# Patient Record
Sex: FEMALE | Race: WHITE | NOT HISPANIC OR LATINO | Employment: OTHER | URBAN - NONMETROPOLITAN AREA
[De-identification: names, ages, dates, MRNs, and addresses within clinical notes are randomized per-mention and may not be internally consistent; named-entity substitution may affect disease eponyms.]

---

## 2017-02-23 ENCOUNTER — OFFICE VISIT (OUTPATIENT)
Dept: CARDIOLOGY | Facility: CLINIC | Age: 62
End: 2017-02-23

## 2017-02-23 VITALS
SYSTOLIC BLOOD PRESSURE: 142 MMHG | HEIGHT: 64 IN | OXYGEN SATURATION: 99 % | DIASTOLIC BLOOD PRESSURE: 80 MMHG | HEART RATE: 74 BPM | WEIGHT: 201 LBS | BODY MASS INDEX: 34.31 KG/M2 | RESPIRATION RATE: 16 BRPM

## 2017-02-23 DIAGNOSIS — I10 ESSENTIAL HYPERTENSION: Primary | ICD-10-CM

## 2017-02-23 DIAGNOSIS — E78.2 MIXED HYPERLIPIDEMIA: ICD-10-CM

## 2017-02-23 PROCEDURE — 99213 OFFICE O/P EST LOW 20 MIN: CPT | Performed by: INTERNAL MEDICINE

## 2017-02-23 RX ORDER — VALSARTAN 160 MG/1
TABLET ORAL DAILY
Refills: 1 | COMMUNITY
Start: 2016-12-31 | End: 2018-03-19 | Stop reason: ALTCHOICE

## 2017-02-23 RX ORDER — VENLAFAXINE HYDROCHLORIDE 150 MG/1
150 CAPSULE, EXTENDED RELEASE ORAL DAILY
Refills: 0 | COMMUNITY
Start: 2017-01-27 | End: 2020-02-19

## 2017-02-23 RX ORDER — SIMVASTATIN 40 MG
TABLET ORAL DAILY
Refills: 1 | Status: ON HOLD | COMMUNITY
Start: 2017-01-27 | End: 2019-09-26 | Stop reason: ALTCHOICE

## 2017-02-23 RX ORDER — VALSARTAN AND HYDROCHLOROTHIAZIDE 80; 12.5 MG/1; MG/1
1 TABLET, FILM COATED ORAL DAILY
Qty: 90 TABLET | Refills: 3 | Status: SHIPPED | OUTPATIENT
Start: 2017-02-23 | End: 2018-03-19 | Stop reason: SDUPTHER

## 2017-02-23 RX ORDER — FLUOXETINE HYDROCHLORIDE 40 MG/1
CAPSULE ORAL EVERY MORNING
Refills: 0 | COMMUNITY
Start: 2017-01-27 | End: 2020-02-19

## 2017-02-23 NOTE — PROGRESS NOTES
"[facility] at [facility]  Office Progress Note  Germaine Jett  1955  [phone]      Visit Date: 02/23/2017    PCP: Antonio Mike MD  [address]  [facility] [address]    IDENTIFICATION: A 61 y.o. female from [facility].      Chief Complaint   Patient presents with   • Follow-up     1 YEAR   • Hypertension   • Hyperlipidemia   • Palpitations       PROBLEM LIST:   CAD?  No recent ischemic evaluation  HL  simva 3/13 with -125  04/15 248/184/59/152, on statin \"few days a week\"  HTN  Insomnia  hypothyroid  10/14 TSH 5.320  04/15 TSH 4.580  (R) knee osteoarthritis  meniscal scope, Dr. Alvino Cifuentes, 01/15  removal of ganglion cyst 01/15     Allergies  Allergies   Allergen Reactions   • Benadryl [Diphenhydramine]      SLEEPY       Current Medications    Current Outpatient Prescriptions:   •  FLUoxetine (PROzac) 40 MG capsule, Every Morning., Disp: , Rfl: 0  •  simvastatin (ZOCOR) 40 MG tablet, Daily., Disp: , Rfl: 1  •  valsartan (DIOVAN) 160 MG tablet, Daily., Disp: , Rfl: 1  •  venlafaxine XR (EFFEXOR-XR) 150 MG 24 hr capsule, Daily., Disp: , Rfl: 0      History of Present Illness     Pt returns after a two-year hiatus from practice noticing labile blood pressures headaches and then fatigue on occasion.  She has noted about 2 times per month  she will have headache and then overwhelming fatigue since she feels like a sloth at these times.  There are no correlating symptoms that she can state that  happen with this and nothing to insite such symptoms.  Her baseline heart rate with systolics of 130s to 150s.  She has started a low carbohydrate diet in the last 2 weeks and is pleased that she has lost 6 pounds and feels better.  She had lab work in December of which she was told was okay however I'm data deficient regarding such     ROS:  All systems have been reviewed and are negative with the exception of those mentioned in the HPI.    OBJECTIVE:  Vitals:    02/23/17 1116 02/23/17 1117 " "  BP: 156/80 142/80   BP Location: Right arm Right arm   Patient Position: Sitting Standing   Cuff Size: Large Adult Large Adult   Pulse: 74    Resp: 16    SpO2: 99%    Weight: 201 lb (91.2 kg)    Height: 64\" (162.6 cm)      Physical Exam   Constitutional: She appears well-developed and well-nourished.   Neck: Normal range of motion. Neck supple. No hepatojugular reflux and no JVD present. Carotid bruit is not present. No tracheal deviation present. No thyromegaly present.   Cardiovascular: Normal rate, regular rhythm, S1 normal, S2 normal, intact distal pulses and normal pulses.  PMI is not displaced.  Exam reveals no gallop, no distant heart sounds, no friction rub, no midsystolic click and no opening snap.    No murmur heard.  Pulses:       Radial pulses are 2+ on the right side, and 2+ on the left side.        Dorsalis pedis pulses are 2+ on the right side, and 2+ on the left side.        Posterior tibial pulses are 2+ on the right side, and 2+ on the left side.   Pulmonary/Chest: Effort normal and breath sounds normal. She has no wheezes. She has no rales.   Abdominal: Soft. Bowel sounds are normal. She exhibits no mass. There is no tenderness. There is no guarding.       Diagnostic Data:  Procedures      ASSESSMENT:   Diagnosis Plan   1. Essential hypertension     2. Mixed hyperlipidemia         PLAN:  Uncontrolled hypertension will add low-dose diuretic to her current Diovan formulation    Dyslipidemia on statin therapy    Headache with questionable atypical migraine    Antonio Mike MD, thank you for referring Ms. Jett for evaluation.  I have forwarded my electronically generated recommendations to you for review.  Please do not hesitate to call with any questions.      Gurvinder Shipman MD, FACC  "

## 2017-11-10 ENCOUNTER — TRANSCRIBE ORDERS (OUTPATIENT)
Dept: ADMINISTRATIVE | Facility: HOSPITAL | Age: 62
End: 2017-11-10

## 2017-11-10 DIAGNOSIS — Z12.39 SCREENING FOR BREAST CANCER: Primary | ICD-10-CM

## 2018-01-15 ENCOUNTER — OFFICE VISIT (OUTPATIENT)
Dept: ORTHOPEDIC SURGERY | Facility: CLINIC | Age: 63
End: 2018-01-15

## 2018-01-15 VITALS
BODY MASS INDEX: 34.64 KG/M2 | WEIGHT: 207.89 LBS | SYSTOLIC BLOOD PRESSURE: 165 MMHG | DIASTOLIC BLOOD PRESSURE: 82 MMHG | HEART RATE: 91 BPM | HEIGHT: 65 IN

## 2018-01-15 DIAGNOSIS — M17.11 PRIMARY OSTEOARTHRITIS OF RIGHT KNEE: Primary | ICD-10-CM

## 2018-01-15 PROCEDURE — 99214 OFFICE O/P EST MOD 30 MIN: CPT | Performed by: ORTHOPAEDIC SURGERY

## 2018-01-15 PROCEDURE — 20610 DRAIN/INJ JOINT/BURSA W/O US: CPT | Performed by: ORTHOPAEDIC SURGERY

## 2018-01-15 RX ORDER — LIDOCAINE HYDROCHLORIDE 10 MG/ML
4 INJECTION, SOLUTION INFILTRATION; PERINEURAL
Status: COMPLETED | OUTPATIENT
Start: 2018-01-15 | End: 2018-01-15

## 2018-01-15 RX ORDER — TRIAMCINOLONE ACETONIDE 40 MG/ML
40 INJECTION, SUSPENSION INTRA-ARTICULAR; INTRAMUSCULAR
Status: COMPLETED | OUTPATIENT
Start: 2018-01-15 | End: 2018-01-15

## 2018-01-15 RX ORDER — ROPINIROLE 2 MG/1
TABLET, FILM COATED ORAL
Refills: 0 | Status: ON HOLD | COMMUNITY
Start: 2018-01-02 | End: 2019-09-26

## 2018-01-15 RX ORDER — DEXTROMETHORPHAN HYDROBROMIDE, DOXYLAMINE SUCCINATE 15; 135 MG/15ML; MG/5ML
LIQUID ORAL
Refills: 0 | Status: ON HOLD | COMMUNITY
Start: 2018-01-02 | End: 2019-09-26

## 2018-01-15 RX ADMIN — LIDOCAINE HYDROCHLORIDE 4 ML: 10 INJECTION, SOLUTION INFILTRATION; PERINEURAL at 11:29

## 2018-01-15 RX ADMIN — TRIAMCINOLONE ACETONIDE 40 MG: 40 INJECTION, SUSPENSION INTRA-ARTICULAR; INTRAMUSCULAR at 11:29

## 2018-01-15 NOTE — PROGRESS NOTES
Procedure   Large Joint Arthrocentesis  Date/Time: 1/15/2018 11:29 AM  Consent given by: patient  Site marked: site marked  Timeout: Immediately prior to procedure a time out was called to verify the correct patient, procedure, equipment, support staff and site/side marked as required   Supporting Documentation  Indications: pain   Procedure Details  Location: knee - R knee  Preparation: Patient was prepped and draped in the usual sterile fashion  Needle size: 22 G  Approach: anterolateral  Medications administered: 4 mL lidocaine 1 %; 40 mg triamcinolone acetonide 40 MG/ML  Patient tolerance: patient tolerated the procedure well with no immediate complications

## 2018-01-15 NOTE — PROGRESS NOTES
[facility] Note    Subjective     Chief Complaint   Patient presents with   • Right Knee - Follow-up        HPI    Germaine Jett is a 62 y.o. female.   She presents today for evaluation of right knee pain.  She has a known history of arthritis, and the pain is moderate to severe, aching and stabbing, associated with swelling and stiffness.  It worsens with climbing stairs.  Previous treatment has included steroid injections, as well as Supartz injections, which have provided good relief in the past.      Patient Active Problem List   Diagnosis   • Palpitations   • Hypertension   • Hyperlipidemia   • Obesity (BMI 30.0-34.9)   • Hypercholesterolemia   • Depression   • Knee osteoarthritis   • Hypothyroid   • Insomnia     Past Medical History:   Diagnosis Date   • Acute pain of left knee    • Depression     Hx of   • Dyspnea    • Edema    • Heart disease    • Hypercholesterolemia    • Hyperlipidemia    • Hypertension    • Hypothyroid      10/14 TSH 5.320;  04/15 TSH 4.580   • Insomnia    • Knee osteoarthritis     RT. meniscal scope, Dr. Alvino Cifuentes, 01/15. removal of ganglion cyst 01/15   • Obesity (BMI 30.0-34.9)     BMI 33.66   • Palpitations       Past Surgical History:   Procedure Laterality Date   • APPENDECTOMY     • BLADDER SURGERY     • GANGLION CYST EXCISION  2015   • HYSTERECTOMY     • INCISIONAL BREAST BIOPSY     • KNEE ARTHROSCOPY Right    • TONSILLECTOMY        Family History   Problem Relation Age of Onset   • Heart attack Mother      Acute MI   • Hypertension Mother    • Stroke Mother    • Depression Mother    • Heart disease Mother    • Osteoarthritis Mother    • Heart attack Father 63        • Heart disease Father    • Hypertension Father    • Depression Other    • Heart disease Other    • Hypertension Other      Social History     Social History   • Marital status:      Spouse name: N/A   • Number of children: N/A   • Years of education: N/A     Occupational  History   • Not on file.     Social History Main Topics   • Smoking status: Never Smoker   • Smokeless tobacco: Not on file   • Alcohol use No   • Drug use: No   • Sexual activity: Not on file     Other Topics Concern   • Not on file     Social History Narrative      Current Outpatient Prescriptions on File Prior to Visit   Medication Sig Dispense Refill   • Acetaminophen (TYLENOL ARTHRITIS PAIN PO) Take  by mouth Take As Directed.     • Estradiol Acetate (FEMRING) 0.05 MG/24HR ring Insert  into the vagina Take As Directed.     • FLUoxetine (PROzac) 40 MG capsule Every Morning.  0   • levothyroxine (SYNTHROID, LEVOTHROID) 25 MCG tablet Take  by mouth Daily.     • simvastatin (ZOCOR) 40 MG tablet Daily.  1   • temazepam (RESTORIL) 15 MG capsule Take  by mouth Take As Directed.     • valsartan (DIOVAN) 160 MG tablet Daily.  1   • valsartan-hydrochlorothiazide (DIOVAN HCT) 80-12.5 MG per tablet Take 1 tablet by mouth Daily. 90 tablet 3   • venlafaxine XR (EFFEXOR-XR) 150 MG 24 hr capsule Daily.  0     No current facility-administered medications on file prior to visit.       Allergies   Allergen Reactions   • Benadryl [Diphenhydramine]      SLEEPY        Review of Systems   Constitutional: Positive for fatigue. Negative for activity change, appetite change, chills, diaphoresis, fever and unexpected weight change.   HENT: Negative for congestion, dental problem, drooling, ear discharge, ear pain, facial swelling, hearing loss, mouth sores, nosebleeds, postnasal drip, rhinorrhea, sinus pressure, sneezing, sore throat, tinnitus, trouble swallowing and voice change.    Eyes: Negative for photophobia, pain, discharge, redness, itching and visual disturbance.   Respiratory: Negative for apnea, cough, choking, chest tightness, shortness of breath, wheezing and stridor.    Cardiovascular: Negative for chest pain, palpitations and leg swelling.   Gastrointestinal: Negative for abdominal distention, abdominal pain, anal  "bleeding, blood in stool, constipation, diarrhea, nausea, rectal pain and vomiting.   Endocrine: Negative for cold intolerance, heat intolerance, polydipsia, polyphagia and polyuria.   Genitourinary: Negative for decreased urine volume, difficulty urinating, dysuria, enuresis, flank pain, frequency, genital sores, hematuria and urgency.   Musculoskeletal: Positive for joint swelling. Negative for arthralgias, back pain, gait problem, myalgias, neck pain and neck stiffness.        Joint Pain    Skin: Negative for color change, pallor, rash and wound.   Allergic/Immunologic: Negative for environmental allergies, food allergies and immunocompromised state.   Neurological: Negative for dizziness, tremors, seizures, syncope, facial asymmetry, speech difficulty, weakness, light-headedness, numbness and headaches.   Hematological: Negative for adenopathy. Does not bruise/bleed easily.   Psychiatric/Behavioral: Negative for agitation, behavioral problems, confusion, decreased concentration, dysphoric mood, hallucinations, self-injury, sleep disturbance and suicidal ideas. The patient is not nervous/anxious and is not hyperactive.         Objective      Physical Exam  /82  Pulse 91  Ht 165 cm (64.96\")  Wt 94.3 kg (207 lb 14.3 oz)  BMI 34.64 kg/m2    Body mass index is 34.64 kg/(m^2).    General:   Mental Status:  Alert   Appearance: Cooperative, in no acute distress   Build and Nutrition: Well-nourished and well developed female   Orientation: Alert and oriented to person, place and time   Posture: Normal   Gait: Normal    Integument:   Right knee: No skin lesions, no rash, no ecchymosis    Lower Extremities:   Right Knee:    Tenderness:  Medial joint line tenderness    Effusion:  None    Swelling: None    Crepitus:  Positive    Range of motion:  Extension: 0°       Flexion: 120°  Instability:  No varus laxity, no valgus laxity, negative anterior drawer  Deformities:  Mild varus      Imaging/Studies  Imaging " Results (last 24 hours)     Procedure Component Value Units Date/Time    XR Knee 4+ View Right [[id number]] Resulted:  01/15/18 1125     Updated:  01/15/18 1125    Narrative:       RIght Knee Radiographs  Indication: right knee pain  Views: Standing AP's and skiers of both knees, with lateral and sunrise   views of the right knee    Comparison: no prior studies available    Findings:   Arthritic changes are seen, most affecting the medial and patellofemoral   joint, with near bone-on-bone contact in medial compartment.            Assessment and Plan     Germaine was seen today for follow-up.    Diagnoses and all orders for this visit:    Primary osteoarthritis of right knee  -     XR Knee 4+ View Right  -     Large Joint Arthrocentesis  -     Diclofenac Sodium (PENNSAID) 2 % solution; Apply to affected areas twice a day as directed          I reviewed my findings with the patient today.  Her right knee is bothering her, and she would like to have an injection today.  Ultimately, she is a candidate for knee replacement surgery.  She would also like to try an anti-inflammatory gel, and a prescription was provided today.  I will see her back in 4 months, sooner for any problems.    Return in about 4 months (around 5/15/2018).      Medical Decision Making  Management Options : prescription/IM medicine  Data/Risk: radiology tests and independent visualization of imaging, lab tests, or EMG/NCV      Medardo Cosme MD  01/15/18  9:46 PM

## 2018-01-22 ENCOUNTER — APPOINTMENT (OUTPATIENT)
Dept: MAMMOGRAPHY | Facility: HOSPITAL | Age: 63
End: 2018-01-22

## 2018-03-05 ENCOUNTER — APPOINTMENT (OUTPATIENT)
Dept: MAMMOGRAPHY | Facility: HOSPITAL | Age: 63
End: 2018-03-05

## 2018-03-19 RX ORDER — VALSARTAN AND HYDROCHLOROTHIAZIDE 80; 12.5 MG/1; MG/1
1 TABLET, FILM COATED ORAL DAILY
Qty: 60 TABLET | Refills: 0 | Status: SHIPPED | OUTPATIENT
Start: 2018-03-19 | End: 2019-03-25 | Stop reason: ALTCHOICE

## 2018-04-23 ENCOUNTER — APPOINTMENT (OUTPATIENT)
Dept: MAMMOGRAPHY | Facility: HOSPITAL | Age: 63
End: 2018-04-23

## 2018-07-09 ENCOUNTER — APPOINTMENT (OUTPATIENT)
Dept: MAMMOGRAPHY | Facility: HOSPITAL | Age: 63
End: 2018-07-09

## 2018-07-16 ENCOUNTER — OFFICE VISIT (OUTPATIENT)
Dept: ORTHOPEDIC SURGERY | Facility: CLINIC | Age: 63
End: 2018-07-16

## 2018-07-16 VITALS — OXYGEN SATURATION: 94 % | BODY MASS INDEX: 33.94 KG/M2 | HEART RATE: 89 BPM | HEIGHT: 65 IN | WEIGHT: 203.71 LBS

## 2018-07-16 DIAGNOSIS — M17.11 PRIMARY OSTEOARTHRITIS OF RIGHT KNEE: Primary | ICD-10-CM

## 2018-07-16 PROCEDURE — 20610 DRAIN/INJ JOINT/BURSA W/O US: CPT | Performed by: ORTHOPAEDIC SURGERY

## 2018-07-16 RX ORDER — ROPIVACAINE HYDROCHLORIDE 5 MG/ML
4 INJECTION, SOLUTION EPIDURAL; INFILTRATION; PERINEURAL
Status: COMPLETED | OUTPATIENT
Start: 2018-07-16 | End: 2018-07-16

## 2018-07-16 RX ORDER — TRIAMCINOLONE ACETONIDE 40 MG/ML
40 INJECTION, SUSPENSION INTRA-ARTICULAR; INTRAMUSCULAR
Status: COMPLETED | OUTPATIENT
Start: 2018-07-16 | End: 2018-07-16

## 2018-07-16 RX ADMIN — ROPIVACAINE HYDROCHLORIDE 4 ML: 5 INJECTION, SOLUTION EPIDURAL; INFILTRATION; PERINEURAL at 10:35

## 2018-07-16 RX ADMIN — TRIAMCINOLONE ACETONIDE 40 MG: 40 INJECTION, SUSPENSION INTRA-ARTICULAR; INTRAMUSCULAR at 10:35

## 2018-07-16 NOTE — PROGRESS NOTES
[facility] Orthopaedic Surgery Clinic Note    Subjective     Chief Complaint   Patient presents with   • Right Knee - Follow-up        HPI    Germaine Jett is a 63 y.o. female.  She follows up today for her right knee.  She continues to have moderate to severe pain in the knee, aching, burning, sharp pains, associated with swelling and stiffness.  It worsens with climbing stairs.  She would like to have another injection today.  She is not yet interested in surgical intervention.      Patient Active Problem List   Diagnosis   • Palpitations   • Hypertension   • Hyperlipidemia   • Obesity (BMI 30.0-34.9)   • Hypercholesterolemia   • Depression   • Knee osteoarthritis   • Hypothyroid   • Insomnia     Past Medical History:   Diagnosis Date   • Acute pain of left knee    • Depression     Hx of   • Dyspnea    • Edema    • Heart disease    • Hypercholesterolemia    • Hyperlipidemia    • Hypertension    • Hypothyroid      10/14 TSH 5.320;  04/15 TSH 4.580   • Insomnia    • Knee osteoarthritis     RT. meniscal scope, Dr. Alvino Cifuentes, 01/15. removal of ganglion cyst 01/15   • Obesity (BMI 30.0-34.9)     BMI 33.66   • Palpitations       Past Surgical History:   Procedure Laterality Date   • APPENDECTOMY     • BLADDER SURGERY     • GANGLION CYST EXCISION  2015   • HYSTERECTOMY     • INCISIONAL BREAST BIOPSY     • KNEE ARTHROSCOPY Right    • TONSILLECTOMY        Family History   Problem Relation Age of Onset   • Heart attack Mother         Acute MI   • Hypertension Mother    • Stroke Mother    • Depression Mother    • Heart disease Mother    • Osteoarthritis Mother    • Heart attack Father 63           • Heart disease Father    • Hypertension Father    • Depression Other    • Heart disease Other    • Hypertension Other      Social History     Social History   • Marital status:      Spouse name: N/A   • Number of children: N/A   • Years of education: N/A     Occupational History   • Not on file.     Social History  "Main Topics   • Smoking status: Never Smoker   • Smokeless tobacco: Not on file   • Alcohol use No   • Drug use: No   • Sexual activity: Not on file     Other Topics Concern   • Not on file     Social History Narrative   • No narrative on file      Current Outpatient Prescriptions on File Prior to Visit   Medication Sig Dispense Refill   • Acetaminophen (TYLENOL ARTHRITIS PAIN PO) Take  by mouth Take As Directed.     • Diclofenac Sodium (PENNSAID) 2 % solution Apply to affected areas twice a day as directed 112 g 1   • Estradiol Acetate (FEMRING) 0.05 MG/24HR ring Insert  into the vagina Take As Directed.     • FLUoxetine (PROzac) 40 MG capsule Every Morning.  0   • simvastatin (ZOCOR) 40 MG tablet Daily.  1   • venlafaxine XR (EFFEXOR-XR) 150 MG 24 hr capsule Daily.  0   • levothyroxine (SYNTHROID, LEVOTHROID) 25 MCG tablet Take  by mouth Daily.     • RA SUPHEDRINE 30 MG tablet TAKE 1-2 TABLETS BY MOUTH 3 TIMES DAILY AS NEEDED  0   • rOPINIRole (REQUIP) 2 MG tablet TAKE 1 TABLET BY MOUTH AT BEDTIME FOR LEGS  0   • temazepam (RESTORIL) 15 MG capsule Take  by mouth Take As Directed.     • valsartan-hydrochlorothiazide (DIOVAN HCT) 80-12.5 MG per tablet Take 1 tablet by mouth Daily. Needs an appointment before any more refills 60 tablet 0     No current facility-administered medications on file prior to visit.       Allergies   Allergen Reactions   • Benadryl [Diphenhydramine]      SLEEPY        Review of Systems   Constitutional: Positive for fatigue.   HENT: Negative.    Eyes: Negative.    Respiratory: Negative.    Cardiovascular: Negative.    Gastrointestinal: Negative.    Endocrine: Negative.    Genitourinary: Negative.    Musculoskeletal: Negative.    Skin: Negative.    Allergic/Immunologic: Negative.    Neurological: Negative.    Hematological: Negative.    Psychiatric/Behavioral: Positive for sleep disturbance.        Objective      Physical Exam  Pulse 89   Ht 165 cm (64.96\")   Wt 92.4 kg (203 lb 11.3 oz)   " SpO2 94%   BMI 33.94 kg/m²     Body mass index is 33.94 kg/m².    General:   Mental Status:  Alert   Appearance: Cooperative, in no acute distress   Build and Nutrition: Overweight female   Orientation: Alert and oriented to person, place and time   Posture: Normal   Gait: Normal    Integument:   Right knee: No skin lesions, no rash, no ecchymosis    Lower Extremities:   Right Knee:    Tenderness:  Medial joint line tenderness    Effusion:  None    Swelling: None    Crepitus:  Positive    Range of motion:  Extension: 0°       Flexion: 120°  Instability:  No varus laxity, no valgus laxity, negative anterior drawer  Deformities:  None          Assessment and Plan     Germaine was seen today for follow-up.    Diagnoses and all orders for this visit:    Primary osteoarthritis of right knee  -     Large Joint Arthrocentesis        I reviewed my findings with patient today.  She would like to have an injection for her knee today, and is not yet interested in surgical intervention.  I will see her back in 4 months, but sooner for any problems.  Injection was provided today.    Of note, she had 90% relief just a few minutes following the injection.    Return in about 4 months (around 11/16/2018).      Medical Decision Making  Management Options : prescription/IM medicine      Medardo Cosme MD  07/16/18  10:48 AM

## 2018-07-16 NOTE — PROGRESS NOTES
Procedure   Large Joint Arthrocentesis  Date/Time: 7/16/2018 10:35 AM  Consent given by: patient  Site marked: site marked  Timeout: Immediately prior to procedure a time out was called to verify the correct patient, procedure, equipment, support staff and site/side marked as required   Supporting Documentation  Indications: pain   Procedure Details  Location: knee - R knee  Preparation: Patient was prepped and draped in the usual sterile fashion  Needle size: 22 G  Approach: anterolateral  Medications administered: 4 mL ropivacaine 0.5 %; 40 mg triamcinolone acetonide 40 MG/ML  Patient tolerance: patient tolerated the procedure well with no immediate complications

## 2018-07-23 ENCOUNTER — HOSPITAL ENCOUNTER (OUTPATIENT)
Dept: MAMMOGRAPHY | Facility: HOSPITAL | Age: 63
Discharge: HOME | End: 2018-07-23
Admitting: OBSTETRICS & GYNECOLOGY

## 2018-07-23 DIAGNOSIS — Z12.39 SCREENING FOR BREAST CANCER: ICD-10-CM

## 2018-07-23 PROCEDURE — 77067 SCR MAMMO BI INCL CAD: CPT

## 2018-07-23 PROCEDURE — 77063 BREAST TOMOSYNTHESIS BI: CPT

## 2019-01-21 ENCOUNTER — OFFICE VISIT (OUTPATIENT)
Dept: ORTHOPEDIC SURGERY | Facility: CLINIC | Age: 64
End: 2019-01-21

## 2019-01-21 VITALS — BODY MASS INDEX: 33.09 KG/M2 | HEIGHT: 65 IN | HEART RATE: 90 BPM | WEIGHT: 198.63 LBS | OXYGEN SATURATION: 98 %

## 2019-01-21 DIAGNOSIS — M17.11 PRIMARY OSTEOARTHRITIS OF RIGHT KNEE: Primary | ICD-10-CM

## 2019-01-21 PROCEDURE — 20610 DRAIN/INJ JOINT/BURSA W/O US: CPT | Performed by: ORTHOPAEDIC SURGERY

## 2019-01-21 RX ORDER — TRIAMCINOLONE ACETONIDE 40 MG/ML
40 INJECTION, SUSPENSION INTRA-ARTICULAR; INTRAMUSCULAR
Status: COMPLETED | OUTPATIENT
Start: 2019-01-21 | End: 2019-01-21

## 2019-01-21 RX ORDER — ROPIVACAINE HYDROCHLORIDE 5 MG/ML
4 INJECTION, SOLUTION EPIDURAL; INFILTRATION; PERINEURAL
Status: COMPLETED | OUTPATIENT
Start: 2019-01-21 | End: 2019-01-21

## 2019-01-21 RX ADMIN — TRIAMCINOLONE ACETONIDE 40 MG: 40 INJECTION, SUSPENSION INTRA-ARTICULAR; INTRAMUSCULAR at 08:51

## 2019-01-21 RX ADMIN — ROPIVACAINE HYDROCHLORIDE 4 ML: 5 INJECTION, SOLUTION EPIDURAL; INFILTRATION; PERINEURAL at 08:51

## 2019-01-21 NOTE — PROGRESS NOTES
Procedure   Large Joint Arthrocentesis: R knee  Date/Time: 1/21/2019 8:51 AM  Consent given by: patient  Site marked: site marked  Timeout: Immediately prior to procedure a time out was called to verify the correct patient, procedure, equipment, support staff and site/side marked as required   Supporting Documentation  Indications: pain   Procedure Details  Location: knee - R knee  Preparation: Patient was prepped and draped in the usual sterile fashion  Needle size: 22 G  Approach: anterolateral  Medications administered: 40 mg triamcinolone acetonide 40 MG/ML; 4 mL ropivacaine 0.5 %  Patient tolerance: patient tolerated the procedure well with no immediate complications

## 2019-01-21 NOTE — PROGRESS NOTES
[facility] [place] [facility] Note    Subjective     Chief Complaint   Patient presents with   • Right Knee - Follow-up     6 months        HPI    Germaine Jett is a 63 y.o. female.  She follows up today for her right knee.  She continues to have pain in the right knee, 6 out of 10 currently, aching and stabbing, but does improve with injections, and she would like to have another injection today.  She is not interested in surgical management at this time.  She is working on weight loss.      Patient Active Problem List   Diagnosis   • Palpitations   • Hypertension   • Hyperlipidemia   • Obesity (BMI 30.0-34.9)   • Hypercholesterolemia   • Depression   • Knee osteoarthritis   • Hypothyroid   • Insomnia     Past Medical History:   Diagnosis Date   • Acute pain of left knee    • Depression     Hx of   • Dyspnea    • Edema    • Heart disease    • Hypercholesterolemia    • Hyperlipidemia    • Hypertension    • Hypothyroid      10/14 TSH 5.320;  04/15 TSH 4.580   • Insomnia    • Knee osteoarthritis     RT. meniscal scope, Dr. Alvino Cifuentes, 01/15. removal of ganglion cyst 01/15   • Obesity (BMI 30.0-34.9)     BMI 33.66   • Palpitations       Past Surgical History:   Procedure Laterality Date   • APPENDECTOMY     • BLADDER SURGERY     • BREAST BIOPSY     • GANGLION CYST EXCISION  2015   • HYSTERECTOMY     • INCISIONAL BREAST BIOPSY     • KNEE ARTHROSCOPY Right    • TONSILLECTOMY        Family History   Problem Relation Age of Onset   • Heart attack Mother         Acute MI   • Hypertension Mother    • Stroke Mother    • Depression Mother    • Heart disease Mother    • Osteoarthritis Mother    • Heart attack Father 63           • Heart disease Father    • Hypertension Father    • Depression Other    • Heart disease Other    • Hypertension Other      Social History     Socioeconomic History   • Marital status:      Spouse name: Not on file   • Number of children: Not on file   • Years of education: Not on  file   • Highest education level: Not on file   Social Needs   • Financial resource strain: Not on file   • Food insecurity - worry: Not on file   • Food insecurity - inability: Not on file   • Transportation needs - medical: Not on file   • Transportation needs - non-medical: Not on file   Occupational History   • Not on file   Tobacco Use   • Smoking status: Never Smoker   Substance and Sexual Activity   • Alcohol use: No   • Drug use: No   • Sexual activity: Not on file   Other Topics Concern   • Not on file   Social History Narrative   • Not on file      Current Outpatient Medications on File Prior to Visit   Medication Sig Dispense Refill   • Acetaminophen (TYLENOL ARTHRITIS PAIN PO) Take  by mouth Take As Directed.     • Diclofenac Sodium (PENNSAID) 2 % solution Apply to affected areas twice a day as directed 112 g 1   • Estradiol Acetate (FEMRING) 0.05 MG/24HR ring Insert  into the vagina Take As Directed.     • FLUoxetine (PROzac) 40 MG capsule Every Morning.  0   • levothyroxine (SYNTHROID, LEVOTHROID) 25 MCG tablet Take  by mouth Daily.     • RA SUPHEDRINE 30 MG tablet TAKE 1-2 TABLETS BY MOUTH 3 TIMES DAILY AS NEEDED  0   • rOPINIRole (REQUIP) 2 MG tablet TAKE 1 TABLET BY MOUTH AT BEDTIME FOR LEGS  0   • simvastatin (ZOCOR) 40 MG tablet Daily.  1   • temazepam (RESTORIL) 15 MG capsule Take  by mouth Take As Directed.     • valsartan-hydrochlorothiazide (DIOVAN HCT) 80-12.5 MG per tablet Take 1 tablet by mouth Daily. Needs an appointment before any more refills 60 tablet 0   • venlafaxine XR (EFFEXOR-XR) 150 MG 24 hr capsule Daily.  0     No current facility-administered medications on file prior to visit.       Allergies   Allergen Reactions   • Benadryl [Diphenhydramine]      SLEEPY        Review of Systems   Constitutional: Negative.  Negative for activity change, appetite change, chills, diaphoresis, fatigue, fever and unexpected weight change.   HENT: Negative.  Negative for congestion, dental  "problem, drooling, ear discharge, ear pain, facial swelling, hearing loss, mouth sores, nosebleeds, postnasal drip, rhinorrhea, sinus pressure, sneezing, sore throat, tinnitus, trouble swallowing and voice change.    Eyes: Negative.  Negative for photophobia, pain, discharge, redness, itching and visual disturbance.   Respiratory: Negative.  Negative for apnea, cough, choking, chest tightness, shortness of breath, wheezing and stridor.    Cardiovascular: Negative.  Negative for chest pain, palpitations and leg swelling.   Gastrointestinal: Negative.  Negative for abdominal distention, abdominal pain, anal bleeding, blood in stool, constipation, diarrhea, nausea, rectal pain and vomiting.   Endocrine: Negative.  Negative for cold intolerance, heat intolerance, polydipsia, polyphagia and polyuria.   Genitourinary: Negative.  Negative for decreased urine volume, difficulty urinating, dysuria, enuresis, flank pain, frequency, genital sores, hematuria and urgency.   Musculoskeletal: Positive for arthralgias. Negative for back pain, gait problem, joint swelling, myalgias, neck pain and neck stiffness.   Skin: Negative.  Negative for color change, pallor, rash and wound.   Allergic/Immunologic: Negative.  Negative for environmental allergies, food allergies and immunocompromised state.   Neurological: Negative.  Negative for dizziness, tremors, seizures, syncope, facial asymmetry, speech difficulty, weakness, light-headedness, numbness and headaches.   Hematological: Negative.  Negative for adenopathy. Does not bruise/bleed easily.   Psychiatric/Behavioral: Negative.  Negative for agitation, behavioral problems, confusion, decreased concentration, dysphoric mood, hallucinations, self-injury, sleep disturbance and suicidal ideas. The patient is not nervous/anxious and is not hyperactive.         Objective      Physical Exam  Pulse 90   Ht 165 cm (64.96\")   Wt 90.1 kg (198 lb 10.2 oz)   SpO2 98%   BMI 33.09 kg/m² "     Body mass index is 33.09 kg/m².    General:   Mental Status:  Alert   Appearance: Cooperative, in no acute distress   Build and Nutrition: Overweight female   Orientation: Alert and oriented to person, place and time   Posture: Normal   Gait: Normal    Integument:   Right knee: No skin lesions, no rash, no ecchymosis    Lower Extremities:   Right Knee:    Tenderness:  None    Effusion:  None    Swelling: None    Crepitus:  Positive    Range of motion:  Extension: 0°       Flexion: 120°  Instability:  No varus laxity, no valgus laxity, negative anterior drawer  Deformities:  None          Assessment and Plan     Germaine was seen today for follow-up.    Diagnoses and all orders for this visit:    Primary osteoarthritis of right knee  -     Large Joint Arthrocentesis: R knee        I reviewed my findings with patient today.  Right knee continues to bother her, and she like to have another injection today.  This was provided.  I will see her back in 4 months, but sooner for any problems.    Of note, she had 90% relief just a few minutes following the injection.    Return in about 4 months (around 5/21/2019).      Medical Decision Making  Management Options : prescription/IM medicine      Medardo Cosme MD  01/21/19  9:06 AM

## 2019-03-19 ENCOUNTER — TRANSCRIBE ORDERS (OUTPATIENT)
Dept: MAMMOGRAPHY | Facility: HOSPITAL | Age: 64
End: 2019-03-19

## 2019-03-19 DIAGNOSIS — Z12.39 BREAST CANCER SCREENING: Primary | ICD-10-CM

## 2019-03-25 ENCOUNTER — OFFICE VISIT (OUTPATIENT)
Dept: ORTHOPEDIC SURGERY | Facility: CLINIC | Age: 64
End: 2019-03-25

## 2019-03-25 VITALS — BODY MASS INDEX: 33.43 KG/M2 | HEART RATE: 91 BPM | WEIGHT: 200.62 LBS | HEIGHT: 65 IN | OXYGEN SATURATION: 96 %

## 2019-03-25 DIAGNOSIS — M25.532 LEFT WRIST PAIN: Primary | ICD-10-CM

## 2019-03-25 PROCEDURE — 99214 OFFICE O/P EST MOD 30 MIN: CPT | Performed by: ORTHOPAEDIC SURGERY

## 2019-03-25 RX ORDER — TRAZODONE HYDROCHLORIDE 50 MG/1
TABLET ORAL
COMMUNITY
Start: 2019-03-25 | End: 2020-02-19

## 2019-03-25 RX ORDER — LOSARTAN POTASSIUM 50 MG/1
50 TABLET ORAL DAILY
Status: ON HOLD | COMMUNITY
End: 2019-09-26 | Stop reason: ALTCHOICE

## 2019-03-25 NOTE — PROGRESS NOTES
[facility] [place] [facility] Note    Subjective     Chief Complaint   Patient presents with   • Left Wrist - Pain        HPI    Germaine Jett is a 63 y.o. female.  She presents today for evaluation of left wrist pain.  She injured her left wrist on 3/22/2019, when she fell when getting out of bed.  She had the onset of pain and swelling in her wrist, with the onset of bruising.  She is right-hand dominant.  The pain is currently 8 out of 10, stabbing in quality, associated with swelling and stiffness.  No previous history of trauma.      Patient Active Problem List   Diagnosis   • Palpitations   • Hypertension   • Hyperlipidemia   • Obesity (BMI 30.0-34.9)   • Hypercholesterolemia   • Depression   • Knee osteoarthritis   • Hypothyroid   • Insomnia     Past Medical History:   Diagnosis Date   • Acute pain of left knee    • Depression     Hx of   • Dyspnea    • Edema    • Heart disease    • Hypercholesterolemia    • Hyperlipidemia    • Hypertension    • Hypothyroid      10/14 TSH 5.320;  04/15 TSH 4.580   • Insomnia    • Knee osteoarthritis     RT. meniscal scope, Dr. Alvino Cifuentes, 01/15. removal of ganglion cyst 01/15   • Obesity (BMI 30.0-34.9)     BMI 33.66   • Palpitations       Past Surgical History:   Procedure Laterality Date   • APPENDECTOMY     • BLADDER SURGERY     • BREAST BIOPSY     • GANGLION CYST EXCISION  2015   • HYSTERECTOMY     • INCISIONAL BREAST BIOPSY     • KNEE ARTHROSCOPY Right    • TONSILLECTOMY        Family History   Problem Relation Age of Onset   • Heart attack Mother         Acute MI   • Hypertension Mother    • Stroke Mother    • Depression Mother    • Heart disease Mother    • Osteoarthritis Mother    • Heart attack Father 63           • Heart disease Father    • Hypertension Father    • Depression Other    • Heart disease Other    • Hypertension Other      Social History     Socioeconomic History   • Marital status:      Spouse name: Not on file   • Number of  children: Not on file   • Years of education: Not on file   • Highest education level: Not on file   Tobacco Use   • Smoking status: Never Smoker   Substance and Sexual Activity   • Alcohol use: No   • Drug use: No      Current Outpatient Medications on File Prior to Visit   Medication Sig Dispense Refill   • Acetaminophen (TYLENOL ARTHRITIS PAIN PO) Take  by mouth Take As Directed.     • Diclofenac Sodium (PENNSAID) 2 % solution Apply to affected areas twice a day as directed 112 g 1   • Estradiol Acetate (FEMRING) 0.05 MG/24HR ring Insert  into the vagina Take As Directed.     • FLUoxetine (PROzac) 40 MG capsule Every Morning.  0   • levothyroxine (SYNTHROID, LEVOTHROID) 25 MCG tablet Take  by mouth Daily.     • losartan (COZAAR) 50 MG tablet Take 50 mg by mouth Daily.     • RA SUPHEDRINE 30 MG tablet TAKE 1-2 TABLETS BY MOUTH 3 TIMES DAILY AS NEEDED  0   • rOPINIRole (REQUIP) 2 MG tablet TAKE 1 TABLET BY MOUTH AT BEDTIME FOR LEGS  0   • simvastatin (ZOCOR) 40 MG tablet Daily.  1   • temazepam (RESTORIL) 15 MG capsule Take  by mouth Take As Directed.     • traZODone (DESYREL) 50 MG tablet      • venlafaxine XR (EFFEXOR-XR) 150 MG 24 hr capsule Daily.  0   • [DISCONTINUED] valsartan-hydrochlorothiazide (DIOVAN HCT) 80-12.5 MG per tablet Take 1 tablet by mouth Daily. Needs an appointment before any more refills 60 tablet 0     No current facility-administered medications on file prior to visit.       Allergies   Allergen Reactions   • Benadryl [Diphenhydramine]      SLEEPY        Review of Systems   Constitutional: Negative for activity change, appetite change, chills, diaphoresis, fatigue, fever and unexpected weight change.   HENT: Negative for congestion, dental problem, drooling, ear discharge, ear pain, facial swelling, hearing loss, mouth sores, nosebleeds, postnasal drip, rhinorrhea, sinus pressure, sneezing, sore throat, tinnitus, trouble swallowing and voice change.    Eyes: Negative for photophobia, pain,  "discharge, redness, itching and visual disturbance.   Respiratory: Negative for apnea, cough, choking, chest tightness, shortness of breath, wheezing and stridor.    Cardiovascular: Negative for chest pain, palpitations and leg swelling.   Gastrointestinal: Negative for abdominal distention, abdominal pain, anal bleeding, blood in stool, constipation, diarrhea, nausea, rectal pain and vomiting.   Endocrine: Negative for cold intolerance, heat intolerance, polydipsia, polyphagia and polyuria.   Genitourinary: Negative for decreased urine volume, difficulty urinating, dysuria, enuresis, flank pain, frequency, genital sores, hematuria and urgency.   Musculoskeletal: Positive for joint swelling. Negative for arthralgias, back pain, gait problem, myalgias, neck pain and neck stiffness.   Skin: Negative for color change, pallor, rash and wound.   Allergic/Immunologic: Negative for environmental allergies, food allergies and immunocompromised state.   Neurological: Negative for dizziness, tremors, seizures, syncope, facial asymmetry, speech difficulty, weakness, light-headedness, numbness and headaches.   Hematological: Negative for adenopathy. Does not bruise/bleed easily.   Psychiatric/Behavioral: Negative for agitation, behavioral problems, confusion, decreased concentration, dysphoric mood, hallucinations, self-injury, sleep disturbance and suicidal ideas. The patient is not nervous/anxious and is not hyperactive.         Objective      Physical Exam  Pulse 91   Ht 165 cm (64.96\")   Wt 91 kg (200 lb 9.9 oz)   SpO2 96%   Breastfeeding? No   BMI 33.42 kg/m²     Body mass index is 33.42 kg/m².    General:   Mental Status:  Alert   Appearance: Cooperative, in no acute distress   Build and Nutrition: Well-nourished well-developed female   Orientation: Alert and oriented to person, place and time   Posture: Normal   Gait: Normal    Integument:   Left wrist: Ecchymosis over the volar aspect of the " wrist    Neurologic:   Sensation:    Left hand: Intact to light touch in the digits   Motor:  Left upper extremity: 5/5 wrist flexors, wrist extensors, and interossei  Vascular:   Left upper extremity: 2+ radial pulse, prompt capillary refill    Upper Extremities:   Left Wrist:    Tenderness:  Over the distal radius    Effusion: None    Swelling:  Positive    Crepitus:  None    Atrophy:  None    Range of motion:  Full pronation and supination, with limited flexion extension secondary to pain  Instability:  None  Deformities:  None      Imaging/Studies  Imaging Results (last 24 hours)     Procedure Component Value Units Date/Time    XR Wrist 3+ View Left [[id number]] Resulted:  03/25/19 1828     Updated:  03/25/19 1830    Narrative:       Left Wrist Radiographs  Indication: left wrist pain  Views: AP, lateral, and oblique views of the left wrist    Comparison: no prior studies available for review    Findings:  No obvious acute bony abnormalities are appreciated, with good alignment,   and carpometacarpal joint degenerative changes.            Assessment and Plan     Germaine was seen today for pain.    Diagnoses and all orders for this visit:    Left wrist pain  -     XR Wrist 3+ View Left        1. Left wrist pain        I reviewed my findings with the patient today.  She does have tenderness over the distal radius, and I am suspicious of a nondisplaced fracture.  She is placed into a brace today, and I will see her back in 2-3 weeks with a repeat x-ray.  I will see her back sooner for any problems.  Advanced imaging may be considered if she has no improvement or worsening of symptoms.  In the meantime, ice, elevation, and anti-inflammatories as needed.    Return in about 2 weeks (around 4/8/2019) for Recheck with X-Rays.      Medical Decision Making  Data/Risk: radiology tests and independent visualization of imaging, lab tests, or EMG/NCV      Medardo Cosme MD  03/25/19  7:24 PM

## 2019-05-15 ENCOUNTER — OFFICE VISIT (OUTPATIENT)
Dept: ORTHOPEDIC SURGERY | Facility: CLINIC | Age: 64
End: 2019-05-15

## 2019-05-15 VITALS — HEIGHT: 65 IN | WEIGHT: 206.13 LBS | HEART RATE: 105 BPM | BODY MASS INDEX: 34.34 KG/M2 | OXYGEN SATURATION: 98 %

## 2019-05-15 DIAGNOSIS — M25.532 LEFT WRIST PAIN: ICD-10-CM

## 2019-05-15 DIAGNOSIS — M17.11 PRIMARY OSTEOARTHRITIS OF RIGHT KNEE: Primary | ICD-10-CM

## 2019-05-15 PROCEDURE — 99214 OFFICE O/P EST MOD 30 MIN: CPT | Performed by: ORTHOPAEDIC SURGERY

## 2019-05-15 RX ORDER — ROSUVASTATIN CALCIUM 10 MG/1
10 TABLET, COATED ORAL NIGHTLY
Refills: 0 | COMMUNITY
Start: 2019-04-30

## 2019-05-15 RX ORDER — MELOXICAM 7.5 MG/1
15 TABLET ORAL ONCE
Status: CANCELLED | OUTPATIENT
Start: 2019-05-15 | End: 2019-05-15

## 2019-05-15 RX ORDER — ACETAMINOPHEN 325 MG/1
1000 TABLET ORAL ONCE
Status: CANCELLED | OUTPATIENT
Start: 2019-05-15 | End: 2019-05-15

## 2019-05-15 RX ORDER — BUPROPION HYDROCHLORIDE 150 MG/1
150 TABLET ORAL DAILY
Refills: 0 | COMMUNITY
Start: 2019-03-25 | End: 2020-02-19 | Stop reason: SDUPTHER

## 2019-05-15 RX ORDER — PREGABALIN 150 MG/1
150 CAPSULE ORAL ONCE
Status: CANCELLED | OUTPATIENT
Start: 2019-05-15 | End: 2019-05-15

## 2019-05-15 NOTE — PROGRESS NOTES
[facility] [place] [facility] [place]    Subjective     Chief Complaint   Patient presents with   • Left Wrist - Follow-up     7 week   • Right Knee - Follow-up     4 month        HPI    Germaine Jett is a 64 y.o. female.  She presents today for her right knee, which is been an ongoing problem for about 5 years, following a particular injury.  The pain has been worsening in the knee, is currently 9 out of 10.  Pain is primarily along the medial aspect of the knee, but can be global.  The pain is worse with standing and climbing stairs.  She does have pain at night.  She complains of swelling and giving way.  She recently had a fall because of the giving way, and fell down several steps.  She has reached the point where she would like to proceed with right total knee replacement surgery.  She has exhausted conservative treatment options, with multiple injections in the past.  No history of clots or clotting disorders.    Regarding her left wrist, that pain has resolved.  No complaints in the wrist today.      Patient Active Problem List   Diagnosis   • Palpitations   • Hypertension   • Hyperlipidemia   • Obesity (BMI 30.0-34.9)   • Hypercholesterolemia   • Depression   • Knee osteoarthritis   • Hypothyroid   • Insomnia     Past Medical History:   Diagnosis Date   • Acute pain of left knee    • Depression     Hx of   • Dyspnea    • Edema    • Heart disease    • Hypercholesterolemia    • Hyperlipidemia    • Hypertension    • Hypothyroid      10/14 TSH 5.320;  04/15 TSH 4.580   • Insomnia    • Knee osteoarthritis     RT. meniscal scope, Dr. Alvino Cifuentes, 01/15. removal of ganglion cyst 01/15   • Obesity (BMI 30.0-34.9)     BMI 33.66   • Palpitations       Past Surgical History:   Procedure Laterality Date   • APPENDECTOMY     • BLADDER SURGERY     • BREAST BIOPSY     • GANGLION CYST EXCISION  01/2015   • HYSTERECTOMY     • INCISIONAL BREAST BIOPSY     • KNEE ARTHROSCOPY Right    • TONSILLECTOMY        Family History    Problem Relation Age of Onset   • Heart attack Mother         Acute MI   • Hypertension Mother    • Stroke Mother    • Depression Mother    • Heart disease Mother    • Osteoarthritis Mother    • Heart attack Father 63           • Heart disease Father    • Hypertension Father    • Depression Other    • Heart disease Other    • Hypertension Other      Social History     Socioeconomic History   • Marital status:      Spouse name: Not on file   • Number of children: Not on file   • Years of education: Not on file   • Highest education level: Not on file   Tobacco Use   • Smoking status: Never Smoker   • Smokeless tobacco: Never Used   Substance and Sexual Activity   • Alcohol use: No   • Drug use: No   • Sexual activity: Defer      Current Outpatient Medications on File Prior to Visit   Medication Sig Dispense Refill   • Acetaminophen (TYLENOL ARTHRITIS PAIN PO) Take  by mouth Take As Directed.     • buPROPion XL (WELLBUTRIN XL) 150 MG 24 hr tablet Take 150 mg by mouth Daily.  0   • Estradiol Acetate (FEMRING) 0.05 MG/24HR ring Insert  into the vagina Take As Directed.     • levothyroxine (SYNTHROID, LEVOTHROID) 25 MCG tablet Take  by mouth Daily.     • losartan (COZAAR) 50 MG tablet Take 50 mg by mouth Daily.     • RA SUPHEDRINE 30 MG tablet TAKE 1-2 TABLETS BY MOUTH 3 TIMES DAILY AS NEEDED  0   • rOPINIRole (REQUIP) 2 MG tablet TAKE 1 TABLET BY MOUTH AT BEDTIME FOR LEGS  0   • rosuvastatin (CRESTOR) 10 MG tablet   0   • temazepam (RESTORIL) 15 MG capsule Take  by mouth Take As Directed.     • traZODone (DESYREL) 50 MG tablet      • venlafaxine XR (EFFEXOR-XR) 150 MG 24 hr capsule Daily.  0   • [DISCONTINUED] Diclofenac Sodium (PENNSAID) 2 % solution Apply to affected areas twice a day as directed 112 g 1   • FLUoxetine (PROzac) 40 MG capsule Every Morning.  0   • simvastatin (ZOCOR) 40 MG tablet Daily.  1     No current facility-administered medications on file prior to visit.       Allergies   Allergen  Reactions   • Benadryl [Diphenhydramine]      SLEEPY        Review of Systems   Constitutional: Negative.  Negative for activity change, appetite change, chills, diaphoresis, fatigue, fever and unexpected weight change.   HENT: Negative.  Negative for congestion, dental problem, drooling, ear discharge, ear pain, facial swelling, hearing loss, mouth sores, nosebleeds, postnasal drip, rhinorrhea, sinus pressure, sneezing, sore throat, tinnitus, trouble swallowing and voice change.    Eyes: Negative.  Negative for photophobia, pain, discharge, redness, itching and visual disturbance.   Respiratory: Negative.  Negative for apnea, cough, choking, chest tightness, shortness of breath, wheezing and stridor.    Cardiovascular: Negative.  Negative for chest pain, palpitations and leg swelling.   Gastrointestinal: Negative.  Negative for abdominal distention, abdominal pain, anal bleeding, blood in stool, constipation, diarrhea, nausea, rectal pain and vomiting.   Endocrine: Negative.  Negative for cold intolerance, heat intolerance, polydipsia, polyphagia and polyuria.   Genitourinary: Negative.  Negative for decreased urine volume, difficulty urinating, dysuria, enuresis, flank pain, frequency, genital sores, hematuria and urgency.   Musculoskeletal: Positive for arthralgias. Negative for back pain, gait problem, joint swelling, myalgias, neck pain and neck stiffness.   Skin: Negative.  Negative for color change, pallor, rash and wound.   Allergic/Immunologic: Negative.  Negative for environmental allergies, food allergies and immunocompromised state.   Neurological: Negative.  Negative for dizziness, tremors, seizures, syncope, facial asymmetry, speech difficulty, weakness, light-headedness, numbness and headaches.   Hematological: Negative.  Negative for adenopathy. Does not bruise/bleed easily.   Psychiatric/Behavioral: Negative.  Negative for agitation, behavioral problems, confusion, decreased concentration, dysphoric  "mood, hallucinations, self-injury, sleep disturbance and suicidal ideas. The patient is not nervous/anxious and is not hyperactive.         Objective      Physical Exam  Pulse 105   Ht 165 cm (64.96\")   Wt 93.5 kg (206 lb 2.1 oz)   SpO2 98%   BMI 34.34 kg/m²     Body mass index is 34.34 kg/m².    General:   Mental Status:  Alert   Appearance: Cooperative, in no acute distress   Build and Nutrition: Overweight female   Orientation: Alert and oriented to person, place and time   Posture: Normal   Gait: Limping on the right    Integument:   Left wrist: No skin lesions, no rash, no ecchymosis   Right knee: No skin lesions, no rash, no ecchymosis    Upper Extremities:   Left Wrist:    Tenderness:  None    Effusion:  None    Swelling:  None    Range of motion:  Flexion:  80°       Extension:  80°       Pronation:  90°       Supination: 90°  Instability:  None  Deformities:  None    Lower Extremities:   Right Knee:    Tenderness:  Medial joint line tenderness    Effusion:  None    Swelling: None    Crepitus:  Positive    Range of motion:  Extension: 0°       Flexion: 120°  Instability:  No varus laxity, no valgus laxity, negative anterior drawer  Deformities:  Mild varus      Imaging/Studies  Imaging Results (last 24 hours)     Procedure Component Value Units Date/Time    XR Knee 4+ View Right [[id number]] Resulted:  05/15/19 1645     Updated:  05/15/19 1646    Narrative:       Right Knee Radiographs  Indication: right knee pain  Views: Standing AP's and skiers of both knees, with lateral and sunrise   views of the right knee    Comparison: 1/15/2018     Findings:   Mild worsening of degenerative changes seen from the previous films, with   bone-on-bone contact medial compartment, patellofemoral degeneration, and   mild varus alignment.    Impression: Advanced right knee osteoarthritis.    XR Wrist 3+ View Left [[id number]] Resulted:  05/15/19 1643     Updated:  05/15/19 1645    Narrative:       Left Wrist " Radiographs  Indication: left wrist pain  Views: AP, lateral, and oblique views of the left wrist    Comparison: 3/25/2019, with no changes    Findings:  No acute bony abnormalities seen in the wrist, with normal alignment.    Carpometacarpal joint degenerative changes are seen at the base of the   thumb.            Assessment and Plan     Germaine was seen today for follow-up and follow-up.    Diagnoses and all orders for this visit:    Primary osteoarthritis of right knee  -     XR Knee 4+ View Right  -     Case Request; Standing  -     Instructions on coughing, deep breathing, and incentive spirometry.; Future  -     CBC and Differential; Future  -     Basic metabolic panel; Future  -     Protime-INR; Future  -     APTT; Future  -     Hemoglobin A1c; Future  -     Sedimentation rate; Future  -     C-reactive protein; Future  -     Urinalysis With Culture If Indicated - Urine, Clean Catch; Future  -     Tranexamic Acid 1,000 mg in sodium chloride 0.9 % 100 mL  -     Tranexamic Acid 1,000 mg in sodium chloride 0.9 % 100 mL  -     ceFAZolin (ANCEF) 2 g in sodium chloride 0.9 % 100 mL IVPB  -     acetaminophen (TYLENOL) tablet 975 mg  -     meloxicam (MOBIC) tablet 15 mg  -     pregabalin (LYRICA) capsule 150 mg  -     mupirocin (BACTROBAN) 2 % nasal ointment 1 application  -     Case Request    Left wrist pain  -     XR Wrist 3+ View Left    Other orders  -     Outpatient In A Bed; Standing  -     Follow Anesthesia Guidelines / Standing Orders; Future  -     Obtain informed consent  -     Provide instructions to patient regarding NPO status  -     Clorhexidine skin prep  -     Care Order / Instruction for all Female Patients  -     Follow Anesthesia Guidelines / Standing Orders; Standing  -     Nerve Block; Standing  -     Verify NPO Status; Standing  -     SCD (sequential compression device)- to be placed on patient in Pre-op; Standing  -     POC Glucose Once; Standing  -     Clip operative site; Standing  -      Obtain informed consent (if not collected inpatient or PAT); Standing  -     Notify Physician - Standard; Standing  -     NPO After Midnight  -     mupirocin (BACTROBAN NASAL) 2 % nasal ointment; into the nostril(s) as directed by provider 2 (Two) Times a Day.  -     chlorhexidine (HIBICLENS) 4 % external liquid; Apply  topically to the appropriate area as directed Daily. Shower with hibiclens solution as directed for 5 days prior to surgery        1. Primary osteoarthritis of right knee    2. Left wrist pain        I reviewed my findings with the patient today.  Her left wrist pain has resolved, and the x-rays show no bony abnormalities.  Conservative treatment was recommended.  Follow-up as needed for the wrist.    However, her right knee continues to worsen, and she would like to proceed with knee replacement surgery.  Risks, benefits, and alternatives of the procedure have been discussed.  We will plan surgery at a mutually convenient time in the near future, and she wishes to proceed in July timeframe for her calendar.  Please see my procedure note for details.    Surgical Counseling     I have informed the patient of the diagnosis and the prognosis.  Exhaustive conservative treatment modalities have not resulted in long term pain relief.  The symptoms have progressed to the point of daily pain and inability to perform activities of daily living without significant pain. The patient has reached the point of desiring to proceed with total knee arthroplasty after discussing the risks, benefits and alternatives to the procedure.  The surgical procedure itself was discussed in detail. Risks of the procedure were discussed, which included but are not limited to, bleeding, infection, damage to blood vessels and nerves, incomplete pain relief, loosening of the prosthesis, deep infection, need for further surgery, loss of limb, deep venous thrombosis, pulmonary embolus, death, heart attack, stroke, kidney failure,  liver failure, and anesthetic complications.  In addition, the potential for deep infection developing in the future was discussed, which could require further surgery.  The knee would have to be re-opened, debrided, and potentially remove the prosthesis, which may or may not be replaced in the future.  Also, the possibility for loosening of the prosthesis has been mentioned.  If the prosthesis loosened, a revision arthroplasty could be performed, with results that are not as predictable compared to the original procedure.  The typical rehabilitative course has also been discussed, and full recovery may take up to a year to see the maximum benefit.  The importance of patient cooperation in the rehabilitative efforts has also been discussed.  No guarantees whatsoever were given.  The patient understands the potential risks versus the benefits and desires to proceed with total knee arthroplasty at a mutually convenient time.    Return for For surgery as planned.      Medical Decision Making  Management Options : major surgery with risk factors  Data/Risk: radiology tests and independent visualization of imaging, lab tests, or EMG/NCV      Medardo Cosme MD  05/15/19  5:25 PM

## 2019-05-16 PROBLEM — M17.11 PRIMARY OSTEOARTHRITIS OF RIGHT KNEE: Status: ACTIVE | Noted: 2019-05-16

## 2019-07-26 ENCOUNTER — APPOINTMENT (OUTPATIENT)
Dept: MAMMOGRAPHY | Facility: HOSPITAL | Age: 64
End: 2019-07-26

## 2019-09-12 ENCOUNTER — APPOINTMENT (OUTPATIENT)
Dept: PREADMISSION TESTING | Facility: HOSPITAL | Age: 64
End: 2019-09-12

## 2019-09-12 VITALS — HEIGHT: 65 IN | WEIGHT: 210.76 LBS | BODY MASS INDEX: 35.11 KG/M2

## 2019-09-12 DIAGNOSIS — M17.11 PRIMARY OSTEOARTHRITIS OF RIGHT KNEE: ICD-10-CM

## 2019-09-12 LAB
ANION GAP SERPL CALCULATED.3IONS-SCNC: 9 MMOL/L (ref 5–15)
APTT PPP: 34.7 SECONDS (ref 24–37)
BASOPHILS # BLD AUTO: 0.03 10*3/MM3 (ref 0–0.2)
BASOPHILS NFR BLD AUTO: 0.5 % (ref 0–1.5)
BILIRUB UR QL STRIP: NEGATIVE
BUN BLD-MCNC: 13 MG/DL (ref 8–23)
BUN/CREAT SERPL: 14 (ref 7–25)
CALCIUM SPEC-SCNC: 9.8 MG/DL (ref 8.6–10.5)
CHLORIDE SERPL-SCNC: 100 MMOL/L (ref 98–107)
CLARITY UR: CLEAR
CO2 SERPL-SCNC: 30 MMOL/L (ref 22–29)
COLOR UR: YELLOW
CREAT BLD-MCNC: 0.93 MG/DL (ref 0.57–1)
CRP SERPL-MCNC: 0.68 MG/DL (ref 0–0.5)
DEPRECATED RDW RBC AUTO: 42.6 FL (ref 37–54)
EOSINOPHIL # BLD AUTO: 0.22 10*3/MM3 (ref 0–0.4)
EOSINOPHIL NFR BLD AUTO: 3.7 % (ref 0.3–6.2)
ERYTHROCYTE [DISTWIDTH] IN BLOOD BY AUTOMATED COUNT: 12.1 % (ref 12.3–15.4)
ERYTHROCYTE [SEDIMENTATION RATE] IN BLOOD: 32 MM/HR (ref 0–30)
GFR SERPL CREATININE-BSD FRML MDRD: 61 ML/MIN/1.73
GLUCOSE BLD-MCNC: 71 MG/DL (ref 65–99)
GLUCOSE UR STRIP-MCNC: NEGATIVE MG/DL
HBA1C MFR BLD: 5.5 % (ref 4.8–5.6)
HCT VFR BLD AUTO: 39.2 % (ref 34–46.6)
HGB BLD-MCNC: 12.4 G/DL (ref 12–15.9)
HGB UR QL STRIP.AUTO: NEGATIVE
IMM GRANULOCYTES # BLD AUTO: 0.03 10*3/MM3 (ref 0–0.05)
IMM GRANULOCYTES NFR BLD AUTO: 0.5 % (ref 0–0.5)
INR PPP: 0.93 (ref 0.85–1.16)
KETONES UR QL STRIP: NEGATIVE
LEUKOCYTE ESTERASE UR QL STRIP.AUTO: NEGATIVE
LYMPHOCYTES # BLD AUTO: 2.39 10*3/MM3 (ref 0.7–3.1)
LYMPHOCYTES NFR BLD AUTO: 40.3 % (ref 19.6–45.3)
MCH RBC QN AUTO: 30.5 PG (ref 26.6–33)
MCHC RBC AUTO-ENTMCNC: 31.6 G/DL (ref 31.5–35.7)
MCV RBC AUTO: 96.6 FL (ref 79–97)
MONOCYTES # BLD AUTO: 0.5 10*3/MM3 (ref 0.1–0.9)
MONOCYTES NFR BLD AUTO: 8.4 % (ref 5–12)
NEUTROPHILS # BLD AUTO: 2.76 10*3/MM3 (ref 1.7–7)
NEUTROPHILS NFR BLD AUTO: 46.6 % (ref 42.7–76)
NITRITE UR QL STRIP: NEGATIVE
NRBC BLD AUTO-RTO: 0 /100 WBC (ref 0–0.2)
PH UR STRIP.AUTO: 7 [PH] (ref 5–8)
PLATELET # BLD AUTO: 298 10*3/MM3 (ref 140–450)
PMV BLD AUTO: 9.4 FL (ref 6–12)
POTASSIUM BLD-SCNC: 3.9 MMOL/L (ref 3.5–5.2)
PROT UR QL STRIP: NEGATIVE
PROTHROMBIN TIME: 12 SECONDS (ref 11.2–14.3)
RBC # BLD AUTO: 4.06 10*6/MM3 (ref 3.77–5.28)
SODIUM BLD-SCNC: 139 MMOL/L (ref 136–145)
SP GR UR STRIP: 1.02 (ref 1–1.03)
UROBILINOGEN UR QL STRIP: NORMAL
WBC NRBC COR # BLD: 5.93 10*3/MM3 (ref 3.4–10.8)

## 2019-09-12 PROCEDURE — 81003 URINALYSIS AUTO W/O SCOPE: CPT | Performed by: ORTHOPAEDIC SURGERY

## 2019-09-12 PROCEDURE — 85025 COMPLETE CBC W/AUTO DIFF WBC: CPT | Performed by: ORTHOPAEDIC SURGERY

## 2019-09-12 PROCEDURE — 93010 ELECTROCARDIOGRAM REPORT: CPT | Performed by: INTERNAL MEDICINE

## 2019-09-12 PROCEDURE — 85730 THROMBOPLASTIN TIME PARTIAL: CPT | Performed by: ORTHOPAEDIC SURGERY

## 2019-09-12 PROCEDURE — 80048 BASIC METABOLIC PNL TOTAL CA: CPT | Performed by: ORTHOPAEDIC SURGERY

## 2019-09-12 PROCEDURE — 85652 RBC SED RATE AUTOMATED: CPT | Performed by: ORTHOPAEDIC SURGERY

## 2019-09-12 PROCEDURE — 86140 C-REACTIVE PROTEIN: CPT | Performed by: ORTHOPAEDIC SURGERY

## 2019-09-12 PROCEDURE — 85610 PROTHROMBIN TIME: CPT | Performed by: ORTHOPAEDIC SURGERY

## 2019-09-12 PROCEDURE — 36415 COLL VENOUS BLD VENIPUNCTURE: CPT

## 2019-09-12 PROCEDURE — 83036 HEMOGLOBIN GLYCOSYLATED A1C: CPT | Performed by: ORTHOPAEDIC SURGERY

## 2019-09-12 PROCEDURE — 93005 ELECTROCARDIOGRAM TRACING: CPT

## 2019-09-12 RX ORDER — ASPIRIN 81 MG/1
81 TABLET ORAL DAILY
Status: ON HOLD | COMMUNITY
End: 2019-09-27 | Stop reason: SDUPTHER

## 2019-09-12 RX ORDER — LOSARTAN POTASSIUM AND HYDROCHLOROTHIAZIDE 12.5; 5 MG/1; MG/1
1 TABLET ORAL DAILY
COMMUNITY

## 2019-09-12 RX ORDER — ESTRADIOL 1 MG/1
1 TABLET ORAL DAILY
COMMUNITY
End: 2021-08-03 | Stop reason: SDUPTHER

## 2019-09-12 RX ORDER — TURMERIC ROOT EXTRACT 500 MG
1 TABLET ORAL DAILY
COMMUNITY
End: 2020-02-19

## 2019-09-12 ASSESSMENT — KOOS JR
KOOS JR SCORE: 20.941
KOOS JR SCORE: 25

## 2019-09-12 NOTE — PAT
Patient to apply Chlorhexadine wipes  to surgical area (as instructed) the night before procedure and the AM of procedure. Wipes provided.    Patient instructed to drink 20 ounces (or until full) of Gatorade and it needs to be completed 1 hour before given arrival time for procedure (NO RED Gatorade)    Patient verbalized understanding.    Per Anesthesia Request, patient instructed not to take their ACE/ARB medications on the AM of surgery.    MEMORY SCREEN PASSED.    Verified with patient that they received a prescription for Bactroban and Chlorhexidine shower from the office.  Reinforced with them to fill the prescription if they haven't already.  Verbal and written instructions given regarding proper use of the Bactroban and Chlorhexidine to patient and/or famlily during PAT visit. Patient/family also instructed to complete checklist and return it to Pre-op on the day of surgery.  Patient and/or family verbalized understanding.

## 2019-09-16 ENCOUNTER — TELEPHONE (OUTPATIENT)
Dept: ORTHOPEDIC SURGERY | Facility: CLINIC | Age: 64
End: 2019-09-16

## 2019-09-16 NOTE — TELEPHONE ENCOUNTER
----- Message from Medardo Cosme MD sent at 9/12/2019 10:01 PM EDT -----  Should be ok    ----- Message -----  From: Beti Lee  Sent: 9/12/2019   3:34 PM  To: Medardo Cosme MD    CRP .68    SED RATE 32

## 2019-09-24 ENCOUNTER — HOSPITAL ENCOUNTER (OUTPATIENT)
Dept: MAMMOGRAPHY | Facility: HOSPITAL | Age: 64
Discharge: HOME | End: 2019-09-24
Admitting: OBSTETRICS & GYNECOLOGY

## 2019-09-24 DIAGNOSIS — Z12.39 BREAST CANCER SCREENING: ICD-10-CM

## 2019-09-24 PROCEDURE — 77067 SCR MAMMO BI INCL CAD: CPT

## 2019-09-24 PROCEDURE — 77063 BREAST TOMOSYNTHESIS BI: CPT

## 2019-09-25 ENCOUNTER — ANESTHESIA EVENT (OUTPATIENT)
Dept: PERIOP | Facility: HOSPITAL | Age: 64
End: 2019-09-25

## 2019-09-26 ENCOUNTER — APPOINTMENT (OUTPATIENT)
Dept: GENERAL RADIOLOGY | Facility: HOSPITAL | Age: 64
End: 2019-09-26

## 2019-09-26 ENCOUNTER — HOSPITAL ENCOUNTER (OUTPATIENT)
Facility: HOSPITAL | Age: 64
Discharge: HOME | End: 2019-09-27
Attending: ORTHOPAEDIC SURGERY | Admitting: ORTHOPAEDIC SURGERY

## 2019-09-26 ENCOUNTER — ANESTHESIA (OUTPATIENT)
Dept: PERIOP | Facility: HOSPITAL | Age: 64
End: 2019-09-26

## 2019-09-26 DIAGNOSIS — Z74.09 IMPAIRED MOBILITY AND ADLS: ICD-10-CM

## 2019-09-26 DIAGNOSIS — Z74.09 IMPAIRED FUNCTIONAL MOBILITY, BALANCE, GAIT, AND ENDURANCE: ICD-10-CM

## 2019-09-26 DIAGNOSIS — Z96.651 STATUS POST TOTAL RIGHT KNEE REPLACEMENT: Primary | ICD-10-CM

## 2019-09-26 DIAGNOSIS — Z78.9 IMPAIRED MOBILITY AND ADLS: ICD-10-CM

## 2019-09-26 DIAGNOSIS — M17.11 PRIMARY OSTEOARTHRITIS OF RIGHT KNEE: ICD-10-CM

## 2019-09-26 LAB
GLUCOSE BLDC GLUCOMTR-MCNC: 80 MG/DL (ref 70–130)
POTASSIUM BLD-SCNC: 3.7 MMOL/L (ref 3.5–5.2)

## 2019-09-26 PROCEDURE — 25010000003 CEFAZOLIN IN DEXTROSE 2-4 GM/100ML-% SOLUTION: Performed by: ORTHOPAEDIC SURGERY

## 2019-09-26 PROCEDURE — 25010000002 ONDANSETRON PER 1 MG: Performed by: NURSE ANESTHETIST, CERTIFIED REGISTERED

## 2019-09-26 PROCEDURE — C1713 ANCHOR/SCREW BN/BN,TIS/BN: HCPCS | Performed by: ORTHOPAEDIC SURGERY

## 2019-09-26 PROCEDURE — 82962 GLUCOSE BLOOD TEST: CPT

## 2019-09-26 PROCEDURE — 27447 TOTAL KNEE ARTHROPLASTY: CPT | Performed by: ORTHOPAEDIC SURGERY

## 2019-09-26 PROCEDURE — 25010000002 DEXAMETHASONE PER 1 MG: Performed by: NURSE ANESTHETIST, CERTIFIED REGISTERED

## 2019-09-26 PROCEDURE — 97116 GAIT TRAINING THERAPY: CPT

## 2019-09-26 PROCEDURE — 97161 PT EVAL LOW COMPLEX 20 MIN: CPT

## 2019-09-26 PROCEDURE — 73560 X-RAY EXAM OF KNEE 1 OR 2: CPT

## 2019-09-26 PROCEDURE — 25010000002 ROPIVACAINE PER 1 MG: Performed by: NURSE ANESTHETIST, CERTIFIED REGISTERED

## 2019-09-26 PROCEDURE — 84132 ASSAY OF SERUM POTASSIUM: CPT | Performed by: ANESTHESIOLOGY

## 2019-09-26 PROCEDURE — 25010000002 PROPOFOL 10 MG/ML EMULSION: Performed by: NURSE ANESTHETIST, CERTIFIED REGISTERED

## 2019-09-26 PROCEDURE — C1776 JOINT DEVICE (IMPLANTABLE): HCPCS | Performed by: ORTHOPAEDIC SURGERY

## 2019-09-26 PROCEDURE — 25010000002 ROPIVACAINE PER 1 MG: Performed by: ORTHOPAEDIC SURGERY

## 2019-09-26 DEVICE — GEN II 7.5MM RESUR PAT 29MM
Type: IMPLANTABLE DEVICE | Site: KNEE | Status: FUNCTIONAL
Brand: GENESIS II

## 2019-09-26 DEVICE — LEGION NARROW POSTERIOR STABILIZED                                    OXINIUM SIZE 5N RIGHT
Type: IMPLANTABLE DEVICE | Site: KNEE | Status: FUNCTIONAL
Brand: LEGION

## 2019-09-26 DEVICE — CMT BONE SIMPLEX/P FULL DOSE 10/PK: Type: IMPLANTABLE DEVICE | Site: KNEE | Status: FUNCTIONAL

## 2019-09-26 DEVICE — IMPLANTABLE DEVICE: Type: IMPLANTABLE DEVICE | Site: KNEE | Status: FUNCTIONAL

## 2019-09-26 DEVICE — LEGION PS HIGH FLEX XLPE SZ 3-4 10MM
Type: IMPLANTABLE DEVICE | Site: KNEE | Status: FUNCTIONAL
Brand: LEGION

## 2019-09-26 DEVICE — GENESIS II NON-POROUS TIBIAL                                    BASEPLATE SIZE 3 RIGHT
Type: IMPLANTABLE DEVICE | Site: KNEE | Status: FUNCTIONAL
Brand: GENESIS II

## 2019-09-26 RX ORDER — HYDROMORPHONE HYDROCHLORIDE 1 MG/ML
0.5 INJECTION, SOLUTION INTRAMUSCULAR; INTRAVENOUS; SUBCUTANEOUS
Status: DISCONTINUED | OUTPATIENT
Start: 2019-09-26 | End: 2019-09-27 | Stop reason: HOSPADM

## 2019-09-26 RX ORDER — BUPIVACAINE HYDROCHLORIDE 5 MG/ML
INJECTION, SOLUTION PERINEURAL
Status: COMPLETED | OUTPATIENT
Start: 2019-09-26 | End: 2019-09-26

## 2019-09-26 RX ORDER — ESTRADIOL 0.5 MG/1
1 TABLET ORAL DAILY
Status: DISCONTINUED | OUTPATIENT
Start: 2019-09-26 | End: 2019-09-27 | Stop reason: HOSPADM

## 2019-09-26 RX ORDER — SODIUM CHLORIDE 9 MG/ML
120 INJECTION, SOLUTION INTRAVENOUS CONTINUOUS
Status: DISCONTINUED | OUTPATIENT
Start: 2019-09-26 | End: 2019-09-27 | Stop reason: HOSPADM

## 2019-09-26 RX ORDER — HYDROMORPHONE HYDROCHLORIDE 1 MG/ML
0.5 INJECTION, SOLUTION INTRAMUSCULAR; INTRAVENOUS; SUBCUTANEOUS
Status: DISCONTINUED | OUTPATIENT
Start: 2019-09-26 | End: 2019-09-26 | Stop reason: HOSPADM

## 2019-09-26 RX ORDER — ROSUVASTATIN CALCIUM 10 MG/1
10 TABLET, COATED ORAL NIGHTLY
Status: DISCONTINUED | OUTPATIENT
Start: 2019-09-26 | End: 2019-09-27 | Stop reason: HOSPADM

## 2019-09-26 RX ORDER — BISACODYL 10 MG
10 SUPPOSITORY, RECTAL RECTAL DAILY PRN
Status: DISCONTINUED | OUTPATIENT
Start: 2019-09-26 | End: 2019-09-27 | Stop reason: HOSPADM

## 2019-09-26 RX ORDER — ONDANSETRON 2 MG/ML
INJECTION INTRAMUSCULAR; INTRAVENOUS AS NEEDED
Status: DISCONTINUED | OUTPATIENT
Start: 2019-09-26 | End: 2019-09-26 | Stop reason: SURG

## 2019-09-26 RX ORDER — DEXAMETHASONE SODIUM PHOSPHATE 4 MG/ML
INJECTION, SOLUTION INTRA-ARTICULAR; INTRALESIONAL; INTRAMUSCULAR; INTRAVENOUS; SOFT TISSUE AS NEEDED
Status: DISCONTINUED | OUTPATIENT
Start: 2019-09-26 | End: 2019-09-26 | Stop reason: SURG

## 2019-09-26 RX ORDER — MELOXICAM 15 MG/1
15 TABLET ORAL ONCE
Status: COMPLETED | OUTPATIENT
Start: 2019-09-26 | End: 2019-09-26

## 2019-09-26 RX ORDER — SODIUM CHLORIDE 0.9 % (FLUSH) 0.9 %
1-10 SYRINGE (ML) INJECTION AS NEEDED
Status: DISCONTINUED | OUTPATIENT
Start: 2019-09-26 | End: 2019-09-27 | Stop reason: HOSPADM

## 2019-09-26 RX ORDER — BUPROPION HYDROCHLORIDE 150 MG/1
150 TABLET ORAL DAILY
Status: DISCONTINUED | OUTPATIENT
Start: 2019-09-26 | End: 2019-09-27 | Stop reason: HOSPADM

## 2019-09-26 RX ORDER — SODIUM CHLORIDE 0.9 % (FLUSH) 0.9 %
3-10 SYRINGE (ML) INJECTION AS NEEDED
Status: DISCONTINUED | OUTPATIENT
Start: 2019-09-26 | End: 2019-09-26 | Stop reason: HOSPADM

## 2019-09-26 RX ORDER — LABETALOL HYDROCHLORIDE 5 MG/ML
10 INJECTION, SOLUTION INTRAVENOUS EVERY 4 HOURS PRN
Status: DISCONTINUED | OUTPATIENT
Start: 2019-09-26 | End: 2019-09-27 | Stop reason: HOSPADM

## 2019-09-26 RX ORDER — CEFAZOLIN SODIUM 2 G/100ML
2 INJECTION, SOLUTION INTRAVENOUS ONCE
Status: COMPLETED | OUTPATIENT
Start: 2019-09-26 | End: 2019-09-26

## 2019-09-26 RX ORDER — SODIUM CHLORIDE, SODIUM LACTATE, POTASSIUM CHLORIDE, CALCIUM CHLORIDE 600; 310; 30; 20 MG/100ML; MG/100ML; MG/100ML; MG/100ML
9 INJECTION, SOLUTION INTRAVENOUS CONTINUOUS
Status: DISCONTINUED | OUTPATIENT
Start: 2019-09-26 | End: 2019-09-26

## 2019-09-26 RX ORDER — MELOXICAM 7.5 MG/1
15 TABLET ORAL DAILY
Status: DISCONTINUED | OUTPATIENT
Start: 2019-09-26 | End: 2019-09-27 | Stop reason: HOSPADM

## 2019-09-26 RX ORDER — FENTANYL CITRATE 50 UG/ML
50 INJECTION, SOLUTION INTRAMUSCULAR; INTRAVENOUS
Status: DISCONTINUED | OUTPATIENT
Start: 2019-09-26 | End: 2019-09-26 | Stop reason: HOSPADM

## 2019-09-26 RX ORDER — DOCUSATE SODIUM 100 MG/1
100 CAPSULE, LIQUID FILLED ORAL 2 TIMES DAILY PRN
Status: DISCONTINUED | OUTPATIENT
Start: 2019-09-26 | End: 2019-09-27 | Stop reason: HOSPADM

## 2019-09-26 RX ORDER — PROMETHAZINE HYDROCHLORIDE 25 MG/1
25 TABLET ORAL ONCE AS NEEDED
Status: DISCONTINUED | OUTPATIENT
Start: 2019-09-26 | End: 2019-09-26 | Stop reason: HOSPADM

## 2019-09-26 RX ORDER — ONDANSETRON 2 MG/ML
4 INJECTION INTRAMUSCULAR; INTRAVENOUS EVERY 6 HOURS PRN
Status: DISCONTINUED | OUTPATIENT
Start: 2019-09-26 | End: 2019-09-27 | Stop reason: HOSPADM

## 2019-09-26 RX ORDER — PROMETHAZINE HYDROCHLORIDE 25 MG/ML
6.25 INJECTION, SOLUTION INTRAMUSCULAR; INTRAVENOUS ONCE AS NEEDED
Status: DISCONTINUED | OUTPATIENT
Start: 2019-09-26 | End: 2019-09-26 | Stop reason: HOSPADM

## 2019-09-26 RX ORDER — MORPHINE SULFATE 2 MG/ML
4 INJECTION, SOLUTION INTRAMUSCULAR; INTRAVENOUS
Status: DISCONTINUED | OUTPATIENT
Start: 2019-09-26 | End: 2019-09-27 | Stop reason: HOSPADM

## 2019-09-26 RX ORDER — ACETAMINOPHEN 500 MG
1000 TABLET ORAL ONCE
Status: COMPLETED | OUTPATIENT
Start: 2019-09-26 | End: 2019-09-26

## 2019-09-26 RX ORDER — VENLAFAXINE HYDROCHLORIDE 75 MG/1
150 CAPSULE, EXTENDED RELEASE ORAL DAILY
Status: DISCONTINUED | OUTPATIENT
Start: 2019-09-27 | End: 2019-09-27 | Stop reason: HOSPADM

## 2019-09-26 RX ORDER — MAGNESIUM HYDROXIDE 1200 MG/15ML
LIQUID ORAL AS NEEDED
Status: DISCONTINUED | OUTPATIENT
Start: 2019-09-26 | End: 2019-09-26 | Stop reason: HOSPADM

## 2019-09-26 RX ORDER — ASPIRIN 325 MG
325 TABLET, DELAYED RELEASE (ENTERIC COATED) ORAL DAILY
Status: DISCONTINUED | OUTPATIENT
Start: 2019-09-27 | End: 2019-09-27 | Stop reason: HOSPADM

## 2019-09-26 RX ORDER — ACETAMINOPHEN 650 MG/1
650 SUPPOSITORY RECTAL EVERY 4 HOURS PRN
Status: DISCONTINUED | OUTPATIENT
Start: 2019-09-26 | End: 2019-09-27 | Stop reason: HOSPADM

## 2019-09-26 RX ORDER — NALOXONE HCL 0.4 MG/ML
0.1 VIAL (ML) INJECTION
Status: DISCONTINUED | OUTPATIENT
Start: 2019-09-26 | End: 2019-09-27 | Stop reason: HOSPADM

## 2019-09-26 RX ORDER — LIDOCAINE HYDROCHLORIDE 10 MG/ML
0.5 INJECTION, SOLUTION EPIDURAL; INFILTRATION; INTRACAUDAL; PERINEURAL ONCE AS NEEDED
Status: COMPLETED | OUTPATIENT
Start: 2019-09-26 | End: 2019-09-26

## 2019-09-26 RX ORDER — ACETAMINOPHEN 160 MG/5ML
650 SOLUTION ORAL EVERY 4 HOURS PRN
Status: DISCONTINUED | OUTPATIENT
Start: 2019-09-26 | End: 2019-09-27 | Stop reason: HOSPADM

## 2019-09-26 RX ORDER — FAMOTIDINE 20 MG/1
20 TABLET, FILM COATED ORAL ONCE
Status: COMPLETED | OUTPATIENT
Start: 2019-09-26 | End: 2019-09-26

## 2019-09-26 RX ORDER — PROMETHAZINE HYDROCHLORIDE 25 MG/1
25 SUPPOSITORY RECTAL ONCE AS NEEDED
Status: DISCONTINUED | OUTPATIENT
Start: 2019-09-26 | End: 2019-09-26 | Stop reason: HOSPADM

## 2019-09-26 RX ORDER — NALOXONE HCL 0.4 MG/ML
0.4 VIAL (ML) INJECTION
Status: DISCONTINUED | OUTPATIENT
Start: 2019-09-26 | End: 2019-09-27 | Stop reason: HOSPADM

## 2019-09-26 RX ORDER — HYDROCODONE BITARTRATE AND ACETAMINOPHEN 5; 325 MG/1; MG/1
1 TABLET ORAL EVERY 4 HOURS PRN
Status: DISCONTINUED | OUTPATIENT
Start: 2019-09-26 | End: 2019-09-27 | Stop reason: HOSPADM

## 2019-09-26 RX ORDER — LOSARTAN POTASSIUM 25 MG/1
50 TABLET ORAL
Status: DISCONTINUED | OUTPATIENT
Start: 2019-09-26 | End: 2019-09-27 | Stop reason: HOSPADM

## 2019-09-26 RX ORDER — ACETAMINOPHEN 325 MG/1
650 TABLET ORAL EVERY 4 HOURS PRN
Status: DISCONTINUED | OUTPATIENT
Start: 2019-09-26 | End: 2019-09-27 | Stop reason: HOSPADM

## 2019-09-26 RX ORDER — ROPIVACAINE HYDROCHLORIDE 5 MG/ML
INJECTION, SOLUTION EPIDURAL; INFILTRATION; PERINEURAL AS NEEDED
Status: DISCONTINUED | OUTPATIENT
Start: 2019-09-26 | End: 2019-09-26 | Stop reason: HOSPADM

## 2019-09-26 RX ORDER — SODIUM CHLORIDE 0.9 % (FLUSH) 0.9 %
3 SYRINGE (ML) INJECTION EVERY 12 HOURS SCHEDULED
Status: DISCONTINUED | OUTPATIENT
Start: 2019-09-26 | End: 2019-09-26 | Stop reason: HOSPADM

## 2019-09-26 RX ORDER — ONDANSETRON 4 MG/1
4 TABLET, FILM COATED ORAL EVERY 6 HOURS PRN
Status: DISCONTINUED | OUTPATIENT
Start: 2019-09-26 | End: 2019-09-27 | Stop reason: HOSPADM

## 2019-09-26 RX ORDER — BUPIVACAINE HYDROCHLORIDE 2.5 MG/ML
INJECTION, SOLUTION EPIDURAL; INFILTRATION; INTRACAUDAL
Status: DISCONTINUED | OUTPATIENT
Start: 2019-09-26 | End: 2019-09-26 | Stop reason: SURG

## 2019-09-26 RX ORDER — FAMOTIDINE 10 MG/ML
20 INJECTION, SOLUTION INTRAVENOUS ONCE
Status: CANCELLED | OUTPATIENT
Start: 2019-09-26 | End: 2019-09-26

## 2019-09-26 RX ORDER — ONDANSETRON 2 MG/ML
4 INJECTION INTRAMUSCULAR; INTRAVENOUS EVERY 6 HOURS PRN
Status: DISCONTINUED | OUTPATIENT
Start: 2019-09-26 | End: 2019-09-27 | Stop reason: SDUPTHER

## 2019-09-26 RX ORDER — SODIUM CHLORIDE 0.9 % (FLUSH) 0.9 %
3 SYRINGE (ML) INJECTION EVERY 12 HOURS SCHEDULED
Status: DISCONTINUED | OUTPATIENT
Start: 2019-09-26 | End: 2019-09-27 | Stop reason: HOSPADM

## 2019-09-26 RX ORDER — BISACODYL 5 MG/1
10 TABLET, DELAYED RELEASE ORAL DAILY PRN
Status: DISCONTINUED | OUTPATIENT
Start: 2019-09-26 | End: 2019-09-27 | Stop reason: HOSPADM

## 2019-09-26 RX ORDER — CEFAZOLIN SODIUM 2 G/100ML
2 INJECTION, SOLUTION INTRAVENOUS EVERY 8 HOURS
Status: COMPLETED | OUTPATIENT
Start: 2019-09-26 | End: 2019-09-27

## 2019-09-26 RX ORDER — PREGABALIN 150 MG/1
150 CAPSULE ORAL ONCE
Status: COMPLETED | OUTPATIENT
Start: 2019-09-26 | End: 2019-09-26

## 2019-09-26 RX ORDER — FLUOXETINE HYDROCHLORIDE 20 MG/1
40 CAPSULE ORAL DAILY
Status: DISCONTINUED | OUTPATIENT
Start: 2019-09-27 | End: 2019-09-26

## 2019-09-26 RX ADMIN — PROPOFOL 45 MCG/KG/MIN: 10 INJECTION, EMULSION INTRAVENOUS at 10:31

## 2019-09-26 RX ADMIN — SODIUM CHLORIDE, POTASSIUM CHLORIDE, SODIUM LACTATE AND CALCIUM CHLORIDE 9 ML/HR: 600; 310; 30; 20 INJECTION, SOLUTION INTRAVENOUS at 08:51

## 2019-09-26 RX ADMIN — LIDOCAINE HYDROCHLORIDE 0.2 ML: 10 INJECTION, SOLUTION EPIDURAL; INFILTRATION; INTRACAUDAL; PERINEURAL at 08:51

## 2019-09-26 RX ADMIN — HYDROCODONE BITARTRATE AND ACETAMINOPHEN 1 TABLET: 5; 325 TABLET ORAL at 21:30

## 2019-09-26 RX ADMIN — ESTRADIOL 1 MG: 0.5 TABLET ORAL at 16:39

## 2019-09-26 RX ADMIN — ROPIVACAINE HYDROCHLORIDE 10 ML/HR: 5 INJECTION, SOLUTION EPIDURAL; INFILTRATION; PERINEURAL at 12:47

## 2019-09-26 RX ADMIN — Medication 3 ML: at 21:52

## 2019-09-26 RX ADMIN — BUPROPION HYDROCHLORIDE 150 MG: 150 TABLET, FILM COATED, EXTENDED RELEASE ORAL at 16:39

## 2019-09-26 RX ADMIN — MELOXICAM 15 MG: 7.5 TABLET ORAL at 16:39

## 2019-09-26 RX ADMIN — SODIUM CHLORIDE 120 ML/HR: 9 INJECTION, SOLUTION INTRAVENOUS at 12:54

## 2019-09-26 RX ADMIN — TRANEXAMIC ACID 1000 MG: 100 INJECTION, SOLUTION INTRAVENOUS at 10:34

## 2019-09-26 RX ADMIN — TRANEXAMIC ACID 1000 MG: 100 INJECTION, SOLUTION INTRAVENOUS at 11:21

## 2019-09-26 RX ADMIN — ONDANSETRON 4 MG: 2 INJECTION INTRAMUSCULAR; INTRAVENOUS at 11:21

## 2019-09-26 RX ADMIN — DEXAMETHASONE SODIUM PHOSPHATE 8 MG: 4 INJECTION, SOLUTION INTRAMUSCULAR; INTRAVENOUS at 10:54

## 2019-09-26 RX ADMIN — CEFAZOLIN SODIUM 2 G: 2 INJECTION, SOLUTION INTRAVENOUS at 17:40

## 2019-09-26 RX ADMIN — ACETAMINOPHEN 1000 MG: 500 TABLET ORAL at 08:56

## 2019-09-26 RX ADMIN — LOSARTAN POTASSIUM 50 MG: 50 TABLET ORAL at 16:39

## 2019-09-26 RX ADMIN — FAMOTIDINE 20 MG: 20 TABLET ORAL at 08:56

## 2019-09-26 RX ADMIN — MELOXICAM 15 MG: 15 TABLET ORAL at 08:56

## 2019-09-26 RX ADMIN — POLYETHYLENE GLYCOL 3350 17 G: 17 POWDER, FOR SOLUTION ORAL at 21:34

## 2019-09-26 RX ADMIN — PREGABALIN 150 MG: 150 CAPSULE ORAL at 08:56

## 2019-09-26 RX ADMIN — CEFAZOLIN SODIUM 2 G: 2 INJECTION, SOLUTION INTRAVENOUS at 10:22

## 2019-09-26 RX ADMIN — BUPIVACAINE HYDROCHLORIDE 20 ML: 2.5 INJECTION, SOLUTION EPIDURAL; INFILTRATION; INTRACAUDAL; PERINEURAL at 12:30

## 2019-09-26 RX ADMIN — SODIUM CHLORIDE 120 ML/HR: 9 INJECTION, SOLUTION INTRAVENOUS at 21:30

## 2019-09-26 RX ADMIN — BUPIVACAINE HYDROCHLORIDE 1.8 ML: 5 INJECTION, SOLUTION PERINEURAL at 10:28

## 2019-09-26 RX ADMIN — MUPIROCIN 1 APPLICATION: 20 OINTMENT TOPICAL at 08:58

## 2019-09-26 RX ADMIN — ROSUVASTATIN CALCIUM 10 MG: 10 TABLET, FILM COATED ORAL at 21:30

## 2019-09-26 RX ADMIN — HYDROCODONE BITARTRATE AND ACETAMINOPHEN 1 TABLET: 5; 325 TABLET ORAL at 16:39

## 2019-09-26 NOTE — ANESTHESIA PROCEDURE NOTES
Peripheral Block      Patient reassessed immediately prior to procedure    Patient location during procedure: post-op  Reason for block: at surgeon's request and post-op pain management  Performed by  CRNA: Kerry Sosa CRNA  Assisted by: Janiya Delgado RN  Preanesthetic Checklist  Completed: patient identified, site marked, surgical consent, pre-op evaluation, timeout performed, IV checked, risks and benefits discussed and monitors and equipment checked  Prep:  Pt Position: supine  Sterile barriers:cap, gloves, mask and sterile barriers  Prep: ChloraPrep  Patient monitoring: blood pressure monitoring, continuous pulse oximetry and EKG  Procedure  Performed under: spinal  Guidance:ultrasound guided  Images:still images obtained, printed/placed on chart    Laterality:right  Block Type:adductor canal block  Injection Technique:catheter  Needle Type:Tuohy and echogenic  Needle Gauge:18 G  Resistance on Injection: none  Catheter Size:20 G (20g)  Cath Depth at skin: 11 cm    Medications Used: bupivacaine PF (MARCAINE) 0.25 % injection, 20 mL  Med admintered at 9/26/2019 12:30 PM      Post Assessment  Injection Assessment: negative aspiration for heme, incremental injection and no paresthesia on injection  Patient Tolerance:comfortable throughout block  Complications:no  Additional Notes  Procedure:             The pt was placed in the Supine position.  The Insertion site was  prepped and Draped in sterile fashion.  The pt was anesthetized with  IV Sedation( see meds).  Skin and cutaneous tissue was infiltrated and anesthetized with 1% Lidocaine 3 mls via a 25g needle.  A BBraun 4 inch 18g echogenic needle was then  inserted approximately midline, mid-thigh and advanced In-plane with Ultrasound guidance.  Normal Saline PSF was utilized for hydrodissection of tissue.  The Vastus medialis and Sartorius muscle where visualized and the needle tip was placed in the adductor canal,  lateral to the femoral artery.   LA injection spread was visualized, injection was incremental 1-5ml, injection pressure was normal or little, no intraneural injection, no vascular injection.  LA dose was injected thru the needle(see dose above).  A BBraun 20g wire stylet catheter was placed via the needle with ultrasound visualization and confirmation with NS fluid bolus. The labeled Catheter was then secured to skin at insertion site with skin afix and steristrips to curled catheter and CHG transparent dressing.  Thank you.

## 2019-09-26 NOTE — ANESTHESIA POSTPROCEDURE EVALUATION
Patient: Germaine Jett    Procedure Summary     Date:  09/26/19 [facility] / [facility]    Anesthesia Start:  1022 Anesthesia Stop:  1235    Procedure:  TOTAL KNEE ARTHROPLASTY RIGHT (Right Knee) Diagnosis:       Primary osteoarthritis of right knee      (Primary osteoarthritis of right knee [M17.11])    Surgeon:  Medardo Cosme MD Provider:  Jean-Pierre Heath MD    Anesthesia Type:  regional, spinal ASA Status:  2          Anesthesia Type: regional, spinal  Last vitals  BP   94/63 (09/26/19 1240)   Temp   97.7 °F (36.5 °C) (09/26/19 1240)   Pulse   75 (09/26/19 1245)   Resp   16 (09/26/19 1240)     SpO2   93 % (09/26/19 1245)     Post Anesthesia Care and Evaluation    Patient location during evaluation: PACU  Patient participation: complete - patient participated  Level of consciousness: awake and alert  Pain score: 0  Pain management: adequate  Airway patency: patent  Anesthetic complications: No anesthetic complications  PONV Status: none  Cardiovascular status: hemodynamically stable and acceptable  Respiratory status: nonlabored ventilation, acceptable and nasal cannula  Hydration status: acceptable

## 2019-09-26 NOTE — ANESTHESIA PREPROCEDURE EVALUATION
Anesthesia Evaluation     Patient summary reviewed and Nursing notes reviewed   no history of anesthetic complications:  NPO Solid Status: > 8 hours  NPO Liquid Status: > 2 hours           Airway   Mallampati: I  TM distance: >3 FB  Neck ROM: full  No difficulty expected  Dental - normal exam     Pulmonary     breath sounds clear to auscultation  (+) shortness of breath,   Cardiovascular   Exercise tolerance: good (4-7 METS)    Rate: normal    (+) hypertension well controlled 2 medications or greater, hyperlipidemia,       Neuro/Psych  (+) psychiatric history Depression,     GI/Hepatic/Renal/Endo    (+) morbid obesity,  hypothyroidism,     Musculoskeletal     Abdominal   (+) obese,     Abdomen: soft.   Substance History      OB/GYN          Other   (+) arthritis                     Anesthesia Plan    ASA 2     regional and spinal     intravenous induction   Anesthetic plan, all risks, benefits, and alternatives have been provided, discussed and informed consent has been obtained with: patient.    Plan discussed with CRNA.

## 2019-09-26 NOTE — ANESTHESIA PROCEDURE NOTES
Spinal Block      Patient reassessed immediately prior to procedure    Patient location during procedure: OR  Indication:at surgeon's request  Performed By  CRNA: Kerry Sosa CRNA  Preanesthetic Checklist  Completed: patient identified, site marked, surgical consent, pre-op evaluation, timeout performed, IV checked, risks and benefits discussed and monitors and equipment checked  Spinal Block Prep:  Patient Position:sitting  Sterile Tech:cap, gloves, sterile barriers and mask  Prep:Chloraprep  Patient Monitoring:blood pressure monitoring, continuous pulse oximetry and EKG  Spinal Block Procedure  Approach:midline  Guidance:landmark technique and palpation technique  Location:L4-L5  Needle Type:Sprotte  Needle Gauge:25 G  Placement of Spinal needle event:cerebrospinal fluid aspirated  Paresthesia: no  Fluid Appearance:clear  Medications: bupivacaine (MARCAINE) 0.5 % injection, 1.8 mL  Med Administered at 9/26/2019 10:28 AM   Post Assessment  Patient Tolerance:patient tolerated the procedure well with no apparent complications  Complications no  Additional Notes  Procedure:  Pt assisted to sitting position, with legs in position of comfort over side of bed.  Pt. instructed in optimal spine presentation, the spine was prepped/ Draped and the skin at insertion site was anesthetized with 1% Lidocaine 2 ml.  The spinal needle was then advanced until CSF flow was obtained and LA was injected:

## 2019-09-27 VITALS
BODY MASS INDEX: 34.99 KG/M2 | TEMPERATURE: 97.5 F | HEIGHT: 65 IN | RESPIRATION RATE: 16 BRPM | SYSTOLIC BLOOD PRESSURE: 100 MMHG | OXYGEN SATURATION: 99 % | HEART RATE: 80 BPM | WEIGHT: 210 LBS | DIASTOLIC BLOOD PRESSURE: 54 MMHG

## 2019-09-27 PROBLEM — D62 ACUTE BLOOD LOSS ANEMIA: Status: ACTIVE | Noted: 2019-09-27

## 2019-09-27 PROBLEM — D72.829 LEUKOCYTOSIS: Status: ACTIVE | Noted: 2019-09-27

## 2019-09-27 PROBLEM — G89.18 ACUTE POSTOPERATIVE PAIN: Status: ACTIVE | Noted: 2019-09-27

## 2019-09-27 LAB
ANION GAP SERPL CALCULATED.3IONS-SCNC: 12 MMOL/L (ref 5–15)
BUN BLD-MCNC: 13 MG/DL (ref 8–23)
BUN/CREAT SERPL: 13.5 (ref 7–25)
CALCIUM SPEC-SCNC: 8.9 MG/DL (ref 8.6–10.5)
CHLORIDE SERPL-SCNC: 103 MMOL/L (ref 98–107)
CO2 SERPL-SCNC: 22 MMOL/L (ref 22–29)
CREAT BLD-MCNC: 0.96 MG/DL (ref 0.57–1)
DEPRECATED RDW RBC AUTO: 44.9 FL (ref 37–54)
ERYTHROCYTE [DISTWIDTH] IN BLOOD BY AUTOMATED COUNT: 12.5 % (ref 12.3–15.4)
GFR SERPL CREATININE-BSD FRML MDRD: 58 ML/MIN/1.73
GLUCOSE BLD-MCNC: 149 MG/DL (ref 65–99)
HCT VFR BLD AUTO: 33.1 % (ref 34–46.6)
HGB BLD-MCNC: 10.4 G/DL (ref 12–15.9)
MCH RBC QN AUTO: 31 PG (ref 26.6–33)
MCHC RBC AUTO-ENTMCNC: 31.4 G/DL (ref 31.5–35.7)
MCV RBC AUTO: 98.5 FL (ref 79–97)
PLATELET # BLD AUTO: 276 10*3/MM3 (ref 140–450)
PMV BLD AUTO: 9.9 FL (ref 6–12)
POTASSIUM BLD-SCNC: 3.8 MMOL/L (ref 3.5–5.2)
RBC # BLD AUTO: 3.36 10*6/MM3 (ref 3.77–5.28)
SODIUM BLD-SCNC: 137 MMOL/L (ref 136–145)
WBC NRBC COR # BLD: 11.18 10*3/MM3 (ref 3.4–10.8)

## 2019-09-27 PROCEDURE — 97535 SELF CARE MNGMENT TRAINING: CPT

## 2019-09-27 PROCEDURE — 80048 BASIC METABOLIC PNL TOTAL CA: CPT | Performed by: NURSE PRACTITIONER

## 2019-09-27 PROCEDURE — 97110 THERAPEUTIC EXERCISES: CPT

## 2019-09-27 PROCEDURE — 99024 POSTOP FOLLOW-UP VISIT: CPT | Performed by: ORTHOPAEDIC SURGERY

## 2019-09-27 PROCEDURE — 25010000003 CEFAZOLIN IN DEXTROSE 2-4 GM/100ML-% SOLUTION: Performed by: ORTHOPAEDIC SURGERY

## 2019-09-27 PROCEDURE — 97166 OT EVAL MOD COMPLEX 45 MIN: CPT

## 2019-09-27 PROCEDURE — 85027 COMPLETE CBC AUTOMATED: CPT | Performed by: ORTHOPAEDIC SURGERY

## 2019-09-27 PROCEDURE — 97116 GAIT TRAINING THERAPY: CPT

## 2019-09-27 RX ORDER — DOCUSATE SODIUM 100 MG/1
100 CAPSULE, LIQUID FILLED ORAL 2 TIMES DAILY PRN
Qty: 60 CAPSULE | Refills: 0 | Status: SHIPPED | OUTPATIENT
Start: 2019-09-27 | End: 2020-02-19

## 2019-09-27 RX ORDER — ASPIRIN 81 MG/1
81 TABLET ORAL DAILY
Start: 2019-09-27

## 2019-09-27 RX ORDER — HYDROCODONE BITARTRATE AND ACETAMINOPHEN 5; 325 MG/1; MG/1
1 TABLET ORAL EVERY 4 HOURS PRN
Qty: 40 TABLET | Refills: 0 | Status: SHIPPED | OUTPATIENT
Start: 2019-09-27 | End: 2019-10-27

## 2019-09-27 RX ORDER — ASPIRIN 325 MG
325 TABLET ORAL DAILY
Qty: 30 TABLET | Refills: 0 | Status: SHIPPED | OUTPATIENT
Start: 2019-09-28 | End: 2020-02-19

## 2019-09-27 RX ADMIN — LOSARTAN POTASSIUM 50 MG: 50 TABLET ORAL at 09:01

## 2019-09-27 RX ADMIN — POLYETHYLENE GLYCOL 3350 17 G: 17 POWDER, FOR SOLUTION ORAL at 09:01

## 2019-09-27 RX ADMIN — ESTRADIOL 1 MG: 0.5 TABLET ORAL at 09:01

## 2019-09-27 RX ADMIN — HYDROCODONE BITARTRATE AND ACETAMINOPHEN 1 TABLET: 5; 325 TABLET ORAL at 01:29

## 2019-09-27 RX ADMIN — MELOXICAM 15 MG: 7.5 TABLET ORAL at 09:00

## 2019-09-27 RX ADMIN — CEFAZOLIN SODIUM 2 G: 2 INJECTION, SOLUTION INTRAVENOUS at 01:16

## 2019-09-27 RX ADMIN — ASPIRIN 325 MG: 325 TABLET, DELAYED RELEASE ORAL at 09:00

## 2019-09-27 RX ADMIN — DOCUSATE SODIUM 100 MG: 100 CAPSULE, LIQUID FILLED ORAL at 09:00

## 2019-09-27 RX ADMIN — HYDROCODONE BITARTRATE AND ACETAMINOPHEN 1 TABLET: 5; 325 TABLET ORAL at 05:54

## 2019-09-27 RX ADMIN — HYDROCODONE BITARTRATE AND ACETAMINOPHEN 1 TABLET: 5; 325 TABLET ORAL at 10:27

## 2019-09-27 RX ADMIN — VENLAFAXINE HYDROCHLORIDE 150 MG: 75 CAPSULE, EXTENDED RELEASE ORAL at 10:27

## 2019-09-27 RX ADMIN — BUPROPION HYDROCHLORIDE 150 MG: 150 TABLET, FILM COATED, EXTENDED RELEASE ORAL at 09:01

## 2019-10-01 NOTE — PROGRESS NOTES
ERNIE Nicolas    Nerve Cath Post Op Call    Patient Name: Germaine Jett  :  1955  MRN:  [id number]  Date of Discharge: 2019    Nerve Cath Post Op Call:    Catheter Plan:Patient/Family member report nerve catheter previously discontinued, tip intact

## 2019-10-03 ENCOUNTER — TELEPHONE (OUTPATIENT)
Dept: ORTHOPEDIC SURGERY | Facility: CLINIC | Age: 64
End: 2019-10-03

## 2019-10-03 NOTE — TELEPHONE ENCOUNTER
I called her and explained that yes it is ok that she took a shower. She expressed understanding and will call with any further questions or concerns she may have.  Roxy

## 2019-10-03 NOTE — TELEPHONE ENCOUNTER
HAD KNEE SURGERY LAST Thursday 9/26/19, SAID SHE WAS TOLD SHE COULD TAKE A SHOWER, SO SHE TOOK ONE ON Monday, BUT WAS TOLD BY ANOTHER PATIENT OF DR. LALA THAT SHE WASN'T SUPPOSED TO TAKE A SHOWER UNTIL A WEEK AFTER SURGERY. AFRAID SHE HAS MESSED SOMETHING UP. CALL BACK [phone]

## 2019-10-07 ENCOUNTER — TELEPHONE (OUTPATIENT)
Dept: ORTHOPEDIC SURGERY | Facility: CLINIC | Age: 64
End: 2019-10-07

## 2019-10-07 NOTE — TELEPHONE ENCOUNTER
I called patient and let her know that typically patients will transition to OP PT around the time that they come in for their PO appt. She is doing the exercises that the therapist showed her in between visits from them. She was also concerned about needing to take her pain medicine due to being worried that she would run out. I explained that she is jsut a week out from surgery, and that if she still needs them for times besides PT, it is ok to take them. She can call fora  Refill if she needs it later on. She told me that she has 23 pills still left at this time. She will call back with any further problems or questions.     Patsy

## 2019-10-07 NOTE — TELEPHONE ENCOUNTER
PATIENT CALLED TO FOLLOW UP AND SEE WHEN DR. LALA WOULD LIKE HER TO BEGIN DOING OUTPATIENT PHYSCIAL THERAPY. SHE CAN BE REACHED [phone].

## 2019-10-21 ENCOUNTER — OFFICE VISIT (OUTPATIENT)
Dept: ORTHOPEDIC SURGERY | Facility: CLINIC | Age: 64
End: 2019-10-21

## 2019-10-21 DIAGNOSIS — Z47.89 ORTHOPEDIC AFTERCARE: ICD-10-CM

## 2019-10-21 DIAGNOSIS — Z96.651 STATUS POST TOTAL RIGHT KNEE REPLACEMENT: Primary | ICD-10-CM

## 2019-10-21 PROCEDURE — 99024 POSTOP FOLLOW-UP VISIT: CPT | Performed by: ORTHOPAEDIC SURGERY

## 2019-10-21 NOTE — PROGRESS NOTES
[facility] [place] [facility] Note    Subjective     Chief Complaint   Patient presents with   • Post-op     3 weeks status post Right Total Knee Arthroplasty 09/26/2019        HPI    Germaine Jett is a 64 y.o. female.  She follows up today for her right total knee arthroplasty.  She is doing well today, and has had 100% relief compared to preoperative symptoms.  She is able to walk without a cane, but she has a cane with her today.  She is making improvements with physical therapy.      Patient Active Problem List   Diagnosis   • Palpitations   • Hypertension   • Hyperlipidemia   • Obesity (BMI 30.0-34.9)   • Hypercholesterolemia   • Depression   • Knee osteoarthritis   • Hypothyroid   • Insomnia   • Primary osteoarthritis of right knee   • Status post total right knee replacement   • Leukocytosis, mild, likely reactive   • Acute blood loss anemia, mild, asymptomatic   • Acute postoperative pain     Past Medical History:   Diagnosis Date   • Acute pain of left knee    • Depression     Hx of   • Dyspnea    • Edema    • Hypercholesterolemia    • Hyperlipidemia    • Hypertension    • Hypothyroid      10/14 TSH 5.320;  04/15 TSH 4.580   • Insomnia    • Knee osteoarthritis     RT. meniscal scope, Dr. Alvino Cifuentes, 01/15. removal of ganglion cyst 01/15   • Obesity (BMI 30.0-34.9)     BMI 33.66   • Palpitations    • Wears glasses     READING      Past Surgical History:   Procedure Laterality Date   • APPENDECTOMY     • BLADDER SURGERY     • BREAST BIOPSY Left    • COLONOSCOPY  2017   • GANGLION CYST EXCISION  01/2015    R KNEE   • HYSTERECTOMY     • INCISIONAL BREAST BIOPSY     • KNEE ARTHROSCOPY Right    • TONSILLECTOMY AND ADENOIDECTOMY     • TOTAL KNEE ARTHROPLASTY Right 9/26/2019    Procedure: TOTAL KNEE ARTHROPLASTY RIGHT;  Surgeon: Medardo Cosme MD;  Location: [facility];  Service: Orthopedics      Family History   Problem Relation Age of Onset   • Heart attack Mother         Acute MI   • Hypertension  Mother    • Stroke Mother    • Depression Mother    • Heart disease Mother    • Osteoarthritis Mother    • Heart attack Father 63           • Heart disease Father    • Hypertension Father    • Depression Other    • Heart disease Other    • Hypertension Other      Social History     Socioeconomic History   • Marital status:      Spouse name: Not on file   • Number of children: Not on file   • Years of education: Not on file   • Highest education level: Not on file   Tobacco Use   • Smoking status: Never Smoker   • Smokeless tobacco: Never Used   Substance and Sexual Activity   • Alcohol use: No   • Drug use: No   • Sexual activity: Defer      Current Outpatient Medications on File Prior to Visit   Medication Sig Dispense Refill   • Acetaminophen (TYLENOL ARTHRITIS PAIN PO) Take  by mouth Take As Directed.     • aspirin 325 MG tablet Take 1 tablet by mouth Daily. 30 tablet 0   • aspirin 81 MG EC tablet Take 1 tablet by mouth Daily. Resume in 1 month     • buPROPion XL (WELLBUTRIN XL) 150 MG 24 hr tablet Take 150 mg by mouth Daily.  0   • docusate sodium (COLACE) 100 MG capsule Take 1 capsule by mouth 2 (Two) Times a Day As Needed for Constipation. 60 capsule 0   • estradiol (ESTRACE) 1 MG tablet Take 1 mg by mouth Daily.     • FLUoxetine (PROzac) 40 MG capsule Every Morning.  0   • HYDROcodone-acetaminophen (NORCO) 5-325 MG per tablet Take 1 tablet by mouth Every 4 (Four) Hours As Needed for Moderate Pain  for up to 30 days. 40 tablet 0   • losartan-hydrochlorothiazide (HYZAAR) 50-12.5 MG per tablet Take 1 tablet by mouth Daily.     • rosuvastatin (CRESTOR) 10 MG tablet Take 10 mg by mouth Every Night.  0   • traZODone (DESYREL) 50 MG tablet      • Turmeric 500 MG tablet Take 1 tablet by mouth Daily.     • venlafaxine XR (EFFEXOR-XR) 150 MG 24 hr capsule Take 150 mg by mouth Daily.  0     No current facility-administered medications on file prior to visit.       Allergies   Allergen Reactions   • Benadryl  [Diphenhydramine] Other (See Comments)     SLEEPY   • Adhesive Tape Rash   • Percocet [Oxycodone-Acetaminophen] Other (See Comments)     SEVERE ITCHING IN HEAD        Review of Systems   Constitutional: Negative for activity change, appetite change, chills, diaphoresis, fatigue, fever and unexpected weight change.   HENT: Negative for congestion, dental problem, drooling, ear discharge, ear pain, facial swelling, hearing loss, mouth sores, nosebleeds, postnasal drip, rhinorrhea, sinus pressure, sneezing, sore throat, tinnitus, trouble swallowing and voice change.    Eyes: Negative for photophobia, pain, discharge, redness, itching and visual disturbance.   Respiratory: Negative for apnea, cough, choking, chest tightness, shortness of breath, wheezing and stridor.    Cardiovascular: Negative for chest pain, palpitations and leg swelling.   Gastrointestinal: Negative for abdominal distention, abdominal pain, anal bleeding, blood in stool, constipation, diarrhea, nausea, rectal pain and vomiting.   Endocrine: Negative for cold intolerance, heat intolerance, polydipsia, polyphagia and polyuria.   Genitourinary: Negative for decreased urine volume, difficulty urinating, dysuria, enuresis, flank pain, frequency, genital sores, hematuria and urgency.   Musculoskeletal: Positive for arthralgias. Negative for back pain, gait problem, joint swelling, myalgias, neck pain and neck stiffness.   Skin: Negative for color change, pallor, rash and wound.   Allergic/Immunologic: Negative for environmental allergies, food allergies and immunocompromised state.   Neurological: Negative for dizziness, tremors, seizures, syncope, facial asymmetry, speech difficulty, weakness, light-headedness, numbness and headaches.   Hematological: Negative for adenopathy. Does not bruise/bleed easily.   Psychiatric/Behavioral: Negative for agitation, behavioral problems, confusion, decreased concentration, dysphoric mood, hallucinations, self-injury,  sleep disturbance and suicidal ideas. The patient is not nervous/anxious and is not hyperactive.         Objective      Physical Exam  There were no vitals taken for this visit.    There is no height or weight on file to calculate BMI.    General:   Mental Status:  Alert   Appearance: Cooperative, in no acute distress   Build and Nutrition: Overweight female   Orientation: Alert and oriented to person, place and time   Posture: Normal   Gait: Mild limp on the right    Integument:   Right knee: Wound is well-healed with no signs of infection    Lower Extremities:   Right Knee:    Tenderness:  None    Effusion:  None    Swelling: None    Crepitus:  None    Range of motion:  Extension: 0°       Flexion: 95°  Instability:  No varus laxity, no valgus laxity, negative anterior drawer  Deformities:  None      Imaging/Studies  Imaging Results (last 24 hours)     Procedure Component Value Units Date/Time    XR Knee 3+ View With [place] Right [[id number]] Resulted:  10/21/19 1125     Updated:  10/21/19 1125    Narrative:       Right Knee Radiographs  Indication: status-post right total knee arthroplasty  Views: AP, lateral, and sunrise views of the right knee    Comparison: no change compared to prior study, 9/26/2019    Findings:   The components are well aligned, with no signs of loosening or failure.            Assessment and Plan     Germaine was seen today for post-op.    Diagnoses and all orders for this visit:    Status post total right knee replacement  -     XR Knee 3+ View With [place] Right  -     Ambulatory Referral to Physical Therapy Evaluate and treat    Orthopedic aftercare        1. Status post total right knee replacement    2. Orthopedic aftercare        I reviewed my findings with patient today.  Her right total knee arthroplasty is functioning well, and she is pleased with results so far.  She will continue with therapy, and is transitioning to outpatient therapy.  I will see her back in 6 weeks, but sooner  for any problems.    Return in about 6 weeks (around 12/2/2019).      Medical Decision Making  Management Options : physical/occupational therapy  Data/Risk: radiology tests and independent visualization of imaging, lab tests, or EMG/NCV      Medardo Cosme MD  10/21/19  11:38 AM

## 2019-12-02 ENCOUNTER — OFFICE VISIT (OUTPATIENT)
Dept: ORTHOPEDIC SURGERY | Facility: CLINIC | Age: 64
End: 2019-12-02

## 2019-12-02 DIAGNOSIS — Z47.89 ORTHOPEDIC AFTERCARE: ICD-10-CM

## 2019-12-02 DIAGNOSIS — Z96.651 STATUS POST TOTAL RIGHT KNEE REPLACEMENT: Primary | ICD-10-CM

## 2019-12-02 PROCEDURE — 99024 POSTOP FOLLOW-UP VISIT: CPT | Performed by: ORTHOPAEDIC SURGERY

## 2019-12-02 NOTE — PROGRESS NOTES
[facility] [place] [facility] Note    Subjective     Chief Complaint   Patient presents with   • Post-op     6 week f/u; 9.5 weeks status post Right Total Knee Arthroplasty 09/26/2019        HPI    Germaine Jett is a 64 y.o. female.  She follows up today for her right total knee arthroplasty.  She is doing well today, with 100% relief compared to her preoperative symptoms.  Ambulatory without external aids.  Making progress with physical therapy.      Patient Active Problem List   Diagnosis   • Palpitations   • Hypertension   • Hyperlipidemia   • Obesity (BMI 30.0-34.9)   • Hypercholesterolemia   • Depression   • Knee osteoarthritis   • Hypothyroid   • Insomnia   • Primary osteoarthritis of right knee   • Status post total right knee replacement   • Leukocytosis, mild, likely reactive   • Acute blood loss anemia, mild, asymptomatic   • Acute postoperative pain     Past Medical History:   Diagnosis Date   • Acute pain of left knee    • Depression     Hx of   • Dyspnea    • Edema    • Hypercholesterolemia    • Hyperlipidemia    • Hypertension    • Hypothyroid      10/14 TSH 5.320;  04/15 TSH 4.580   • Insomnia    • Knee osteoarthritis     RT. meniscal scope, Dr. Alvino Cifuentes, 01/15. removal of ganglion cyst 01/15   • Obesity (BMI 30.0-34.9)     BMI 33.66   • Palpitations    • Wears glasses     READING      Past Surgical History:   Procedure Laterality Date   • APPENDECTOMY     • BLADDER SURGERY     • BREAST BIOPSY Left    • COLONOSCOPY  2017   • GANGLION CYST EXCISION  01/2015    R KNEE   • HYSTERECTOMY     • INCISIONAL BREAST BIOPSY     • KNEE ARTHROSCOPY Right    • TONSILLECTOMY AND ADENOIDECTOMY     • TOTAL KNEE ARTHROPLASTY Right 9/26/2019    Procedure: TOTAL KNEE ARTHROPLASTY RIGHT;  Surgeon: Medardo Cosme MD;  Location: [facility];  Service: Orthopedics      Family History   Problem Relation Age of Onset   • Heart attack Mother         Acute MI   • Hypertension Mother    • Stroke Mother    • Depression  Mother    • Heart disease Mother    • Osteoarthritis Mother    • Heart attack Father 63           • Heart disease Father    • Hypertension Father    • Depression Other    • Heart disease Other    • Hypertension Other      Social History     Socioeconomic History   • Marital status:      Spouse name: Not on file   • Number of children: Not on file   • Years of education: Not on file   • Highest education level: Not on file   Tobacco Use   • Smoking status: Never Smoker   • Smokeless tobacco: Never Used   Substance and Sexual Activity   • Alcohol use: No   • Drug use: No   • Sexual activity: Defer      Current Outpatient Medications on File Prior to Visit   Medication Sig Dispense Refill   • Acetaminophen (TYLENOL ARTHRITIS PAIN PO) Take  by mouth Take As Directed.     • aspirin 325 MG tablet Take 1 tablet by mouth Daily. 30 tablet 0   • aspirin 81 MG EC tablet Take 1 tablet by mouth Daily. Resume in 1 month     • buPROPion XL (WELLBUTRIN XL) 150 MG 24 hr tablet Take 150 mg by mouth Daily.  0   • docusate sodium (COLACE) 100 MG capsule Take 1 capsule by mouth 2 (Two) Times a Day As Needed for Constipation. 60 capsule 0   • estradiol (ESTRACE) 1 MG tablet Take 1 mg by mouth Daily.     • FLUoxetine (PROzac) 40 MG capsule Every Morning.  0   • losartan-hydrochlorothiazide (HYZAAR) 50-12.5 MG per tablet Take 1 tablet by mouth Daily.     • rosuvastatin (CRESTOR) 10 MG tablet Take 10 mg by mouth Every Night.  0   • traZODone (DESYREL) 50 MG tablet      • Turmeric 500 MG tablet Take 1 tablet by mouth Daily.     • venlafaxine XR (EFFEXOR-XR) 150 MG 24 hr capsule Take 150 mg by mouth Daily.  0     No current facility-administered medications on file prior to visit.       Allergies   Allergen Reactions   • Benadryl [Diphenhydramine] Other (See Comments)     SLEEPY   • Adhesive Tape Rash   • Percocet [Oxycodone-Acetaminophen] Other (See Comments)     SEVERE ITCHING IN HEAD        Review of Systems   Constitutional:  Negative for activity change, appetite change, chills, diaphoresis, fatigue, fever and unexpected weight change.   HENT: Negative for congestion, dental problem, drooling, ear discharge, ear pain, facial swelling, hearing loss, mouth sores, nosebleeds, postnasal drip, rhinorrhea, sinus pressure, sneezing, sore throat, tinnitus, trouble swallowing and voice change.    Eyes: Negative for photophobia, pain, discharge, redness, itching and visual disturbance.   Respiratory: Negative for apnea, cough, choking, chest tightness, shortness of breath, wheezing and stridor.    Cardiovascular: Positive for leg swelling. Negative for chest pain and palpitations.   Gastrointestinal: Negative for abdominal distention, abdominal pain, anal bleeding, blood in stool, constipation, diarrhea, nausea, rectal pain and vomiting.   Endocrine: Negative for cold intolerance, heat intolerance, polydipsia, polyphagia and polyuria.   Genitourinary: Negative for decreased urine volume, difficulty urinating, dysuria, enuresis, flank pain, frequency, genital sores, hematuria and urgency.   Musculoskeletal: Positive for arthralgias. Negative for back pain, gait problem, joint swelling, myalgias, neck pain and neck stiffness.   Skin: Negative for color change, pallor, rash and wound.   Allergic/Immunologic: Negative for environmental allergies, food allergies and immunocompromised state.   Neurological: Negative for dizziness, tremors, seizures, syncope, facial asymmetry, speech difficulty, weakness, light-headedness, numbness and headaches.   Hematological: Negative for adenopathy. Does not bruise/bleed easily.   Psychiatric/Behavioral: Negative for agitation, behavioral problems, confusion, decreased concentration, dysphoric mood, hallucinations, self-injury, sleep disturbance and suicidal ideas. The patient is not nervous/anxious and is not hyperactive.         Objective      Physical Exam  There were no vitals taken for this visit.    There is  no height or weight on file to calculate BMI.    General:   Mental Status:  Alert   Appearance: Cooperative, in no acute distress   Build and Nutrition: Overweight female   Orientation: Alert and oriented to person, place and time   Posture: Normal   Gait: Normal    Integument:   Right knee: Wound is well-healed with no signs of infection    Lower Extremities:   Right Knee:    Tenderness:  None    Effusion:  None    Swelling: None    Crepitus:  None    Range of motion:  Extension: 0°       Flexion: 115°  Instability:  No varus laxity, no valgus laxity, negative anterior drawer  Deformities:  None      Assessment and Plan     Germaine was seen today for post-op.    Diagnoses and all orders for this visit:    Status post total right knee replacement    Orthopedic aftercare        1. Status post total right knee replacement    2. Orthopedic aftercare        I reviewed my findings with the patient today.  Her right total knee arthroplasty is functioning well, and she is pleased with results.  I will see her back in 10 months, which will be a one-year checkup, but sooner for any problems.  X-rays of her knees on the next visit.    Return in about 10 months (around 10/2/2020) for Recheck with X-Rays.        Medardo Cosme MD  12/02/19  2:13 PM

## 2020-02-19 ENCOUNTER — OFFICE VISIT (OUTPATIENT)
Dept: PSYCHIATRY | Facility: CLINIC | Age: 65
End: 2020-02-19

## 2020-02-19 VITALS
SYSTOLIC BLOOD PRESSURE: 122 MMHG | DIASTOLIC BLOOD PRESSURE: 70 MMHG | HEART RATE: 88 BPM | BODY MASS INDEX: 34.16 KG/M2 | HEIGHT: 65 IN | WEIGHT: 205 LBS

## 2020-02-19 DIAGNOSIS — F41.1 GENERALIZED ANXIETY DISORDER: Primary | ICD-10-CM

## 2020-02-19 DIAGNOSIS — F33.1 MODERATE EPISODE OF RECURRENT MAJOR DEPRESSIVE DISORDER (HCC): ICD-10-CM

## 2020-02-19 PROCEDURE — 90792 PSYCH DIAG EVAL W/MED SRVCS: CPT | Performed by: NURSE PRACTITIONER

## 2020-02-19 RX ORDER — DULOXETIN HYDROCHLORIDE 30 MG/1
30 CAPSULE, DELAYED RELEASE ORAL DAILY
Qty: 30 CAPSULE | Refills: 0 | Status: SHIPPED | OUTPATIENT
Start: 2020-02-19 | End: 2020-03-18 | Stop reason: SDUPTHER

## 2020-02-19 RX ORDER — BUPROPION HYDROCHLORIDE 150 MG/1
150 TABLET ORAL DAILY
Qty: 30 TABLET | Refills: 2 | Status: SHIPPED | OUTPATIENT
Start: 2020-02-19 | End: 2020-03-18

## 2020-02-23 RX ORDER — LOSARTAN POTASSIUM 50 MG/1
TABLET ORAL
COMMUNITY
Start: 2020-01-29

## 2020-02-23 RX ORDER — HYDROCHLOROTHIAZIDE 12.5 MG/1
TABLET ORAL
COMMUNITY
Start: 2020-01-29

## 2020-02-23 NOTE — PROGRESS NOTES
Germaine Jett is a 64 y.o. female who is here today for initial appointment.     Chief Complaint:     ICD-10-CM ICD-9-CM   1. Generalized anxiety disorder F41.1 300.02   2. Moderate episode of recurrent major depressive disorder (CMS/HCC) F33.1 296.32       HPI: Pt presents for initial visit and medication management of mood symptoms. Pt is transferring care from [facility] [place] in [place]. Pt is currently prescribed Bupropion  mg. Pt decided to discontinued Effexor XR in December due to continued nightmares.Pt reports the following medication history: Effexor XR, Prozac, Zoloft, Bupropion XL and Citalopram. Pt reports current anxiety and depressive symptoms. States she has been easily irritated and impatient with others.       Pt reports the presence/absence of the following depressive symptoms: (+) depressed mood, (-) reduced appetite, (-) increased appetite, (+) poor concentration, (-) hopelessness, (-) worthlessness, (-) insomnia, (-) hypersomnia, (-) psychomotor agitation, (+) irritability, (-) anhedonia, (+) amotivation, (+) fatigue, (-) suicidal ideation, (-) suicidal plan, (-) suicidal intent, (-) recurrent thoughts about death, and (-) homicidal ideation.      Pt reports the presence/absence of the following anxiety symptoms: (+) excessive worry, (-) excessive fear, (+) difficulty relaxing, (+) restlessness, (-) insomnia, (+) easily fatigued, (+) irritability, (+) poor concentration, (-) racing thoughts, and (-) panic episodes.       Past Medical History:   Past Medical History:   Diagnosis Date   • Acute pain of left knee    • Depression     Hx of   • Dyspnea    • Edema    • Hypercholesterolemia    • Hyperlipidemia    • Hypertension    • Hypothyroid      10/14 TSH 5.320;  04/15 TSH 4.580   • Insomnia    • Knee osteoarthritis     RT. meniscal scope, Dr. Alvino Cifuentes, 01/15. removal of ganglion cyst 01/15   • Obesity (BMI 30.0-34.9)     BMI 33.66   • Palpitations    • Wears glasses     READING       Past  Surgical History:   Past Surgical History:   Procedure Laterality Date   • APPENDECTOMY     • BLADDER SURGERY     • BREAST BIOPSY Left    • COLONOSCOPY  2017   • GANGLION CYST EXCISION  01/2015    R KNEE   • HYSTERECTOMY     • INCISIONAL BREAST BIOPSY     • KNEE ARTHROSCOPY Right    • TONSILLECTOMY AND ADENOIDECTOMY     • TOTAL KNEE ARTHROPLASTY Right 9/26/2019    Procedure: TOTAL KNEE ARTHROPLASTY RIGHT;  Surgeon: Medardo Cosme MD;  Location: [facility];  Service: Orthopedics       Social History:   Social History     Socioeconomic History   • Marital status:      Spouse name: Not on file   • Number of children: 1   • Years of education: Not on file   • Highest education level: Master's degree (e.g., MA, MS, Misty, MEd, MSW, SIMON)   Tobacco Use   • Smoking status: Never Smoker   • Smokeless tobacco: Never Used   Substance and Sexual Activity   • Alcohol use: No   • Drug use: No   • Sexual activity: Defer       Allergy:  Allergies   Allergen Reactions   • Benadryl [Diphenhydramine] Other (See Comments)     SLEEPY   • Adhesive Tape Rash   • Percocet [Oxycodone-Acetaminophen] Other (See Comments)     SEVERE ITCHING IN HEAD       Current Medications:   Current Outpatient Medications   Medication Sig Dispense Refill   • aspirin 81 MG EC tablet Take 1 tablet by mouth Daily. Resume in 1 month     • buPROPion XL (WELLBUTRIN XL) 150 MG 24 hr tablet Take 1 tablet by mouth Daily. 30 tablet 2   • estradiol (ESTRACE) 1 MG tablet Take 1 mg by mouth Daily.     • losartan-hydrochlorothiazide (HYZAAR) 50-12.5 MG per tablet Take 1 tablet by mouth Daily.     • rosuvastatin (CRESTOR) 10 MG tablet Take 10 mg by mouth Every Night.  0   • DULoxetine (CYMBALTA) 30 MG capsule Take 1 capsule by mouth Daily. 30 capsule 0   • hydroCHLOROthiazide (HYDRODIURIL) 12.5 MG tablet      • losartan (COZAAR) 50 MG tablet        No current facility-administered medications for this visit.        Lab Results:   No visits with results within 3  Month(s) from this visit.   Latest known visit with results is:   Admission on 09/26/2019, Discharged on 09/27/2019   Component Date Value Ref Range Status   • Potassium 09/26/2019 3.7  3.5 - 5.2 mmol/L Final   • Glucose 09/26/2019 80  70 - 130 mg/dL Final   • WBC 09/27/2019 11.18* 3.40 - 10.80 10*3/mm3 Final   • RBC 09/27/2019 3.36* 3.77 - 5.28 10*6/mm3 Final   • Hemoglobin 09/27/2019 10.4* 12.0 - 15.9 g/dL Final   • Hematocrit 09/27/2019 33.1* 34.0 - 46.6 % Final   • MCV 09/27/2019 98.5* 79.0 - 97.0 fL Final   • MCH 09/27/2019 31.0  26.6 - 33.0 pg Final   • MCHC 09/27/2019 31.4* 31.5 - 35.7 g/dL Final   • RDW 09/27/2019 12.5  12.3 - 15.4 % Final   • RDW-SD 09/27/2019 44.9  37.0 - 54.0 fl Final   • MPV 09/27/2019 9.9  6.0 - 12.0 fL Final   • Platelets 09/27/2019 276  140 - 450 10*3/mm3 Final   • Glucose 09/27/2019 149* 65 - 99 mg/dL Final   • BUN 09/27/2019 13  8 - 23 mg/dL Final   • Creatinine 09/27/2019 0.96  0.57 - 1.00 mg/dL Final   • Sodium 09/27/2019 137  136 - 145 mmol/L Final   • Potassium 09/27/2019 3.8  3.5 - 5.2 mmol/L Final   • Chloride 09/27/2019 103  98 - 107 mmol/L Final   • CO2 09/27/2019 22.0  22.0 - 29.0 mmol/L Final   • Calcium 09/27/2019 8.9  8.6 - 10.5 mg/dL Final   • eGFR Non African Amer 09/27/2019 58* >60 mL/min/1.73 Final   • BUN/Creatinine Ratio 09/27/2019 13.5  7.0 - 25.0 Final   • Anion Gap 09/27/2019 12.0  5.0 - 15.0 mmol/L Final       Review of Symptoms:   Review of Systems   Constitutional: Negative.  Negative for activity change, appetite change, unexpected weight gain and unexpected weight loss.   Respiratory: Negative.    Cardiovascular: Negative.  Negative for chest pain.   Gastrointestinal: Negative.  Negative for diarrhea, nausea and vomiting.   Genitourinary: Negative.    Musculoskeletal: Positive for myalgias.   Skin: Negative for rash and bruise.   Neurological: Negative.  Negative for dizziness, seizures and speech difficulty.   Psychiatric/Behavioral: Positive for decreased  "concentration and dysphoric mood. Negative for agitation, behavioral problems, hallucinations, self-injury, sleep disturbance, suicidal ideas, negative for hyperactivity, depressed mood and stress. The patient is not nervous/anxious.        Physical Exam:   Physical Exam  Blood pressure 122/70, pulse 88, height 165.1 cm (65\"), weight 93 kg (205 lb), not currently breastfeeding.    Mental Status Exam:   Appearance: appropriate  Hygiene:   good  Cooperation:  Cooperative  Eye Contact:  Good  Psychomotor Behavior:  Appropriate  Mood:euthymic  Affect:  Appropriate  Hopelessness: Denies  Speech:  Normal  Thought Process:  Goal directed  Thought Content:  Normal  Suicidal:  None  Homicidal:  None  Hallucinations:  None  Delusion:  None  Memory:  Intact  Orientation:  Person, Place, Time and Situation  Reliability:  good  Insight:  Good  Judgement:  Good  Impulse Control:  Fair  Physical/Medical Issues:  No           Assessment/Plan   Diagnoses and all orders for this visit:    Generalized anxiety disorder  -     DULoxetine (CYMBALTA) 30 MG capsule; Take 1 capsule by mouth Daily.    Moderate episode of recurrent major depressive disorder (CMS/HCC)  -     buPROPion XL (WELLBUTRIN XL) 150 MG 24 hr tablet; Take 1 tablet by mouth Daily.    Treatment Plan        Problem: Depression/Anxiety      Intervention: The prescription and adjustment of medications and monitoring of side effects.  Add Cymbalta. Continue current dose of Bupropion XL.       Long term Goal: Overall improvement in mood and functioning per patient self -report      Short term Goal: Medication adherence. Improvement in symptoms per patient self-report.       A psychological evaluation was conducted in order to assess past and current level of functioning. Areas assessed included, but were not limited to: perception of social support, perception of ability to face and deal with challenges in life (positive functioning), anxiety symptoms, depressive symptoms, " perspective on beliefs/belief system, coping skills for stress, intelligence level,  Therapeutic rapport was established. Interventions conducted today were geared towards incorporating medication management along with support for continued therapy. Education was also provided as to the med management with this provider and what to expect in subsequent sessions.    We discussed risks, benefits,goals and side effects of the above medication and the patient was agreeable with the plan.Patient was educated on the importance of compliance with treatment and follow-up appointments. Patient is aware to contact the [facility] with any worsening of symptoms. To call for questions or concerns and return early if necessary. Patent is agreeable to go to the Emergency Department or call 911 should they begin SI/HI.     Return in about 4 weeks (around 3/18/2020) for Follow Up.    Shanice Dong, DNP, APRN, PMHNP-BC, FNP-C

## 2020-02-25 ENCOUNTER — TELEPHONE (OUTPATIENT)
Dept: PSYCHIATRY | Facility: CLINIC | Age: 65
End: 2020-02-25

## 2020-02-25 NOTE — TELEPHONE ENCOUNTER
JEFF WALKED IN CLINIC TO REPORT THAT SHE IS DOING MUCH BETTER WITH THE CYMBALTA IN REGARDS TO HER PHYSICAL PAIN AND FOCUS.  STATED THAT YOU AND SHE TALKED ABOUT ADDING SOMETHING TO HELP HER MOOD. SHE IS CURRENTLY TAKING WELLBUTRIN 150 MG AND ASKED IF YOU ADDED MED FOR MOOD, WOULD SHE HAVE TO WEAN OFF THE WELLBUTRIN? PLEASE ADVISE

## 2020-02-25 NOTE — TELEPHONE ENCOUNTER
If she has had some improvement then we will increase Cymbalta at next visit. We will continue current dose of Bupropion XL for now.

## 2020-03-18 ENCOUNTER — OFFICE VISIT (OUTPATIENT)
Dept: PSYCHIATRY | Facility: CLINIC | Age: 65
End: 2020-03-18

## 2020-03-18 VITALS — HEIGHT: 65 IN | BODY MASS INDEX: 34.49 KG/M2 | WEIGHT: 207 LBS

## 2020-03-18 DIAGNOSIS — F41.1 GENERALIZED ANXIETY DISORDER: ICD-10-CM

## 2020-03-18 DIAGNOSIS — F33.1 MODERATE EPISODE OF RECURRENT MAJOR DEPRESSIVE DISORDER (HCC): Primary | ICD-10-CM

## 2020-03-18 PROCEDURE — 99214 OFFICE O/P EST MOD 30 MIN: CPT | Performed by: NURSE PRACTITIONER

## 2020-03-18 RX ORDER — BUPROPION HYDROCHLORIDE 300 MG/1
300 TABLET ORAL DAILY
Qty: 30 TABLET | Refills: 1 | Status: SHIPPED | OUTPATIENT
Start: 2020-03-18 | End: 2020-05-20 | Stop reason: SDUPTHER

## 2020-03-18 RX ORDER — DULOXETIN HYDROCHLORIDE 30 MG/1
30 CAPSULE, DELAYED RELEASE ORAL DAILY
Qty: 30 CAPSULE | Refills: 1 | Status: SHIPPED | OUTPATIENT
Start: 2020-03-18 | End: 2020-04-02

## 2020-03-18 NOTE — PROGRESS NOTES
Germaine Jett is a 64 y.o. female is here today for medication management follow-up.    Chief Complaint:      ICD-10-CM ICD-9-CM   1. Moderate episode of recurrent major depressive disorder (CMS/[facility]) F33.1 296.32   2. Generalized anxiety disorder F41.1 300.02       History of Present Illness:  Pt presents for initial visit and medication management of mood symptoms. Pt is currently taking Cymbalta 30 mg daily and states the medication has helped to reduce her pain symptoms associated with arthritis but she continues to have depressive symptoms and amotivation.  Pt reports the following medication history: Effexor XR, Prozac, Zoloft, Bupropion XL and Citalopram     Pt reports the presence/absence of the following depressive symptoms: (+) depressed mood, (-) reduced appetite, (-) increased appetite, (+) poor concentration, (-) hopelessness, (-) worthlessness, (-) insomnia, (-) hypersomnia, (-) psychomotor agitation, (+) irritability, (-) anhedonia, (+) amotivation, (+) fatigue, (-) suicidal ideation, (-) suicidal plan, (-) suicidal intent, (-) recurrent thoughts about death, and (-) homicidal ideation.          The following portions of the patient's history were reviewed and updated as appropriate: allergies, current medications, past family history, past medical history, past social history, past surgical history and problem list.    Review of Systems;;  Review of Systems   Constitutional: Positive for fatigue. Negative for activity change, appetite change, unexpected weight gain and unexpected weight loss.   HENT: Negative.    Respiratory: Negative.    Cardiovascular: Negative for chest pain.   Gastrointestinal: Negative for diarrhea, nausea and vomiting.   Genitourinary: Negative.    Musculoskeletal: Negative.    Skin: Negative for rash and bruise.   Neurological: Negative.  Negative for dizziness, seizures and speech difficulty.   Psychiatric/Behavioral: Positive for dysphoric mood. Negative for agitation,  "behavioral problems, decreased concentration, hallucinations, self-injury, sleep disturbance, suicidal ideas, negative for hyperactivity, depressed mood and stress. The patient is not nervous/anxious.        Physical Exam;;  Physical Exam  Height 165.1 cm (65\"), weight 93.9 kg (207 lb), not currently breastfeeding.    Current Medications;;    Current Outpatient Medications:   •  aspirin 81 MG EC tablet, Take 1 tablet by mouth Daily. Resume in 1 month, Disp: , Rfl:   •  buPROPion XL (WELLBUTRIN XL) 300 MG 24 hr tablet, Take 1 tablet by mouth Daily., Disp: 30 tablet, Rfl: 1  •  DULoxetine (Cymbalta) 30 MG capsule, Take 1 capsule by mouth Daily., Disp: 30 capsule, Rfl: 1  •  estradiol (ESTRACE) 1 MG tablet, Take 1 mg by mouth Daily., Disp: , Rfl:   •  hydroCHLOROthiazide (HYDRODIURIL) 12.5 MG tablet, , Disp: , Rfl:   •  losartan (COZAAR) 50 MG tablet, , Disp: , Rfl:   •  losartan-hydrochlorothiazide (HYZAAR) 50-12.5 MG per tablet, Take 1 tablet by mouth Daily., Disp: , Rfl:   •  rosuvastatin (CRESTOR) 10 MG tablet, Take 10 mg by mouth Every Night., Disp: , Rfl: 0    Lab Results:       Mental Status Exam:   Hygiene:   good  Cooperation:  Cooperative  Eye Contact:  Good  Psychomotor Behavior:  Appropriate  Mood:euthymic  Affect:  Appropriate  Hopelessness: Denies  Speech:  Normal  Thought Process:  Goal directed  Thought Content:  Normal  Suicidal:  None  Homicidal:  None  Hallucinations:  None  Delusion:  None  Memory:  Intact  Orientation:  Person, Place, Time and Situation  Reliability:  good  Insight:  Good  Judgement:  Good  Impulse Control:  Good  Physical/Medical Issues:  No         Assessment Plan;;  Diagnoses and all orders for this visit:    Moderate episode of recurrent major depressive disorder (CMS/HCC)  -     buPROPion XL (WELLBUTRIN XL) 300 MG 24 hr tablet; Take 1 tablet by mouth Daily.  -     DULoxetine (Cymbalta) 30 MG capsule; Take 1 capsule by mouth Daily.    Generalized anxiety disorder  -     " DULoxetine (Cymbalta) 30 MG capsule; Take 1 capsule by mouth Daily.    Treatment Plan           Problem: Depression/Anxiety        Intervention: The prescription and adjustment of medications and monitoring of side effects.  Increase Bupropion XL. Continue current dose of Cymbalta. Pt instructed to call in two weeks to report response.         Long term Goal: Overall improvement in mood and functioning per patient self -report        Short term Goal: Medication adherence. Improvement in symptoms per patient self-report.          A psychological evaluation was conducted in order to assess past and current level of functioning. Areas assessed included, but were not limited to: perception of social support, perception of ability to face and deal with challenges in life (positive functioning), anxiety symptoms, depressive symptoms, perspective on beliefs/belief system, coping skills for stress, intelligence level,  Therapeutic rapport was established. Interventions conducted today were geared towards incorporating medication management along with support for continued therapy. Education was also provided as to the med management with this provider and what to expect in subsequent sessions.    We discussed risks, benefits,goals and side effects of the above medication and the patient was agreeable with the plan.Patient was educated on the importance of compliance with treatment and follow-up appointments. Patient is aware to contact the [facility] with any worsening of symptoms. To call for questions or concerns and return early if necessary. Patent is agreeable to go to the Emergency Department or call 911 should they begin SI/HI.     Return in about 2 months (around 5/18/2020) for Follow Up.    Shanice Dong, LC, APRN, PMHNP-BC, FNP-C

## 2020-04-01 ENCOUNTER — TELEPHONE (OUTPATIENT)
Dept: PSYCHIATRY | Facility: CLINIC | Age: 65
End: 2020-04-01

## 2020-04-01 NOTE — TELEPHONE ENCOUNTER
Germaine called in as requested, said the wellbutrin increase is working great. She asked if the Cymbalta could be increased? Please advise.

## 2020-04-02 RX ORDER — DULOXETIN HYDROCHLORIDE 60 MG/1
60 CAPSULE, DELAYED RELEASE ORAL DAILY
Qty: 30 CAPSULE | Refills: 1 | Status: SHIPPED | OUTPATIENT
Start: 2020-04-02 | End: 2020-05-20

## 2020-05-20 ENCOUNTER — OFFICE VISIT (OUTPATIENT)
Dept: PSYCHIATRY | Facility: CLINIC | Age: 65
End: 2020-05-20

## 2020-05-20 VITALS — WEIGHT: 207 LBS | BODY MASS INDEX: 34.49 KG/M2 | HEIGHT: 65 IN

## 2020-05-20 DIAGNOSIS — F41.1 GENERALIZED ANXIETY DISORDER: ICD-10-CM

## 2020-05-20 DIAGNOSIS — M19.90 ARTHRITIS: ICD-10-CM

## 2020-05-20 DIAGNOSIS — F33.1 MODERATE EPISODE OF RECURRENT MAJOR DEPRESSIVE DISORDER (HCC): Primary | ICD-10-CM

## 2020-05-20 PROCEDURE — 99214 OFFICE O/P EST MOD 30 MIN: CPT | Performed by: NURSE PRACTITIONER

## 2020-05-20 RX ORDER — BUPROPION HYDROCHLORIDE 300 MG/1
300 TABLET ORAL DAILY
Qty: 30 TABLET | Refills: 1 | Status: SHIPPED | OUTPATIENT
Start: 2020-05-20 | End: 2020-08-10 | Stop reason: SDUPTHER

## 2020-05-20 RX ORDER — DULOXETIN HYDROCHLORIDE 30 MG/1
30 CAPSULE, DELAYED RELEASE ORAL DAILY
Qty: 30 CAPSULE | Refills: 0 | Status: SHIPPED | OUTPATIENT
Start: 2020-05-20 | End: 2020-06-16 | Stop reason: SDUPTHER

## 2020-05-20 RX ORDER — FLUOXETINE HYDROCHLORIDE 20 MG/1
20 CAPSULE ORAL DAILY
Qty: 30 CAPSULE | Refills: 0 | Status: SHIPPED | OUTPATIENT
Start: 2020-05-20 | End: 2020-06-16 | Stop reason: SDUPTHER

## 2020-05-20 NOTE — PROGRESS NOTES
"Germaine Jett is a 65 y.o. female is here today for medication management follow-up.    Chief Complaint:      ICD-10-CM ICD-9-CM   1. Moderate episode of recurrent major depressive disorder (CMS/[facility]) F33.1 296.32   2. Generalized anxiety disorder F41.1 300.02       History of Present Illness:  Pt presents for initial visit and medication management of mood symptoms. Pt is currently taking Cymbalta 60 mg daily and states the medication has helped to reduce her pain symptoms associated with arthritis but she continues to have depressive symptoms and amotivation.  Pt states that she has no energy, procrastinates, has a loss of interest, and she has to \"make herself do things\". Pt reports the following medication history: Effexor XR, Prozac, Zoloft, Bupropion XL and Citalopram     Pt reports the presence/absence of the following depressive symptoms: (+) depressed mood, (-) reduced appetite, (-) increased appetite, (+) poor concentration, (-) hopelessness, (-) worthlessness, (-) insomnia, (-) hypersomnia, (-) psychomotor agitation, (+) irritability, (+) anhedonia, (+) amotivation, (+) fatigue, (-) suicidal ideation, (-) suicidal plan, (-) suicidal intent, (-) recurrent thoughts about death, and (-) homicidal ideation.          The following portions of the patient's history were reviewed and updated as appropriate: allergies, current medications, past family history, past medical history, past social history, past surgical history and problem list.    Review of Systems;;  Review of Systems   Constitutional: Positive for fatigue. Negative for activity change, appetite change, unexpected weight gain and unexpected weight loss.   HENT: Negative.    Respiratory: Negative.    Cardiovascular: Negative for chest pain.   Gastrointestinal: Negative for diarrhea, nausea and vomiting.   Genitourinary: Negative.    Musculoskeletal: Negative.    Skin: Negative for rash and bruise.   Neurological: Negative.  Negative for dizziness, " "seizures and speech difficulty.   Psychiatric/Behavioral: Positive for dysphoric mood. Negative for agitation, behavioral problems, decreased concentration, hallucinations, self-injury, sleep disturbance, suicidal ideas, negative for hyperactivity, depressed mood and stress. The patient is not nervous/anxious.        Physical Exam;;  Physical Exam  Height 165.1 cm (65\"), weight 93.9 kg (207 lb), not currently breastfeeding.    Current Medications;;    Current Outpatient Medications:   •  aspirin 81 MG EC tablet, Take 1 tablet by mouth Daily. Resume in 1 month, Disp: , Rfl:   •  buPROPion XL (WELLBUTRIN XL) 300 MG 24 hr tablet, Take 1 tablet by mouth Daily., Disp: 30 tablet, Rfl: 1  •  DULoxetine (Cymbalta) 30 MG capsule, Take 1 capsule by mouth Daily., Disp: 30 capsule, Rfl: 0  •  estradiol (ESTRACE) 1 MG tablet, Take 1 mg by mouth Daily., Disp: , Rfl:   •  FLUoxetine (PROzac) 20 MG capsule, Take 1 capsule by mouth Daily., Disp: 30 capsule, Rfl: 0  •  hydroCHLOROthiazide (HYDRODIURIL) 12.5 MG tablet, , Disp: , Rfl:   •  losartan (COZAAR) 50 MG tablet, , Disp: , Rfl:   •  losartan-hydrochlorothiazide (HYZAAR) 50-12.5 MG per tablet, Take 1 tablet by mouth Daily., Disp: , Rfl:   •  rosuvastatin (CRESTOR) 10 MG tablet, Take 10 mg by mouth Every Night., Disp: , Rfl: 0    Lab Results:       Mental Status Exam:   Hygiene:   good  Cooperation:  Cooperative  Eye Contact:  Good  Psychomotor Behavior:  Appropriate  Mood:euthymic  Affect:  Appropriate  Hopelessness: Denies  Speech:  Normal  Thought Process:  Goal directed  Thought Content:  Normal  Suicidal:  None  Homicidal:  None  Hallucinations:  None  Delusion:  None  Memory:  Intact  Orientation:  Person, Place, Time and Situation  Reliability:  good  Insight:  Good  Judgement:  Good  Impulse Control:  Good  Physical/Medical Issues:  No         Assessment Plan;;  Diagnoses and all orders for this visit:    Moderate episode of recurrent major depressive disorder " (CMS/[facility])  -     buPROPion XL (WELLBUTRIN XL) 300 MG 24 hr tablet; Take 1 tablet by mouth Daily.    Generalized anxiety disorder    Other orders  -     DULoxetine (Cymbalta) 30 MG capsule; Take 1 capsule by mouth Daily.  -     FLUoxetine (PROzac) 20 MG capsule; Take 1 capsule by mouth Daily.    Treatment Plan           Problem: Depression/Anxiety        Intervention: The prescription and adjustment of medications and monitoring of side effects.  Add Prozac. Decrease current dose of Cymbalta to 30 mg.  Continue current dose of Bupropion XL.       Long term Goal: Overall improvement in mood and functioning per patient self -report        Short term Goal: Medication adherence. Improvement in symptoms per patient self-report.          A psychological evaluation was conducted in order to assess past and current level of functioning. Areas assessed included, but were not limited to: perception of social support, perception of ability to face and deal with challenges in life (positive functioning), anxiety symptoms, depressive symptoms, perspective on beliefs/belief system, coping skills for stress, intelligence level,  Therapeutic rapport was established. Interventions conducted today were geared towards incorporating medication management along with support for continued therapy. Education was also provided as to the med management with this provider and what to expect in subsequent sessions.    We discussed risks, benefits,goals and side effects of the above medication and the patient was agreeable with the plan.Patient was educated on the importance of compliance with treatment and follow-up appointments. Patient is aware to contact the [facility] with any worsening of symptoms. To call for questions or concerns and return early if necessary. Patent is agreeable to go to the Emergency Department or call 911 should they begin SI/HI.     Return in about 4 weeks (around 6/17/2020) for Follow Up.    Shanice Dong,  DNP, APRN, PMHNP-BC, FNP-C

## 2020-06-12 ENCOUNTER — TRANSCRIBE ORDERS (OUTPATIENT)
Dept: ADMINISTRATIVE | Facility: HOSPITAL | Age: 65
End: 2020-06-12

## 2020-06-12 DIAGNOSIS — Z12.31 VISIT FOR SCREENING MAMMOGRAM: Primary | ICD-10-CM

## 2020-06-16 DIAGNOSIS — F33.1 MODERATE EPISODE OF RECURRENT MAJOR DEPRESSIVE DISORDER (HCC): ICD-10-CM

## 2020-06-16 DIAGNOSIS — M19.90 ARTHRITIS: ICD-10-CM

## 2020-06-16 RX ORDER — DULOXETIN HYDROCHLORIDE 30 MG/1
30 CAPSULE, DELAYED RELEASE ORAL DAILY
Qty: 30 CAPSULE | Refills: 2 | Status: SHIPPED | OUTPATIENT
Start: 2020-06-16 | End: 2020-08-10 | Stop reason: SDUPTHER

## 2020-06-16 RX ORDER — FLUOXETINE HYDROCHLORIDE 20 MG/1
20 CAPSULE ORAL DAILY
Qty: 30 CAPSULE | Refills: 2 | Status: SHIPPED | OUTPATIENT
Start: 2020-06-16 | End: 2020-08-10 | Stop reason: SDUPTHER

## 2020-06-24 ENCOUNTER — OFFICE VISIT (OUTPATIENT)
Dept: ORTHOPEDIC SURGERY | Facility: CLINIC | Age: 65
End: 2020-06-24

## 2020-06-24 VITALS — OXYGEN SATURATION: 98 % | HEIGHT: 65 IN | WEIGHT: 210 LBS | HEART RATE: 74 BPM | BODY MASS INDEX: 34.99 KG/M2

## 2020-06-24 DIAGNOSIS — M75.41 IMPINGEMENT SYNDROME OF RIGHT SHOULDER: Primary | ICD-10-CM

## 2020-06-24 PROCEDURE — 99214 OFFICE O/P EST MOD 30 MIN: CPT | Performed by: ORTHOPAEDIC SURGERY

## 2020-06-24 PROCEDURE — 20610 DRAIN/INJ JOINT/BURSA W/O US: CPT | Performed by: ORTHOPAEDIC SURGERY

## 2020-06-24 RX ORDER — TRIAMCINOLONE ACETONIDE 40 MG/ML
40 INJECTION, SUSPENSION INTRA-ARTICULAR; INTRAMUSCULAR
Status: COMPLETED | OUTPATIENT
Start: 2020-06-24 | End: 2020-06-24

## 2020-06-24 RX ORDER — ROPIVACAINE HYDROCHLORIDE 5 MG/ML
4 INJECTION, SOLUTION EPIDURAL; INFILTRATION; PERINEURAL
Status: COMPLETED | OUTPATIENT
Start: 2020-06-24 | End: 2020-06-24

## 2020-06-24 RX ADMIN — TRIAMCINOLONE ACETONIDE 40 MG: 40 INJECTION, SUSPENSION INTRA-ARTICULAR; INTRAMUSCULAR at 10:07

## 2020-06-24 RX ADMIN — ROPIVACAINE HYDROCHLORIDE 4 ML: 5 INJECTION, SOLUTION EPIDURAL; INFILTRATION; PERINEURAL at 10:07

## 2020-06-24 NOTE — PROGRESS NOTES
[facility] [place]    Subjective     Chief Complaint   Patient presents with   • Right Shoulder - Pain        HPI    Germaine Jett is a 65 y.o. female who presents with right shoulder pain.  Onset: atraumatic and gradual in nature. The issue has been ongoing for 4 month(s). Pain is a 5/10 on the pain scale. Pain is described as stabbing. Associated symptoms include pain. The pain is worse with raising arm up above shoulder level; resting and pain medication and/or NSAID improve the pain. Previous treatments have included: NSAIDS.  No previous injections.  Pain hurts with abduction of the shoulder.  No pain at night.    I have reviewed the following portions of the patient's history:History of Present Illness      Patient Active Problem List   Diagnosis   • Palpitations   • Hypertension   • Hyperlipidemia   • Obesity (BMI 30.0-34.9)   • Hypercholesterolemia   • Depression   • Knee osteoarthritis   • Hypothyroid   • Insomnia   • Primary osteoarthritis of right knee   • Status post total right knee replacement   • Leukocytosis, mild, likely reactive   • Acute blood loss anemia, mild, asymptomatic   • Acute postoperative pain     Past Medical History:   Diagnosis Date   • Acute pain of left knee    • Depression     Hx of   • Dyspnea    • Edema    • Hypercholesterolemia    • Hyperlipidemia    • Hypertension    • Hypothyroid      10/14 TSH 5.320;  04/15 TSH 4.580   • Insomnia    • Knee osteoarthritis     RT. meniscal scope, Dr. Alvino Cifuentes, 01/15. removal of ganglion cyst 01/15   • Obesity (BMI 30.0-34.9)     BMI 33.66   • Palpitations    • Wears glasses     READING      Past Surgical History:   Procedure Laterality Date   • APPENDECTOMY     • BLADDER SURGERY     • BREAST BIOPSY Left    • COLONOSCOPY  2017   • GANGLION CYST EXCISION  01/2015    R KNEE   • HYSTERECTOMY     • INCISIONAL BREAST BIOPSY     • KNEE ARTHROSCOPY Right    • TONSILLECTOMY AND ADENOIDECTOMY     • TOTAL KNEE ARTHROPLASTY Right  2019    Procedure: TOTAL KNEE ARTHROPLASTY RIGHT;  Surgeon: Medardo Cosme MD;  Location: [facility];  Service: Orthopedics      Family History   Problem Relation Age of Onset   • Heart attack Mother         Acute MI   • Hypertension Mother    • Stroke Mother    • Depression Mother    • Heart disease Mother    • Osteoarthritis Mother    • Heart attack Father 63           • Heart disease Father    • Hypertension Father    • Depression Other    • Heart disease Other    • Hypertension Other    • Anxiety disorder Daughter    • Depression Daughter      Social History     Socioeconomic History   • Marital status:      Spouse name: Not on file   • Number of children: 1   • Years of education: Not on file   • Highest education level: Master's degree (e.g., MA, MS, Misty, MEd, MSW, SIMON)   Tobacco Use   • Smoking status: Never Smoker   • Smokeless tobacco: Never Used   Substance and Sexual Activity   • Alcohol use: No   • Drug use: No   • Sexual activity: Defer      Current Outpatient Medications on File Prior to Visit   Medication Sig Dispense Refill   • aspirin 81 MG EC tablet Take 1 tablet by mouth Daily. Resume in 1 month     • buPROPion XL (WELLBUTRIN XL) 300 MG 24 hr tablet Take 1 tablet by mouth Daily. 30 tablet 1   • DULoxetine (Cymbalta) 30 MG capsule Take 1 capsule by mouth Daily. 30 capsule 2   • estradiol (ESTRACE) 1 MG tablet Take 1 mg by mouth Daily.     • FLUoxetine (PROzac) 20 MG capsule Take 1 capsule by mouth Daily. 30 capsule 2   • hydroCHLOROthiazide (HYDRODIURIL) 12.5 MG tablet      • losartan (COZAAR) 50 MG tablet      • losartan-hydrochlorothiazide (HYZAAR) 50-12.5 MG per tablet Take 1 tablet by mouth Daily.     • rosuvastatin (CRESTOR) 10 MG tablet Take 10 mg by mouth Every Night.  0     No current facility-administered medications on file prior to visit.       Allergies   Allergen Reactions   • Benadryl [Diphenhydramine] Other (See Comments)     SLEEPY   • Adhesive Tape Rash   •  Percocet [Oxycodone-Acetaminophen] Other (See Comments)     SEVERE ITCHING IN HEAD        Review of Systems   Constitutional: Negative for activity change, appetite change, chills, diaphoresis, fatigue, fever and unexpected weight change.   HENT: Negative for congestion, dental problem, drooling, ear discharge, ear pain, facial swelling, hearing loss, mouth sores, nosebleeds, postnasal drip, rhinorrhea, sinus pressure, sneezing, sore throat, tinnitus, trouble swallowing and voice change.    Eyes: Negative for photophobia, pain, discharge, redness, itching and visual disturbance.   Respiratory: Negative for apnea, cough, choking, chest tightness, shortness of breath, wheezing and stridor.    Cardiovascular: Negative for chest pain, palpitations and leg swelling.   Gastrointestinal: Negative for abdominal distention, abdominal pain, anal bleeding, blood in stool, constipation, diarrhea, nausea, rectal pain and vomiting.   Endocrine: Negative for cold intolerance, heat intolerance, polydipsia, polyphagia and polyuria.   Genitourinary: Negative for decreased urine volume, difficulty urinating, dysuria, enuresis, flank pain, frequency, genital sores, hematuria and urgency.   Musculoskeletal: Positive for arthralgias. Negative for back pain, gait problem, joint swelling, myalgias, neck pain and neck stiffness.   Skin: Negative for color change, pallor, rash and wound.   Allergic/Immunologic: Negative for environmental allergies, food allergies and immunocompromised state.   Neurological: Negative for dizziness, tremors, seizures, syncope, facial asymmetry, speech difficulty, weakness, light-headedness, numbness and headaches.   Hematological: Negative for adenopathy. Does not bruise/bleed easily.   Psychiatric/Behavioral: Negative for agitation, behavioral problems, confusion, decreased concentration, dysphoric mood, hallucinations, self-injury, sleep disturbance and suicidal ideas. The patient is not nervous/anxious and  "is not hyperactive.         Objective      Physical Exam  Pulse 74   Ht 165.1 cm (65\")   Wt 95.3 kg (210 lb)   SpO2 98%   BMI 34.95 kg/m²     Body mass index is 34.95 kg/m².    General:   Mental Status:  Alert   Appearance: Cooperative, in no acute distress   Build and Nutrition: Overweight female   Orientation: Alert and oriented to person, place and time   Posture: Normal   Gait: Normal    Integument:   Right shoulder: No skin lesions, no rash, no ecchymosis    Neurologic:   Sensation:    Right hand: Intact to light touch in the digits   Motor:  Right upper extremity: 5/5 deltoid, biceps, triceps, wrist flexors, wrist extensors, and interossei    Vascular:   Right upper extremity: 2+ radial pulse, prompt capillary refill    Upper Extremities:   Right Shoulder:    Tenderness:  None    Swelling:  None    Crepitus: None    Atrophy:  None    Range of motion:  External rotation:  60°       Forward flexion:  180°       Abduction:   170°  Instability:  None  Deformities:  None  Functional testing: Negative drop arm, negative lift-off, mildly positive impingement      Imaging/Studies  Imaging Results (Last 24 Hours)     Procedure Component Value Units Date/Time    XR Shoulder 2+ View Right [[id number]] Resulted:  06/24/20 0958     Updated:  06/24/20 0959    Narrative:       Right Shoulder Radiographs  Indication: right shoulder pain  Views: AP, outlet and axillary views of the right shoulder    Comparison: no prior studies available for review    Findings:  Good alignment, sclerosis of the greater tuberosity, no unusual bony   features.              Assessment and Plan     Germaine was seen today for pain.    Diagnoses and all orders for this visit:    Impingement syndrome of right shoulder  -     XR Shoulder 2+ View Right  -     Large Joint Arthrocentesis        1. Impingement syndrome of right shoulder        I reviewed my findings with the patient today.  She has shoulder impingement, and I offered her a subacromial " injection and she will do exercises on her own (I provided an exercise sheet today).  I will see her back in 2 months, but sooner for any problems.  If she has persistent pain, I would consider an MRI.    Of note, she had 70% improvement just a few minutes following the injection today.    Return in about 2 months (around 8/24/2020).    Medical Decision Making  Management Options : prescription/IM medicine  Data/Risk: radiology tests and independent visualization of imaging, lab tests, or EMG/NCV      Medardo Cosme MD  06/24/20  10:18    Dragon disclaimer:  Much of this encounter note is an electronic transcription/translation of spoken language to printed text. The electronic translation of spoken language may permit erroneous, or at times, nonsensical words or phrases to be inadvertently transcribed; Although I have reviewed the note for such errors, some may still exist.

## 2020-06-24 NOTE — PROGRESS NOTES
Procedure   Large Joint Arthrocentesis  Date/Time: 6/24/2020 10:07 AM  Consent given by: patient  Site marked: site marked  Timeout: Immediately prior to procedure a time out was called to verify the correct patient, procedure, equipment, support staff and site/side marked as required   Supporting Documentation  Indications: pain   Procedure Details  Location: shoulder -   Preparation: Patient was prepped and draped in the usual sterile fashion  Needle size: 22 G  Approach: anterolateral  Medications administered: 40 mg triamcinolone acetonide 40 MG/ML; 4 mL ropivacaine 0.5 %  Patient tolerance: patient tolerated the procedure well with no immediate complications

## 2020-08-10 DIAGNOSIS — F33.1 MODERATE EPISODE OF RECURRENT MAJOR DEPRESSIVE DISORDER (HCC): ICD-10-CM

## 2020-08-10 DIAGNOSIS — M19.90 ARTHRITIS: ICD-10-CM

## 2020-08-10 RX ORDER — FLUOXETINE HYDROCHLORIDE 20 MG/1
20 CAPSULE ORAL DAILY
Qty: 30 CAPSULE | Refills: 2 | Status: SHIPPED | OUTPATIENT
Start: 2020-08-10 | End: 2020-12-30 | Stop reason: SDUPTHER

## 2020-08-10 RX ORDER — BUPROPION HYDROCHLORIDE 300 MG/1
300 TABLET ORAL DAILY
Qty: 30 TABLET | Refills: 1 | Status: SHIPPED | OUTPATIENT
Start: 2020-08-10 | End: 2020-12-30 | Stop reason: SDUPTHER

## 2020-08-10 RX ORDER — DULOXETIN HYDROCHLORIDE 30 MG/1
30 CAPSULE, DELAYED RELEASE ORAL DAILY
Qty: 30 CAPSULE | Refills: 2 | Status: SHIPPED | OUTPATIENT
Start: 2020-08-10 | End: 2020-12-30

## 2020-09-29 ENCOUNTER — HOSPITAL ENCOUNTER (OUTPATIENT)
Dept: MAMMOGRAPHY | Facility: HOSPITAL | Age: 65
Discharge: HOME | End: 2020-09-29
Admitting: OBSTETRICS & GYNECOLOGY

## 2020-09-29 DIAGNOSIS — Z12.31 VISIT FOR SCREENING MAMMOGRAM: ICD-10-CM

## 2020-09-29 PROCEDURE — 77063 BREAST TOMOSYNTHESIS BI: CPT

## 2020-09-29 PROCEDURE — 77067 SCR MAMMO BI INCL CAD: CPT

## 2020-09-30 ENCOUNTER — OFFICE VISIT (OUTPATIENT)
Dept: ORTHOPEDIC SURGERY | Facility: CLINIC | Age: 65
End: 2020-09-30

## 2020-09-30 VITALS — HEART RATE: 75 BPM | HEIGHT: 65 IN | WEIGHT: 210.8 LBS | BODY MASS INDEX: 35.12 KG/M2 | OXYGEN SATURATION: 95 %

## 2020-09-30 DIAGNOSIS — M75.41 IMPINGEMENT SYNDROME OF RIGHT SHOULDER: Primary | ICD-10-CM

## 2020-09-30 PROCEDURE — 20610 DRAIN/INJ JOINT/BURSA W/O US: CPT | Performed by: ORTHOPAEDIC SURGERY

## 2020-09-30 RX ORDER — TRIAMCINOLONE ACETONIDE 40 MG/ML
40 INJECTION, SUSPENSION INTRA-ARTICULAR; INTRAMUSCULAR
Status: COMPLETED | OUTPATIENT
Start: 2020-09-30 | End: 2020-09-30

## 2020-09-30 RX ORDER — ROPIVACAINE HYDROCHLORIDE 5 MG/ML
4 INJECTION, SOLUTION EPIDURAL; INFILTRATION; PERINEURAL
Status: COMPLETED | OUTPATIENT
Start: 2020-09-30 | End: 2020-09-30

## 2020-09-30 RX ADMIN — TRIAMCINOLONE ACETONIDE 40 MG: 40 INJECTION, SUSPENSION INTRA-ARTICULAR; INTRAMUSCULAR at 10:12

## 2020-09-30 RX ADMIN — ROPIVACAINE HYDROCHLORIDE 4 ML: 5 INJECTION, SOLUTION EPIDURAL; INFILTRATION; PERINEURAL at 10:12

## 2020-09-30 NOTE — PROGRESS NOTES
[facility] [place] [facility] [place]    Subjective     Chief Complaint   Patient presents with   • Follow-up     3 month follow up - Impingement syndrome of right shoulder        HPI    It has been 3  month(s) since Ms. Jett's last visit. She returns to clinic today for follow-up of right shoulder pain. She rates her pain a 1/10 on the pain scale. Previous/current treatments: steroid injection (last injection 06/24/2020). Current symptoms: pain. The pain is worse with any pressure on the joint; pain medication and/or NSAID improve the pain. Overall, she is doing better.  She would like to have another injection today.    I have reviewed the following portions of the patient's history and agree with: History of Present Illness and Review of Systems    Patient Active Problem List   Diagnosis   • Palpitations   • Hypertension   • Hyperlipidemia   • Obesity (BMI 30.0-34.9)   • Hypercholesterolemia   • Depression   • Knee osteoarthritis   • Hypothyroid   • Insomnia   • Primary osteoarthritis of right knee   • Status post total right knee replacement   • Leukocytosis, mild, likely reactive   • Acute blood loss anemia, mild, asymptomatic   • Acute postoperative pain     Past Medical History:   Diagnosis Date   • Acute pain of left knee    • Depression     Hx of   • Dyspnea    • Edema    • Hypercholesterolemia    • Hyperlipidemia    • Hypertension    • Hypothyroid      10/14 TSH 5.320;  04/15 TSH 4.580   • Insomnia    • Knee osteoarthritis     RT. meniscal scope, Dr. Alvino Cifuentes, 01/15. removal of ganglion cyst 01/15   • Obesity (BMI 30.0-34.9)     BMI 33.66   • Palpitations    • Wears glasses     READING      Past Surgical History:   Procedure Laterality Date   • APPENDECTOMY     • BLADDER SURGERY     • BREAST BIOPSY Left    • COLONOSCOPY  2017   • GANGLION CYST EXCISION  01/2015    R KNEE   • HYSTERECTOMY     • INCISIONAL BREAST BIOPSY     • KNEE ARTHROSCOPY Right    • TONSILLECTOMY AND ADENOIDECTOMY     • TOTAL  KNEE ARTHROPLASTY Right 2019    Procedure: TOTAL KNEE ARTHROPLASTY RIGHT;  Surgeon: Medardo Cosme MD;  Location: [facility];  Service: Orthopedics      Family History   Problem Relation Age of Onset   • Heart attack Mother         Acute MI   • Hypertension Mother    • Stroke Mother    • Depression Mother    • Heart disease Mother    • Osteoarthritis Mother    • Heart attack Father 63           • Heart disease Father    • Hypertension Father    • Depression Other    • Heart disease Other    • Hypertension Other    • Anxiety disorder Daughter    • Depression Daughter      Social History     Socioeconomic History   • Marital status:      Spouse name: Not on file   • Number of children: 1   • Years of education: Not on file   • Highest education level: Master's degree (e.g., MA, MS, Misty, MEd, MSW, SIMON)   Tobacco Use   • Smoking status: Never Smoker   • Smokeless tobacco: Never Used   Substance and Sexual Activity   • Alcohol use: No   • Drug use: No   • Sexual activity: Defer      Current Outpatient Medications on File Prior to Visit   Medication Sig Dispense Refill   • aspirin 81 MG EC tablet Take 1 tablet by mouth Daily. Resume in 1 month     • buPROPion XL (WELLBUTRIN XL) 300 MG 24 hr tablet Take 1 tablet by mouth Daily. 30 tablet 1   • DULoxetine (Cymbalta) 30 MG capsule Take 1 capsule by mouth Daily. 30 capsule 2   • estradiol (ESTRACE) 1 MG tablet Take 1 mg by mouth Daily.     • FLUoxetine (PROzac) 20 MG capsule Take 1 capsule by mouth Daily. 30 capsule 2   • hydroCHLOROthiazide (HYDRODIURIL) 12.5 MG tablet      • losartan (COZAAR) 50 MG tablet      • losartan-hydrochlorothiazide (HYZAAR) 50-12.5 MG per tablet Take 1 tablet by mouth Daily.     • rosuvastatin (CRESTOR) 10 MG tablet Take 10 mg by mouth Every Night.  0     No current facility-administered medications on file prior to visit.       Allergies   Allergen Reactions   • Benadryl [Diphenhydramine] Other (See Comments)     SLEEPY   •  Adhesive Tape Rash   • Percocet [Oxycodone-Acetaminophen] Other (See Comments)     SEVERE ITCHING IN HEAD        Review of Systems   Constitutional: Negative for activity change, appetite change, chills, diaphoresis, fatigue, fever and unexpected weight change.   HENT: Negative for congestion, dental problem, drooling, ear discharge, ear pain, facial swelling, hearing loss, mouth sores, nosebleeds, postnasal drip, rhinorrhea, sinus pressure, sneezing, sore throat, tinnitus, trouble swallowing and voice change.    Eyes: Negative for photophobia, pain, discharge, redness, itching and visual disturbance.   Respiratory: Negative for apnea, cough, choking, chest tightness, shortness of breath, wheezing and stridor.    Cardiovascular: Negative for chest pain, palpitations and leg swelling.   Gastrointestinal: Negative for abdominal distention, abdominal pain, anal bleeding, blood in stool, constipation, diarrhea, nausea, rectal pain and vomiting.   Endocrine: Negative for cold intolerance, heat intolerance, polydipsia, polyphagia and polyuria.   Genitourinary: Negative for decreased urine volume, difficulty urinating, dysuria, enuresis, flank pain, frequency, genital sores, hematuria and urgency.   Musculoskeletal: Positive for arthralgias. Negative for back pain, gait problem, joint swelling, myalgias, neck pain and neck stiffness.   Skin: Negative for color change, pallor, rash and wound.   Allergic/Immunologic: Negative for environmental allergies, food allergies and immunocompromised state.   Neurological: Negative for dizziness, tremors, seizures, syncope, facial asymmetry, speech difficulty, weakness, light-headedness, numbness and headaches.   Hematological: Negative for adenopathy. Does not bruise/bleed easily.   Psychiatric/Behavioral: Negative for agitation, behavioral problems, confusion, decreased concentration, dysphoric mood, hallucinations, self-injury, sleep disturbance and suicidal ideas. The patient is  "not nervous/anxious and is not hyperactive.         Objective      Physical Exam  Pulse 75   Ht 165.1 cm (65\")   Wt 95.6 kg (210 lb 12.8 oz)   SpO2 95%   BMI 35.08 kg/m²     Body mass index is 35.08 kg/m².    General:   Mental Status:  Alert   Appearance: Cooperative, in no acute distress   Build and Nutrition: Overweight female   Orientation: Alert and oriented to person, place and time   Posture: Normal   Gait: Normal    Upper Extremities:              Right Shoulder:                          Tenderness:    None                          Swelling:          None                          Crepitus:          None                          Atrophy:           None                          Range of motion:        External rotation:         60°                                                              Forward flexion:          180°                                                              Abduction:                   170°  Instability:        None  Deformities:     None  Functional testing: Negative drop arm, negative lift-off, mildly positive impingement      Assessment and Plan     Germaine was seen today for follow-up.    Diagnoses and all orders for this visit:    Impingement syndrome of right shoulder  -     Large Joint Arthrocentesis: R subacromial bursa        1. Impingement syndrome of right shoulder        Reviewed my findings with the patient today.  Shoulder is improved from her last visit, she has mild residual pain.  She would like to have another injection, this is provided.  I will see her back for any worsening or problems in the future.    Of note, she does have a follow-up for her total knee arthroplasty in October.    Procedure Note:  The potential benefits of performing a therapeutic right shoulder glenohumeral joint injection, as well as potential risks (including, but not limited to infection, swelling, pain, bleeding, bruising, nerve/blood vessel damage, skin color changes, transient elevation " in blood glucose levels, and fat atrophy) were discussed with the patient.  After informed consent, timeout procedure was performed, and the skin on the right shoulder was prepped with chlorhexidine soap and alcohol, after which ethyl chloride was applied to the skin at the injection site. Via the posterior approach, 1ml of Kenalog 40mg/ml mixed with 4ml 0.5% ropivacaine plain was injected into the shoulder joint.  The patient tolerated the procedure well, experiencing 50% improvement a few minutes following the injection. There were no complications.  Band-Aid was applied to the injection site. Post-procedural instructions were given to the patient and/or their caregiver.      Return if symptoms worsen or fail to improve.    Medical Decision Making  Management Options : prescription/IM medicine      Medardo Cosme MD  09/30/20  10:36 EDT    Dragon disclaimer:  Much of this encounter note is an electronic transcription/translation of spoken language to printed text. The electronic translation of spoken language may permit erroneous, or at times, nonsensical words or phrases to be inadvertently transcribed; Although I have reviewed the note for such errors, some may still exist.

## 2020-09-30 NOTE — PROGRESS NOTES
Procedure   Large Joint Arthrocentesis: R subacromial bursa  Date/Time: 9/30/2020 10:12 AM  Consent given by: patient  Site marked: site marked  Timeout: Immediately prior to procedure a time out was called to verify the correct patient, procedure, equipment, support staff and site/side marked as required   Supporting Documentation  Indications: pain   Procedure Details  Location: shoulder - R subacromial bursa  Preparation: Patient was prepped and draped in the usual sterile fashion  Needle size: 22 G  Approach: posterior  Medications administered: 4 mL ropivacaine 0.5 %; 40 mg triamcinolone acetonide 40 MG/ML  Patient tolerance: patient tolerated the procedure well with no immediate complications

## 2020-10-19 ENCOUNTER — OFFICE VISIT (OUTPATIENT)
Dept: ORTHOPEDIC SURGERY | Facility: CLINIC | Age: 65
End: 2020-10-19

## 2020-10-19 VITALS — HEART RATE: 90 BPM | BODY MASS INDEX: 35.11 KG/M2 | OXYGEN SATURATION: 94 % | HEIGHT: 65 IN | WEIGHT: 210.76 LBS

## 2020-10-19 DIAGNOSIS — Z09 POSTOPERATIVE EXAMINATION: ICD-10-CM

## 2020-10-19 DIAGNOSIS — Z96.651 STATUS POST TOTAL RIGHT KNEE REPLACEMENT: Primary | ICD-10-CM

## 2020-10-19 PROCEDURE — 99213 OFFICE O/P EST LOW 20 MIN: CPT | Performed by: ORTHOPAEDIC SURGERY

## 2020-10-19 ASSESSMENT — KOOS JR
KOOS JR SCORE: 84.6
KOOS JR SCORE: 2

## 2020-10-19 NOTE — PROGRESS NOTES
[facility] Note    Subjective     Chief Complaint   Patient presents with   • Follow-up     10 months follow up; 1 year status post Right Total Knee Arthroplasty 09/26/2019        HPI    It has been 10  month(s) since Ms. Jett's last visit. She returns to clinic today for follow-up of right knee arthroplasty. She rates her pain a 0/10 on the pain scale. Previous/current treatments: cane/walker, NSAIDS and physical therapy. The pain is worse with climbing stairs; resting improve the pain. Overall, she is doing better.  100% improvement compared to her preoperative symptoms.  She is pleased with results.  Ambulatory without external aids.    I have reviewed the following portions of the patient's history and agree with: History of Present Illness and Review of Systems    Patient Active Problem List   Diagnosis   • Palpitations   • Hypertension   • Hyperlipidemia   • Obesity (BMI 30.0-34.9)   • Hypercholesterolemia   • Depression   • Knee osteoarthritis   • Hypothyroid   • Insomnia   • Primary osteoarthritis of right knee   • Status post total right knee replacement   • Leukocytosis, mild, likely reactive   • Acute blood loss anemia, mild, asymptomatic   • Acute postoperative pain     Past Medical History:   Diagnosis Date   • Acute pain of left knee    • Depression     Hx of   • Dyspnea    • Edema    • Hypercholesterolemia    • Hyperlipidemia    • Hypertension    • Hypothyroid      10/14 TSH 5.320;  04/15 TSH 4.580   • Insomnia    • Knee osteoarthritis     RT. meniscal scope, Dr. Alvino Cifuentes, 01/15. removal of ganglion cyst 01/15   • Obesity (BMI 30.0-34.9)     BMI 33.66   • Palpitations    • Wears glasses     READING      Past Surgical History:   Procedure Laterality Date   • APPENDECTOMY     • BLADDER SURGERY     • BREAST BIOPSY Left    • COLONOSCOPY  2017   • GANGLION CYST EXCISION  01/2015    R KNEE   • HYSTERECTOMY     • INCISIONAL BREAST BIOPSY     • KNEE ARTHROSCOPY Right    •  TONSILLECTOMY AND ADENOIDECTOMY     • TOTAL KNEE ARTHROPLASTY Right 2019    Procedure: TOTAL KNEE ARTHROPLASTY RIGHT;  Surgeon: Medardo Cosme MD;  Location: [facility];  Service: Orthopedics      Family History   Problem Relation Age of Onset   • Heart attack Mother         Acute MI   • Hypertension Mother    • Stroke Mother    • Depression Mother    • Heart disease Mother    • Osteoarthritis Mother    • Heart attack Father 63           • Heart disease Father    • Hypertension Father    • Depression Other    • Heart disease Other    • Hypertension Other    • Anxiety disorder Daughter    • Depression Daughter      Social History     Socioeconomic History   • Marital status:      Spouse name: Not on file   • Number of children: 1   • Years of education: Not on file   • Highest education level: Master's degree (e.g., MA, MS, Misty, MEd, MSW, SIMON)   Tobacco Use   • Smoking status: Never Smoker   • Smokeless tobacco: Never Used   Substance and Sexual Activity   • Alcohol use: No   • Drug use: No   • Sexual activity: Defer      Current Outpatient Medications on File Prior to Visit   Medication Sig Dispense Refill   • aspirin 81 MG EC tablet Take 1 tablet by mouth Daily. Resume in 1 month     • buPROPion XL (WELLBUTRIN XL) 300 MG 24 hr tablet Take 1 tablet by mouth Daily. 30 tablet 1   • DULoxetine (Cymbalta) 30 MG capsule Take 1 capsule by mouth Daily. 30 capsule 2   • estradiol (ESTRACE) 1 MG tablet Take 1 mg by mouth Daily.     • FLUoxetine (PROzac) 20 MG capsule Take 1 capsule by mouth Daily. 30 capsule 2   • hydroCHLOROthiazide (HYDRODIURIL) 12.5 MG tablet      • losartan (COZAAR) 50 MG tablet      • losartan-hydrochlorothiazide (HYZAAR) 50-12.5 MG per tablet Take 1 tablet by mouth Daily.     • rosuvastatin (CRESTOR) 10 MG tablet Take 10 mg by mouth Every Night.  0     No current facility-administered medications on file prior to visit.       Allergies   Allergen Reactions   • Benadryl  "[Diphenhydramine] Other (See Comments)     SLEEPY   • Adhesive Tape Rash   • Percocet [Oxycodone-Acetaminophen] Other (See Comments)     SEVERE ITCHING IN HEAD        Review of Systems   Constitutional: Negative.    HENT: Negative.    Eyes: Negative.    Respiratory: Negative.    Cardiovascular: Negative.    Gastrointestinal: Negative.    Endocrine: Negative.    Genitourinary: Negative.    Musculoskeletal: Positive for arthralgias.   Skin: Negative.    Allergic/Immunologic: Negative.    Neurological: Negative.    Hematological: Negative.    Psychiatric/Behavioral: Negative.         Objective      Physical Exam  Pulse 90   Ht 165.1 cm (65\")   Wt 95.6 kg (210 lb 12.2 oz)   SpO2 94%   BMI 35.07 kg/m²     Body mass index is 35.07 kg/m².    General:   Mental Status:  Alert   Appearance: Cooperative, in no acute distress   Build and Nutrition: Overweight female   Orientation: Alert and oriented to person, place and time   Posture: Normal   Gait: Normal    Integument:   Right knee: Wound is well-healed with no signs of infection    Lower Extremities:   Right Knee:    Tenderness:  None    Effusion:  None    Swelling: None    Crepitus:  None    Range of motion:  Extension: 0°       Flexion: 130°  Instability:  No varus laxity, no valgus laxity, negative anterior drawer  Deformities:  None      Imaging/Studies  Imaging Results (Last 24 Hours)     Procedure Component Value Units Date/Time    XR Knee 3+ View With [employer] Right [[id number]] Resulted: 10/19/20 1545     Updated: 10/19/20 1546    Narrative:      Right Knee Radiographs  Indication: status-post right total knee arthroplasty  Views: AP, lateral, and sunrise views of the right knee    Comparison: 10/21/2019    Findings:   Small lucency under the medial aspect of the tibial plate, with overgrowth   of the bone laterally on the tibia, with no other unusual features, with   no gross signs of loosening or failure.            Assessment and Plan     Diagnoses and all " orders for this visit:    1. Status post total right knee replacement (Primary)  -     XR Knee 3+ View With [place] Right    2. Postoperative examination        1. Status post total right knee replacement    2. Postoperative examination        I reviewed my findings with patient today.  Her right total knee arthroplasty is functioning well, and she is pleased with results.  I will see her back in 4 years, which will be a 5-year checkup, but sooner for any problems.    Return in about 4 years (around 10/19/2024) for Recheck with X-Rays.    Medical Decision Making  Data/Risk: radiology tests and independent visualization of imaging, lab tests, or EMG/NCV      Medardo Cosme MD  10/19/20  16:00 EDT    Dragon disclaimer:  Much of this encounter note is an electronic transcription/translation of spoken language to printed text. The electronic translation of spoken language may permit erroneous, or at times, nonsensical words or phrases to be inadvertently transcribed; Although I have reviewed the note for such errors, some may still exist.

## 2020-12-30 ENCOUNTER — OFFICE VISIT (OUTPATIENT)
Dept: PSYCHIATRY | Facility: CLINIC | Age: 65
End: 2020-12-30

## 2020-12-30 VITALS
WEIGHT: 217.2 LBS | BODY MASS INDEX: 36.19 KG/M2 | HEIGHT: 65 IN | DIASTOLIC BLOOD PRESSURE: 76 MMHG | SYSTOLIC BLOOD PRESSURE: 135 MMHG | TEMPERATURE: 97.5 F | HEART RATE: 92 BPM

## 2020-12-30 DIAGNOSIS — F33.1 MODERATE EPISODE OF RECURRENT MAJOR DEPRESSIVE DISORDER (HCC): Primary | ICD-10-CM

## 2020-12-30 DIAGNOSIS — Z79.899 MEDICATION MANAGEMENT: ICD-10-CM

## 2020-12-30 DIAGNOSIS — F41.1 GENERALIZED ANXIETY DISORDER: ICD-10-CM

## 2020-12-30 DIAGNOSIS — R41.89 COMPLAINT RELATED TO DREAMS: ICD-10-CM

## 2020-12-30 LAB
AMPHETAMINE CUT-OFF: NORMAL
BENZODIAZIPINE CUT-OFF: NORMAL
BUPRENORPHINE CUT-OFF: NORMAL
COCAINE CUT-OFF: NORMAL
EXTERNAL AMPHETAMINE SCREEN URINE: NEGATIVE
EXTERNAL BENZODIAZEPINE SCREEN URINE: NEGATIVE
EXTERNAL BUPRENORPHINE SCREEN URINE: NEGATIVE
EXTERNAL COCAINE SCREEN URINE: NEGATIVE
EXTERNAL MDMA: NEGATIVE
EXTERNAL METHADONE SCREEN URINE: NEGATIVE
EXTERNAL METHAMPHETAMINE SCREEN URINE: NEGATIVE
EXTERNAL OPIATES SCREEN URINE: NEGATIVE
EXTERNAL OXYCODONE SCREEN URINE: NEGATIVE
EXTERNAL THC SCREEN URINE: NEGATIVE
MDMA CUT-OFF: NORMAL
METHADONE CUT-OFF: NORMAL
METHAMPHETAMINE CUT-OFF: NORMAL
OPIATES CUT-OFF: NORMAL
OXYCODONE CUT-OFF: NORMAL
THC CUT-OFF: NORMAL

## 2020-12-30 PROCEDURE — 99214 OFFICE O/P EST MOD 30 MIN: CPT | Performed by: PSYCHIATRY & NEUROLOGY

## 2020-12-30 RX ORDER — BUPROPION HYDROCHLORIDE 150 MG/1
150 TABLET ORAL DAILY
Qty: 30 TABLET | Refills: 1 | Status: SHIPPED | OUTPATIENT
Start: 2020-12-30 | End: 2021-02-18

## 2020-12-30 RX ORDER — PRAZOSIN HYDROCHLORIDE 1 MG/1
1 CAPSULE ORAL NIGHTLY
Qty: 30 CAPSULE | Refills: 1 | Status: SHIPPED | OUTPATIENT
Start: 2020-12-30 | End: 2021-02-18

## 2020-12-30 RX ORDER — BUPROPION HYDROCHLORIDE 300 MG/1
300 TABLET ORAL DAILY
Qty: 30 TABLET | Refills: 1 | Status: SHIPPED | OUTPATIENT
Start: 2020-12-30 | End: 2021-02-18 | Stop reason: SDUPTHER

## 2020-12-30 RX ORDER — ERGOCALCIFEROL 1.25 MG/1
CAPSULE ORAL
COMMUNITY
Start: 2020-12-02 | End: 2022-10-26

## 2020-12-30 RX ORDER — FLUOXETINE HYDROCHLORIDE 40 MG/1
40 CAPSULE ORAL DAILY
Qty: 30 CAPSULE | Refills: 1 | Status: SHIPPED | OUTPATIENT
Start: 2020-12-30 | End: 2021-02-18 | Stop reason: SDUPTHER

## 2020-12-30 RX ORDER — CYANOCOBALAMIN 1000 UG/ML
INJECTION, SOLUTION INTRAMUSCULAR; SUBCUTANEOUS
COMMUNITY
Start: 2020-12-02 | End: 2022-04-05

## 2020-12-31 NOTE — PROGRESS NOTES
"Subjective   Germaine Jett is a 65 y.o. female who presents today for follow up    Chief Complaint:  Depression and Anxiety    History of Present Illness: Patient presents for follow up though this is my initial encounter with the patient as she was previously seeing other providers in the clinic. She has a history of depression and anxiety and is currently managed on Cymbalta 30mg, Prozac 20mg, and Wellbutrin XL 300mg. She reports that she remains symptomatic. She is quick to anger and gets easily agitated. 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 She feels her depression is related to her reactivity and anxiety and feels that she is fatigued, anxious, and mentally slowed. She was started on Cymbalta for pain which has helped. She has tried many antidepressants in the past but Prozac was always beneficial so this is the reason she was on both. She does feel better with Wellbutrin as far as fatigue and mood but continues to struggle. She also endorses it helps her not overeat or eat in between meals. Patient reports sleep is easy to initiate and she sleeps through the night but she has bizarre, and crazy dreams in \"all five senses,\" which causes her to not feel rested in the morning. She denies SI/HI/AVH.     The following portions of the patient's history were reviewed and updated as appropriate: allergies, current medications, past family history, past medical history, past social history, past surgical history and problem list.      Past Medical History:  Past Medical History:   Diagnosis Date   • Acute pain of left knee    • Depression     Hx of   • Dyspnea    • Edema    • Hypercholesterolemia    • Hyperlipidemia    • Hypertension    • Hypothyroid      10/14 TSH 5.320;  04/15 TSH 4.580   • Insomnia    • Knee osteoarthritis     RT. meniscal scope, Dr. Alvino Cifuentes, 01/15. removal of ganglion cyst 01/15   • Obesity (BMI 30.0-34.9)     BMI " 33.66   • Palpitations    • Wears glasses     READING       Social History:  Social History     Socioeconomic History   • Marital status:      Spouse name: Not on file   • Number of children: 1   • Years of education: Not on file   • Highest education level: Master's degree (e.g., MA, MS, Misty, MEd, MSW, SIMON)   Tobacco Use   • Smoking status: Never Smoker   • Smokeless tobacco: Never Used   Substance and Sexual Activity   • Alcohol use: No   • Drug use: No   • Sexual activity: Defer       Family History:  Family History   Problem Relation Age of Onset   • Heart attack Mother         Acute MI   • Hypertension Mother    • Stroke Mother    • Depression Mother    • Heart disease Mother    • Osteoarthritis Mother    • Heart attack Father 63           • Heart disease Father    • Hypertension Father    • Depression Other    • Heart disease Other    • Hypertension Other    • Anxiety disorder Daughter    • Depression Daughter        Past Surgical History:  Past Surgical History:   Procedure Laterality Date   • APPENDECTOMY     • BLADDER SURGERY     • BREAST BIOPSY Left    • COLONOSCOPY     • GANGLION CYST EXCISION  2015    R KNEE   • HYSTERECTOMY     • INCISIONAL BREAST BIOPSY     • KNEE ARTHROSCOPY Right    • TONSILLECTOMY AND ADENOIDECTOMY     • TOTAL KNEE ARTHROPLASTY Right 2019    Procedure: TOTAL KNEE ARTHROPLASTY RIGHT;  Surgeon: Medardo Cosme MD;  Location: [facility];  Service: Orthopedics       Problem List:  Patient Active Problem List   Diagnosis   • Palpitations   • Hypertension   • Hyperlipidemia   • Obesity (BMI 30.0-34.9)   • Hypercholesterolemia   • Depression   • Knee osteoarthritis   • Hypothyroid   • Insomnia   • Primary osteoarthritis of right knee   • Status post total right knee replacement   • Leukocytosis, mild, likely reactive   • Acute blood loss anemia, mild, asymptomatic   • Acute postoperative pain       Allergy:   Allergies   Allergen Reactions   • Benadryl  "[Diphenhydramine] Other (See Comments)     SLEEPY   • Adhesive Tape Rash   • Percocet [Oxycodone-Acetaminophen] Other (See Comments)     SEVERE ITCHING IN HEAD        Current Medications:   Current Outpatient Medications   Medication Sig Dispense Refill   • aspirin 81 MG EC tablet Take 1 tablet by mouth Daily. Resume in 1 month     • buPROPion XL (WELLBUTRIN XL) 300 MG 24 hr tablet Take 1 tablet by mouth Daily. Along with 150mg dose for total of 450mg. 30 tablet 1   • cyanocobalamin 1000 MCG/ML injection INJECT 1 ML INTRAMUSCULARLY ONCE A MONTH AS DIRECTED     • estradiol (ESTRACE) 1 MG tablet Take 1 mg by mouth Daily.     • FLUoxetine (PROzac) 40 MG capsule Take 1 capsule by mouth Daily. 30 capsule 1   • losartan-hydrochlorothiazide (HYZAAR) 50-12.5 MG per tablet Take 1 tablet by mouth Daily.     • rosuvastatin (CRESTOR) 10 MG tablet Take 10 mg by mouth Every Night.  0   • vitamin D (ERGOCALCIFEROL) 1.25 MG (63429 UT) capsule capsule TK 1 C PO 1 TIME A WK UTD     • buPROPion XL (WELLBUTRIN XL) 150 MG 24 hr tablet Take 1 tablet by mouth Daily. Along with dose of 300mg for total of 450mg. 30 tablet 1   • hydroCHLOROthiazide (HYDRODIURIL) 12.5 MG tablet      • losartan (COZAAR) 50 MG tablet      • prazosin (MINIPRESS) 1 MG capsule Take 1 capsule by mouth Every Night. 30 capsule 1     No current facility-administered medications for this visit.        Review of Symptoms:    Review of Systems   Constitutional: Positive for activity change and fatigue.   HENT: Negative.    Respiratory: Negative.    Cardiovascular: Negative.    Gastrointestinal: Negative.    Neurological: Negative.    Psychiatric/Behavioral: Positive for agitation, behavioral problems, dysphoric mood, sleep disturbance and stress. The patient is nervous/anxious.          Physical Exam:   Blood pressure 135/76, pulse 92, temperature 97.5 °F (36.4 °C), height 165.1 cm (65\"), weight 98.5 kg (217 lb 3.2 oz), not currently breastfeeding.    Appearance: CF of " stated age, NAD   Gait, Station, Strength: WNL    Mental Status Exam:   Hygiene:   good  Cooperation:  Cooperative  Eye Contact:  Good  Psychomotor Behavior:  Appropriate  Affect:  Full range  Mood: anxious  Hopelessness: Denies  Speech:  Normal  Thought Process:  Goal directed and Linear  Thought Content:  Normal and Mood congruent  Suicidal:  None  Homicidal:  None  Hallucinations:  None  Delusion:  None  Memory:  Intact  Orientation:  Person, Place, Time and Situation  Reliability:  good  Insight:  Good  Judgement:  Good  Impulse Control:  Good  Physical/Medical Issues:  No        Lab Results:   Office Visit on 12/30/2020   Component Date Value Ref Range Status   • External Amphetamine Screen Urine 12/30/2020 Negative   Final   • Amphetamine Cut-Off 12/30/2020 1000ng/ml   Final   • External Benzodiazepine Screen Uri* 12/30/2020 Negative   Final   • Benzodiazipine Cut-Off 12/30/2020 300ng/ml   Final   • External Cocaine Screen Urine 12/30/2020 Negative   Final   • Cocaine Cut-Off 12/30/2020 300ng/ml   Final   • External THC Screen Urine 12/30/2020 Negative   Final   • THC Cut-Off 12/30/2020 50ng/ml   Final   • External Methadone Screen Urine 12/30/2020 Negative   Final   • Methadone Cut-Off 12/30/2020 300ng/ml   Final   • External Methamphetamine Screen Ur* 12/30/2020 Negative   Final   • Methamphetamine Cut-Off 12/30/2020 1000ng/ml   Final   • External Oxycodone Screen Urine 12/30/2020 Negative   Final   • Oxycodone Cut-Off 12/30/2020 100ng/ml   Final   • External Buprenorphine Screen Urine 12/30/2020 Negative   Final   • Buprenorphine Cut-Off 12/30/2020 10ng/ml   Final   • External MDMA 12/30/2020 Negative   Final   • MDMA Cut-Off 12/30/2020 500ng/ml   Final   • External Opiates Screen Urine 12/30/2020 Negative   Final   • Opiates Cut-Off 12/30/2020 300ng/ml   Final       Assessment/Plan   Diagnoses and all orders for this visit:    1. Moderate episode of recurrent major depressive disorder (CMS/HCC)  (Primary)  -     FLUoxetine (PROzac) 40 MG capsule; Take 1 capsule by mouth Daily.  Dispense: 30 capsule; Refill: 1  -     buPROPion XL (WELLBUTRIN XL) 300 MG 24 hr tablet; Take 1 tablet by mouth Daily. Along with 150mg dose for total of 450mg.  Dispense: 30 tablet; Refill: 1  -     buPROPion XL (WELLBUTRIN XL) 150 MG 24 hr tablet; Take 1 tablet by mouth Daily. Along with dose of 300mg for total of 450mg.  Dispense: 30 tablet; Refill: 1    2. Generalized anxiety disorder  -     FLUoxetine (PROzac) 40 MG capsule; Take 1 capsule by mouth Daily.  Dispense: 30 capsule; Refill: 1  -     buPROPion XL (WELLBUTRIN XL) 300 MG 24 hr tablet; Take 1 tablet by mouth Daily. Along with 150mg dose for total of 450mg.  Dispense: 30 tablet; Refill: 1  -     buPROPion XL (WELLBUTRIN XL) 150 MG 24 hr tablet; Take 1 tablet by mouth Daily. Along with dose of 300mg for total of 450mg.  Dispense: 30 tablet; Refill: 1    3. Complaint related to dreams  -     prazosin (MINIPRESS) 1 MG capsule; Take 1 capsule by mouth Every Night.  Dispense: 30 capsule; Refill: 1    4. Medication management  -     [facility] Drug Screen    -This is my initial encounter with the patient  -Patient continues to struggle with irritability, depression, and anxiety though she has made some improvements. She feels fatigued during the day and is quick to anger. She also has vivid and bizarre dreams making her not feel appropriately rested in the morning.  -Reviewed previous available documentation  -Reviewed most recent available labs   -LALITHA reviewed and appropriate. Patient counseled on use of controlled substances.   -Increase Prozac to 40 mg p.o. daily for mood and anxiety  -Increase Wellbutrin XL to 450 mg p.o. daily for mood and anxiety  -Discontinue Cymbalta, patient was on this medication for pain, mood, and anxiety, but in order to maximize and simplify treatment regimen, we will discontinue to have only one serotonergic antidepressant with proven benefit to  patient  -Start Prazosin 1 mg p.o. nightly for dreams. Advised of the risk of drop in blood pressure/lightheadedness/orthostatic hypotension. Patient takes other blood pressure medication but her BP was elevated today.  -Encouraged to consider therapy.      Visit Diagnoses:    ICD-10-CM ICD-9-CM   1. Moderate episode of recurrent major depressive disorder (CMS/HCC)  F33.1 296.32   2. Generalized anxiety disorder  F41.1 300.02   3. Complaint related to dreams  R41.89 799.59   4. Medication management  Z79.899 V58.69       TREATMENT PLAN/GOALS: Continue supportive psychotherapy efforts and medications as indicated. Treatment and medication options discussed during today's visit. Patient acknowledged and verbally consented to continue with current treatment plan and was educated on the importance of compliance with treatment and follow-up appointments.    MEDICATION ISSUES:    Discussed medication options and treatment plan of prescribed medication as well as the risks, benefits, and side effects including potential falls, possible impaired driving and metabolic adversities among others. Patient is agreeable to call the office with any worsening of symptoms or onset of side effects. Patient is agreeable to call 911 or go to the nearest ER should he/she begin having SI/HI.     MEDS ORDERED DURING VISIT:  New Medications Ordered This Visit   Medications   • FLUoxetine (PROzac) 40 MG capsule     Sig: Take 1 capsule by mouth Daily.     Dispense:  30 capsule     Refill:  1   • buPROPion XL (WELLBUTRIN XL) 300 MG 24 hr tablet     Sig: Take 1 tablet by mouth Daily. Along with 150mg dose for total of 450mg.     Dispense:  30 tablet     Refill:  1   • prazosin (MINIPRESS) 1 MG capsule     Sig: Take 1 capsule by mouth Every Night.     Dispense:  30 capsule     Refill:  1   • buPROPion XL (WELLBUTRIN XL) 150 MG 24 hr tablet     Sig: Take 1 tablet by mouth Daily. Along with dose of 300mg for total of 450mg.     Dispense:  30  tablet     Refill:  1       Return in about 4 weeks (around 1/27/2021).             This document has been electronically signed by Sim Knight MD  December 31, 2020 15:11 EST

## 2021-02-18 ENCOUNTER — TELEMEDICINE (OUTPATIENT)
Dept: PSYCHIATRY | Facility: CLINIC | Age: 66
End: 2021-02-18

## 2021-02-18 DIAGNOSIS — F51.04 PSYCHOPHYSIOLOGICAL INSOMNIA: ICD-10-CM

## 2021-02-18 DIAGNOSIS — F33.41 RECURRENT MAJOR DEPRESSIVE DISORDER, IN PARTIAL REMISSION (HCC): Primary | ICD-10-CM

## 2021-02-18 DIAGNOSIS — F41.1 GENERALIZED ANXIETY DISORDER: ICD-10-CM

## 2021-02-18 PROCEDURE — 99214 OFFICE O/P EST MOD 30 MIN: CPT | Performed by: PSYCHIATRY & NEUROLOGY

## 2021-02-18 RX ORDER — FLUOXETINE HYDROCHLORIDE 40 MG/1
40 CAPSULE ORAL DAILY
Qty: 30 CAPSULE | Refills: 2 | Status: SHIPPED | OUTPATIENT
Start: 2021-02-18 | End: 2021-07-21 | Stop reason: SDUPTHER

## 2021-02-18 RX ORDER — BUPROPION HYDROCHLORIDE 300 MG/1
300 TABLET ORAL DAILY
Qty: 30 TABLET | Refills: 2 | Status: SHIPPED | OUTPATIENT
Start: 2021-02-18 | End: 2021-07-21 | Stop reason: SDUPTHER

## 2021-02-25 DIAGNOSIS — Z23 IMMUNIZATION DUE: ICD-10-CM

## 2021-03-04 NOTE — PROGRESS NOTES
Subjective   Germaine Jett is a 65 y.o. female who presents today for follow up    Chief Complaint:  Depression and Anxiety    This provider is located at [facility] at [address] [place] [address]. The provider identified himself as well as his credentials.   The Patient is at home using her phone because problems with video connection. The patient's condition being diagnosed/treated is appropriate for telemedicine. The patient gave consent to be seen remotely, and when consent is given they understand that the consent allows for patient identifiable information to be sent to a third party as needed.   They may refuse to be seen remotely at any time. The electronic data is encrypted and password protected, and the patient has been advised of the potential risks to privacy not withstanding such measures        History of Present Illness: Patient presents today for follow-up.  She reports that she feels that things have gotten better since last visit.  Her appetite seems more appropriate and she was able to lose 12 pounds.  She also reports that she continues to have some mild anxiety and depressive symptoms but feels that it has overall improved.  She is not taking prazosin as her nightmares have resolved and she could not tolerate Wellbutrin at 450 mg/day dose that she is doing well at 300 mg.  She is also only taking Prozac 40 mg.  She feels that she has gone to a better place in her life her life stressors have improved.  She denies any other medication side effects.  She currently denies any issues with sleep or appetite.  She denies SI/HI/AVH.     The following portions of the patient's history were reviewed and updated as appropriate: allergies, current medications, past family history, past medical history, past social history, past surgical history and problem list.      Past Medical History:  Past Medical History:   Diagnosis Date   • Acute pain of left knee    •  Depression     Hx of   • Dyspnea    • Edema    • Hypercholesterolemia    • Hyperlipidemia    • Hypertension    • Hypothyroid      10/14 TSH 5.320;  04/15 TSH 4.580   • Insomnia    • Knee osteoarthritis     RT. meniscal scope, Dr. Alvino Cifuentes, 01/15. removal of ganglion cyst 01/15   • Obesity (BMI 30.0-34.9)     BMI 33.66   • Palpitations    • Wears glasses     READING       Social History:  Social History     Socioeconomic History   • Marital status:      Spouse name: Not on file   • Number of children: 1   • Years of education: Not on file   • Highest education level: Master's degree (e.g., MA, MS, Misty, MEd, MSW, SIMON)   Tobacco Use   • Smoking status: Never Smoker   • Smokeless tobacco: Never Used   Substance and Sexual Activity   • Alcohol use: No   • Drug use: No   • Sexual activity: Defer       Family History:  Family History   Problem Relation Age of Onset   • Heart attack Mother         Acute MI   • Hypertension Mother    • Stroke Mother    • Depression Mother    • Heart disease Mother    • Osteoarthritis Mother    • Heart attack Father 63           • Heart disease Father    • Hypertension Father    • Depression Other    • Heart disease Other    • Hypertension Other    • Anxiety disorder Daughter    • Depression Daughter        Past Surgical History:  Past Surgical History:   Procedure Laterality Date   • APPENDECTOMY     • BLADDER SURGERY     • BREAST BIOPSY Left    • COLONOSCOPY  2017   • GANGLION CYST EXCISION  2015    R KNEE   • HYSTERECTOMY     • INCISIONAL BREAST BIOPSY     • KNEE ARTHROSCOPY Right    • TONSILLECTOMY AND ADENOIDECTOMY     • TOTAL KNEE ARTHROPLASTY Right 2019    Procedure: TOTAL KNEE ARTHROPLASTY RIGHT;  Surgeon: Medardo Cosme MD;  [facility];  Service: Orthopedics       Problem List:  Patient Active Problem List   Diagnosis   • Palpitations   • Hypertension   • Hyperlipidemia   • Obesity (BMI 30.0-34.9)   • Hypercholesterolemia   • Depression   • Knee  osteoarthritis   • Hypothyroid   • Insomnia   • Primary osteoarthritis of right knee   • Status post total right knee replacement   • Leukocytosis, mild, likely reactive   • Acute blood loss anemia, mild, asymptomatic   • Acute postoperative pain       Allergy:   Allergies   Allergen Reactions   • Benadryl [Diphenhydramine] Other (See Comments)     SLEEPY   • Adhesive Tape Rash   • Percocet [Oxycodone-Acetaminophen] Other (See Comments)     SEVERE ITCHING IN HEAD        Current Medications:   Current Outpatient Medications   Medication Sig Dispense Refill   • aspirin 81 MG EC tablet Take 1 tablet by mouth Daily. Resume in 1 month     • buPROPion XL (WELLBUTRIN XL) 300 MG 24 hr tablet Take 1 tablet by mouth Daily. Along with 150mg dose for total of 450mg. 30 tablet 2   • cyanocobalamin 1000 MCG/ML injection INJECT 1 ML INTRAMUSCULARLY ONCE A MONTH AS DIRECTED     • estradiol (ESTRACE) 1 MG tablet Take 1 mg by mouth Daily.     • FLUoxetine (PROzac) 40 MG capsule Take 1 capsule by mouth Daily. 30 capsule 2   • hydroCHLOROthiazide (HYDRODIURIL) 12.5 MG tablet      • losartan (COZAAR) 50 MG tablet      • losartan-hydrochlorothiazide (HYZAAR) 50-12.5 MG per tablet Take 1 tablet by mouth Daily.     • rosuvastatin (CRESTOR) 10 MG tablet Take 10 mg by mouth Every Night.  0   • vitamin D (ERGOCALCIFEROL) 1.25 MG (53495 UT) capsule capsule TK 1 C PO 1 TIME A WK UTD       No current facility-administered medications for this visit.        Review of Symptoms:    Review of Systems   Constitutional: Positive for activity change (improved). Negative for fatigue.   HENT: Negative.    Respiratory: Negative.    Cardiovascular: Negative.    Gastrointestinal: Negative.    Neurological: Negative.    Psychiatric/Behavioral: Positive for stress. Negative for agitation, behavioral problems, dysphoric mood and sleep disturbance. The patient is not nervous/anxious.          Physical Exam:   not currently breastfeeding.  Unable to assess,  telephone visit    Appearance: Unable to assess, telephone visit  Gait, Station, Strength: Unable to assess, telephone visit    Mental Status Exam:   Hygiene:   Unable to assess, telephone visit  Cooperation:  Cooperative  Eye Contact:  Unable to assess, telephone visit  Psychomotor Behavior:  Unable to assess, telephone visit  Affect:  Full range  Mood: normal and improved  Hopelessness: Denies  Speech:  Normal  Thought Process:  Goal directed and Linear  Thought Content:  Normal and Mood congruent  Suicidal:  None  Homicidal:  None  Hallucinations:  None  Delusion:  None  Memory:  Intact  Orientation:  Person, Place, Time and Situation  Reliability:  good  Insight:  Good  Judgement:  Good  Impulse Control:  Good  Physical/Medical Issues:  No        Lab Results:   No visits with results within 1 Month(s) from this visit.   Latest known visit with results is:   Office Visit on 12/30/2020   Component Date Value Ref Range Status   • External Amphetamine Screen Urine 12/30/2020 Negative   Final   • Amphetamine Cut-Off 12/30/2020 1000ng/ml   Final   • External Benzodiazepine Screen Uri* 12/30/2020 Negative   Final   • Benzodiazipine Cut-Off 12/30/2020 300ng/ml   Final   • External Cocaine Screen Urine 12/30/2020 Negative   Final   • Cocaine Cut-Off 12/30/2020 300ng/ml   Final   • External THC Screen Urine 12/30/2020 Negative   Final   • THC Cut-Off 12/30/2020 50ng/ml   Final   • External Methadone Screen Urine 12/30/2020 Negative   Final   • Methadone Cut-Off 12/30/2020 300ng/ml   Final   • External Methamphetamine Screen Ur* 12/30/2020 Negative   Final   • Methamphetamine Cut-Off 12/30/2020 1000ng/ml   Final   • External Oxycodone Screen Urine 12/30/2020 Negative   Final   • Oxycodone Cut-Off 12/30/2020 100ng/ml   Final   • External Buprenorphine Screen Urine 12/30/2020 Negative   Final   • Buprenorphine Cut-Off 12/30/2020 10ng/ml   Final   • External MDMA 12/30/2020 Negative   Final   • MDMA Cut-Off 12/30/2020  500ng/ml   Final   • External Opiates Screen Urine 12/30/2020 Negative   Final   • Opiates Cut-Off 12/30/2020 300ng/ml   Final       Assessment/Plan   Diagnoses and all orders for this visit:    1. Recurrent major depressive disorder, in partial remission (CMS/HCC) (Primary)  -     FLUoxetine (PROzac) 40 MG capsule; Take 1 capsule by mouth Daily.  Dispense: 30 capsule; Refill: 2  -     buPROPion XL (WELLBUTRIN XL) 300 MG 24 hr tablet; Take 1 tablet by mouth Daily. Along with 150mg dose for total of 450mg.  Dispense: 30 tablet; Refill: 2    2. Generalized anxiety disorder  -     FLUoxetine (PROzac) 40 MG capsule; Take 1 capsule by mouth Daily.  Dispense: 30 capsule; Refill: 2  -     buPROPion XL (WELLBUTRIN XL) 300 MG 24 hr tablet; Take 1 tablet by mouth Daily. Along with 150mg dose for total of 450mg.  Dispense: 30 tablet; Refill: 2    3. Psychophysiological insomnia    -Patient reports improvement since last visit.  She has some mild dysphoria and anxiety but feels she is coping appropriately and it is tolerable.  Her sleep and nightmares have improved.  She is able to reduce her medication burden as her symptoms have improved  -Reviewed previous available documentation  -Reviewed most recent available labs   -Continue Prozac 40 mg p.o. daily for mood and anxiety  -Increase Wellbutrin  mg to 300 mg p.o. daily for mood and anxiety  -Discontinue prazosin as insomnia and nightmares have improved  -Encouraged to consider therapy.  -Approximate appointment time 10:30 AM to 11 AM via telephone visit due to technical difficulty with video visit and weather conditions      Visit Diagnoses:    ICD-10-CM ICD-9-CM   1. Recurrent major depressive disorder, in partial remission (CMS/HCC)  F33.41 296.35   2. Generalized anxiety disorder  F41.1 300.02   3. Psychophysiological insomnia  F51.04 307.42       TREATMENT PLAN/GOALS: Continue supportive psychotherapy efforts and medications as indicated. Treatment and medication  options discussed during today's visit. Patient acknowledged and verbally consented to continue with current treatment plan and was educated on the importance of compliance with treatment and follow-up appointments.    MEDICATION ISSUES:    Discussed medication options and treatment plan of prescribed medication as well as the risks, benefits, and side effects including potential falls, possible impaired driving and metabolic adversities among others. Patient is agreeable to call the office with any worsening of symptoms or onset of side effects. Patient is agreeable to call 911 or go to the nearest ER should he/she begin having SI/HI.     MEDS ORDERED DURING VISIT:  New Medications Ordered This Visit   Medications   • FLUoxetine (PROzac) 40 MG capsule     Sig: Take 1 capsule by mouth Daily.     Dispense:  30 capsule     Refill:  2   • buPROPion XL (WELLBUTRIN XL) 300 MG 24 hr tablet     Sig: Take 1 tablet by mouth Daily. Along with 150mg dose for total of 450mg.     Dispense:  30 tablet     Refill:  2       Return in about 3 months (around 5/18/2021).             This document has been electronically signed by Sim Knight MD  March 4, 2021 07:31 EST

## 2021-05-11 ENCOUNTER — TRANSCRIBE ORDERS (OUTPATIENT)
Dept: ADMINISTRATIVE | Facility: HOSPITAL | Age: 66
End: 2021-05-11

## 2021-05-11 DIAGNOSIS — Z12.31 VISIT FOR SCREENING MAMMOGRAM: Primary | ICD-10-CM

## 2021-07-21 ENCOUNTER — OFFICE VISIT (OUTPATIENT)
Dept: PSYCHIATRY | Facility: CLINIC | Age: 66
End: 2021-07-21

## 2021-07-21 VITALS
WEIGHT: 190 LBS | SYSTOLIC BLOOD PRESSURE: 140 MMHG | HEIGHT: 65 IN | DIASTOLIC BLOOD PRESSURE: 74 MMHG | BODY MASS INDEX: 31.65 KG/M2 | HEART RATE: 80 BPM

## 2021-07-21 DIAGNOSIS — F41.1 GENERALIZED ANXIETY DISORDER: ICD-10-CM

## 2021-07-21 DIAGNOSIS — E66.09 CLASS 1 OBESITY DUE TO EXCESS CALORIES WITHOUT SERIOUS COMORBIDITY WITH BODY MASS INDEX (BMI) OF 31.0 TO 31.9 IN ADULT: ICD-10-CM

## 2021-07-21 DIAGNOSIS — F33.41 RECURRENT MAJOR DEPRESSIVE DISORDER, IN PARTIAL REMISSION (HCC): Primary | ICD-10-CM

## 2021-07-21 DIAGNOSIS — F51.04 PSYCHOPHYSIOLOGICAL INSOMNIA: ICD-10-CM

## 2021-07-21 PROCEDURE — 99214 OFFICE O/P EST MOD 30 MIN: CPT | Performed by: PSYCHIATRY & NEUROLOGY

## 2021-07-21 RX ORDER — FLUOXETINE HYDROCHLORIDE 40 MG/1
40 CAPSULE ORAL DAILY
Qty: 30 CAPSULE | Refills: 2 | Status: SHIPPED | OUTPATIENT
Start: 2021-07-21 | End: 2022-04-05 | Stop reason: SDUPTHER

## 2021-07-21 RX ORDER — BUPROPION HYDROCHLORIDE 300 MG/1
300 TABLET ORAL DAILY
Qty: 30 TABLET | Refills: 2 | Status: SHIPPED | OUTPATIENT
Start: 2021-07-21 | End: 2022-02-21

## 2021-07-21 RX ORDER — TRAZODONE HYDROCHLORIDE 50 MG/1
50 TABLET ORAL NIGHTLY PRN
Qty: 30 TABLET | Refills: 2 | Status: SHIPPED | OUTPATIENT
Start: 2021-07-21 | End: 2022-04-05

## 2021-07-21 RX ORDER — LANOLIN ALCOHOL/MO/W.PET/CERES
400 CREAM (GRAM) TOPICAL DAILY
Qty: 30 TABLET | Refills: 2 | Status: SHIPPED | OUTPATIENT
Start: 2021-07-21 | End: 2022-04-05

## 2021-07-22 NOTE — PROGRESS NOTES
Subjective   Germaine Jett is a 66 y.o. female who presents today for follow up    Chief Complaint:  Depression and Anxiety      History of Present Illness: Patient reports that since last visit she has been doing relatively well.  She did stop taking her medication on a regular basis which led to some minor worsening of irritability, mood symptoms, and anxiety.  She restarted her medications on a regular schedule and feels that they are helping control her symptoms appropriately.  She denies any major depressive or anxious symptoms currently.  She brings an old prescription of trazodone from 2019.  She reports that she takes this as needed and has needed some more lately.  We will prescribe this for insomnia today.  She denies any issues with appetite.  She denies SI/HI/AVH.  She is not having any medication side effects.  She reports that she has been working on her weight through dietary changes and physical activity and has successfully lost almost 30 pounds since her last weigh-in in December.  She asked about vitamin supplementation for energy today.  We discussed be vitamins and folate as well as a multivitamin.    The following portions of the patient's history were reviewed and updated as appropriate: allergies, current medications, past family history, past medical history, past social history, past surgical history and problem list.      Past Medical History:  Past Medical History:   Diagnosis Date   • Acute pain of left knee    • Depression     Hx of   • Dyspnea    • Edema    • Hypercholesterolemia    • Hyperlipidemia    • Hypertension    • Hypothyroid      10/14 TSH 5.320;  04/15 TSH 4.580   • Insomnia    • Knee osteoarthritis     RT. meniscal scope, Dr. Alvino Cifuentes, 01/15. removal of ganglion cyst 01/15   • Obesity (BMI 30.0-34.9)     BMI 33.66   • Palpitations    • Wears glasses     READING       Social History:  Social History     Socioeconomic History   • Marital status:      Spouse name:  Not on file   • Number of children: 1   • Years of education: Not on file   • Highest education level: Master's degree (e.g., MA, MS, Misty, MEd, MSW, SIMON)   Tobacco Use   • Smoking status: Never Smoker   • Smokeless tobacco: Never Used   Vaping Use   • Vaping Use: Never used   Substance and Sexual Activity   • Alcohol use: No   • Drug use: No   • Sexual activity: Defer       Family History:  Family History   Problem Relation Age of Onset   • Heart attack Mother         Acute MI   • Hypertension Mother    • Stroke Mother    • Depression Mother    • Heart disease Mother    • Osteoarthritis Mother    • Heart attack Father 63           • Heart disease Father    • Hypertension Father    • Depression Other    • Heart disease Other    • Hypertension Other    • Anxiety disorder Daughter    • Depression Daughter        Past Surgical History:  Past Surgical History:   Procedure Laterality Date   • APPENDECTOMY     • BLADDER SURGERY     • BREAST BIOPSY Left    • COLONOSCOPY     • GANGLION CYST EXCISION  2015    R KNEE   • HYSTERECTOMY     • INCISIONAL BREAST BIOPSY     • KNEE ARTHROSCOPY Right    • TONSILLECTOMY AND ADENOIDECTOMY     • TOTAL KNEE ARTHROPLASTY Right 2019    Procedure: TOTAL KNEE ARTHROPLASTY RIGHT;  Surgeon: Medardo Cosme MD;  Location: [facility];  Service: Orthopedics       Problem List:  Patient Active Problem List   Diagnosis   • Palpitations   • Hypertension   • Hyperlipidemia   • Obesity (BMI 30.0-34.9)   • Hypercholesterolemia   • Depression   • Knee osteoarthritis   • Hypothyroid   • Insomnia   • Primary osteoarthritis of right knee   • Status post total right knee replacement   • Leukocytosis, mild, likely reactive   • Acute blood loss anemia, mild, asymptomatic   • Acute postoperative pain       Allergy:   Allergies   Allergen Reactions   • Benadryl [Diphenhydramine] Other (See Comments)     SLEEPY   • Adhesive Tape Rash   • Percocet [Oxycodone-Acetaminophen] Other (See Comments)      "SEVERE ITCHING IN HEAD        Current Medications:   Current Outpatient Medications   Medication Sig Dispense Refill   • aspirin 81 MG EC tablet Take 1 tablet by mouth Daily. Resume in 1 month     • buPROPion XL (WELLBUTRIN XL) 300 MG 24 hr tablet Take 1 tablet by mouth Daily. 30 tablet 2   • estradiol (ESTRACE) 1 MG tablet Take 1 mg by mouth Daily.     • FLUoxetine (PROzac) 40 MG capsule Take 1 capsule by mouth Daily. 30 capsule 2   • losartan-hydrochlorothiazide (HYZAAR) 50-12.5 MG per tablet Take 1 tablet by mouth Daily.     • vitamin D (ERGOCALCIFEROL) 1.25 MG (68875 UT) capsule capsule TK 1 C PO 1 TIME A WK UTD     • b complex vitamins tablet Take 1 tablet by mouth Daily. 30 tablet 2   • cyanocobalamin 1000 MCG/ML injection INJECT 1 ML INTRAMUSCULARLY ONCE A MONTH AS DIRECTED     • folic acid (FOLVITE) 400 MCG tablet Take 1 tablet by mouth Daily. 30 tablet 2   • hydroCHLOROthiazide (HYDRODIURIL) 12.5 MG tablet      • losartan (COZAAR) 50 MG tablet      • rosuvastatin (CRESTOR) 10 MG tablet Take 10 mg by mouth Every Night.  0   • traZODone (DESYREL) 50 MG tablet Take 1 tablet by mouth At Night As Needed for Sleep. 30 tablet 2     No current facility-administered medications for this visit.       Review of Symptoms:    Review of Systems   Constitutional: Negative for activity change (improved) and fatigue.   HENT: Negative.    Respiratory: Negative.    Cardiovascular: Negative.    Gastrointestinal: Negative.    Neurological: Negative.    Psychiatric/Behavioral: Positive for sleep disturbance. Negative for agitation, behavioral problems, dysphoric mood and stress. The patient is not nervous/anxious.          Physical Exam:   Blood pressure 140/74, pulse 80, height 165.1 cm (65\"), weight 86.2 kg (190 lb), not currently breastfeeding.      Appearance:  female stated age in no acute distress  Gait, Station, Strength: WNL    Mental Status Exam:   Hygiene:   good  Cooperation:  Cooperative  Eye Contact:  " Good  Psychomotor Behavior:  Appropriate  Affect:  Full range  Mood: normal, stable   Hopelessness: Denies  Speech:  Normal  Thought Process:  Goal directed and Linear  Thought Content:  Normal and Mood congruent  Suicidal:  None  Homicidal:  None  Hallucinations:  None  Delusion:  None  Memory:  Intact  Orientation:  Person, Place, Time and Situation  Reliability:  good  Insight:  Good  Judgement:  Good  Impulse Control:  Good  Physical/Medical Issues:  No        Lab Results:   No visits with results within 1 Month(s) from this visit.   Latest known visit with results is:   Office Visit on 12/30/2020   Component Date Value Ref Range Status   • External Amphetamine Screen Urine 12/30/2020 Negative   Final   • Amphetamine Cut-Off 12/30/2020 1000ng/ml   Final   • External Benzodiazepine Screen Uri* 12/30/2020 Negative   Final   • Benzodiazipine Cut-Off 12/30/2020 300ng/ml   Final   • External Cocaine Screen Urine 12/30/2020 Negative   Final   • Cocaine Cut-Off 12/30/2020 300ng/ml   Final   • External THC Screen Urine 12/30/2020 Negative   Final   • THC Cut-Off 12/30/2020 50ng/ml   Final   • External Methadone Screen Urine 12/30/2020 Negative   Final   • Methadone Cut-Off 12/30/2020 300ng/ml   Final   • External Methamphetamine Screen Ur* 12/30/2020 Negative   Final   • Methamphetamine Cut-Off 12/30/2020 1000ng/ml   Final   • External Oxycodone Screen Urine 12/30/2020 Negative   Final   • Oxycodone Cut-Off 12/30/2020 100ng/ml   Final   • External Buprenorphine Screen Urine 12/30/2020 Negative   Final   • Buprenorphine Cut-Off 12/30/2020 10ng/ml   Final   • External MDMA 12/30/2020 Negative   Final   • MDMA Cut-Off 12/30/2020 500ng/ml   Final   • External Opiates Screen Urine 12/30/2020 Negative   Final   • Opiates Cut-Off 12/30/2020 300ng/ml   Final       Assessment/Plan   Diagnoses and all orders for this visit:    1. Recurrent major depressive disorder, in partial remission (CMS/HCC) (Primary)  -     buPROPion XL  (WELLBUTRIN XL) 300 MG 24 hr tablet; Take 1 tablet by mouth Daily.  Dispense: 30 tablet; Refill: 2  -     FLUoxetine (PROzac) 40 MG capsule; Take 1 capsule by mouth Daily.  Dispense: 30 capsule; Refill: 2  -     folic acid (FOLVITE) 400 MCG tablet; Take 1 tablet by mouth Daily.  Dispense: 30 tablet; Refill: 2  -     b complex vitamins tablet; Take 1 tablet by mouth Daily.  Dispense: 30 tablet; Refill: 2    2. Generalized anxiety disorder  -     buPROPion XL (WELLBUTRIN XL) 300 MG 24 hr tablet; Take 1 tablet by mouth Daily.  Dispense: 30 tablet; Refill: 2  -     FLUoxetine (PROzac) 40 MG capsule; Take 1 capsule by mouth Daily.  Dispense: 30 capsule; Refill: 2  -     folic acid (FOLVITE) 400 MCG tablet; Take 1 tablet by mouth Daily.  Dispense: 30 tablet; Refill: 2  -     b complex vitamins tablet; Take 1 tablet by mouth Daily.  Dispense: 30 tablet; Refill: 2    3. Psychophysiological insomnia  -     traZODone (DESYREL) 50 MG tablet; Take 1 tablet by mouth At Night As Needed for Sleep.  Dispense: 30 tablet; Refill: 2    4. Class 1 obesity due to excess calories without serious comorbidity with body mass index (BMI) of 31.0 to 31.9 in adult  -     buPROPion XL (WELLBUTRIN XL) 300 MG 24 hr tablet; Take 1 tablet by mouth Daily.  Dispense: 30 tablet; Refill: 2    -Patient reports that she is stable at baseline with no major mood or anxiety symptoms.  She did stop taking her medication which led to a mild exacerbation of depressive symptoms, mood changes with irritability, and anxiety but she started taking her medication reliably and this has resolved.  She is having some intermittent insomnia for which she takes trazodone as needed.  She has a prescription from 2019 that she has not completed but turns is in today to be disposed of by pharmacy and we will utilize a new prescription for sleep as needed.  She also asked about supplementation for energy through vitamins and we discussed B12 and folate as well as a B complex  vitamin and multivitamin.  -Reviewed previous available documentation  -Reviewed most recent available labs   -Continue Prozac 40 mg p.o. daily for mood and anxiety  -Continue Wellbutrin  mg p.o. daily for mood and anxiety as well as weight  -Start trazodone 50 mg p.o. nightly as needed for insomnia  -Start supplementation with folate and B complex vitamins for mood, anxiety, and energy level  -Obesity complicates all aspects of care  -Encouraged her continued healthy eating practices and physical activity as she has had some successful weight loss  -Encouraged to consider therapy.      Visit Diagnoses:    ICD-10-CM ICD-9-CM   1. Recurrent major depressive disorder, in partial remission (CMS/HCC)  F33.41 296.35   2. Generalized anxiety disorder  F41.1 300.02   3. Psychophysiological insomnia  F51.04 307.42   4. Class 1 obesity due to excess calories without serious comorbidity with body mass index (BMI) of 31.0 to 31.9 in adult  E66.09 278.00    Z68.31 V85.31       TREATMENT PLAN/GOALS: Continue supportive psychotherapy efforts and medications as indicated. Treatment and medication options discussed during today's visit. Patient acknowledged and verbally consented to continue with current treatment plan and was educated on the importance of compliance with treatment and follow-up appointments.    MEDICATION ISSUES:    Discussed medication options and treatment plan of prescribed medication as well as the risks, benefits, and side effects including potential falls, possible impaired driving and metabolic adversities among others. Patient is agreeable to call the office with any worsening of symptoms or onset of side effects. Patient is agreeable to call 911 or go to the nearest ER should he/she begin having SI/HI.     MEDS ORDERED DURING VISIT:  New Medications Ordered This Visit   Medications   • buPROPion XL (WELLBUTRIN XL) 300 MG 24 hr tablet     Sig: Take 1 tablet by mouth Daily.     Dispense:  30 tablet      Refill:  2   • FLUoxetine (PROzac) 40 MG capsule     Sig: Take 1 capsule by mouth Daily.     Dispense:  30 capsule     Refill:  2   • traZODone (DESYREL) 50 MG tablet     Sig: Take 1 tablet by mouth At Night As Needed for Sleep.     Dispense:  30 tablet     Refill:  2   • folic acid (FOLVITE) 400 MCG tablet     Sig: Take 1 tablet by mouth Daily.     Dispense:  30 tablet     Refill:  2   • b complex vitamins tablet     Sig: Take 1 tablet by mouth Daily.     Dispense:  30 tablet     Refill:  2       Return in about 3 months (around 10/21/2021).             This document has been electronically signed by Sim Knight MD  July 22, 2021 08:28 EDT

## 2021-08-03 ENCOUNTER — OFFICE VISIT (OUTPATIENT)
Dept: OBSTETRICS AND GYNECOLOGY | Facility: CLINIC | Age: 66
End: 2021-08-03

## 2021-08-03 VITALS
DIASTOLIC BLOOD PRESSURE: 80 MMHG | BODY MASS INDEX: 31.46 KG/M2 | WEIGHT: 188.8 LBS | SYSTOLIC BLOOD PRESSURE: 120 MMHG | HEIGHT: 65 IN

## 2021-08-03 DIAGNOSIS — Z79.890 HORMONE REPLACEMENT THERAPY (POSTMENOPAUSAL): ICD-10-CM

## 2021-08-03 DIAGNOSIS — Z01.419 ENCOUNTER FOR GYNECOLOGICAL EXAMINATION WITHOUT ABNORMAL FINDING: Primary | ICD-10-CM

## 2021-08-03 PROCEDURE — 99387 INIT PM E/M NEW PAT 65+ YRS: CPT | Performed by: PHYSICIAN ASSISTANT

## 2021-08-03 RX ORDER — ESTRADIOL 1 MG/1
1 TABLET ORAL DAILY
Qty: 30 TABLET | Refills: 12 | Status: SHIPPED | OUTPATIENT
Start: 2021-08-03 | End: 2022-11-02 | Stop reason: SDUPTHER

## 2021-08-03 NOTE — PROGRESS NOTES
Subjective   Chief Complaint   Patient presents with   • Gynecologic Exam     No pap, MMG is due in September, requesting a refill on Estradiol, No pap       Germaine Jett is a 66 y.o. year old  presenting to be seen for her annual gynecological exam.   Previously seeing Dr. Bui for gyn care  Hysterectomy and BSO . Has been using estradiol 1 mg hormone therapy since hysterectomy  Patient desires to continue hormone therapy  Is current with mammogram with last mammogram 2020      Past Medical History:   Diagnosis Date   • Acute pain of left knee    • Depression     Hx of   • Dyspnea    • Edema    • Hypercholesterolemia    • Hyperlipidemia    • Hypertension    • Hypothyroid      10/14 TSH 5.320;  04/15 TSH 4.580   • Insomnia    • Knee osteoarthritis     RT. meniscal scope, Dr. Alvino Cifuentes, 01/15. removal of ganglion cyst 01/15   • Obesity (BMI 30.0-34.9)     BMI 33.66   • Palpitations    • Wears glasses     READING        Current Outpatient Medications:   •  aspirin 81 MG EC tablet, Take 1 tablet by mouth Daily. Resume in 1 month, Disp: , Rfl:   •  buPROPion XL (WELLBUTRIN XL) 300 MG 24 hr tablet, Take 1 tablet by mouth Daily., Disp: 30 tablet, Rfl: 2  •  estradiol (ESTRACE) 1 MG tablet, Take 1 tablet by mouth Daily., Disp: 30 tablet, Rfl: 12  •  FLUoxetine (PROzac) 40 MG capsule, Take 1 capsule by mouth Daily., Disp: 30 capsule, Rfl: 2  •  losartan-hydrochlorothiazide (HYZAAR) 50-12.5 MG per tablet, Take 1 tablet by mouth Daily., Disp: , Rfl:   •  rosuvastatin (CRESTOR) 10 MG tablet, Take 10 mg by mouth Every Night., Disp: , Rfl: 0  •  b complex vitamins tablet, Take 1 tablet by mouth Daily., Disp: 30 tablet, Rfl: 2  •  cyanocobalamin 1000 MCG/ML injection, INJECT 1 ML INTRAMUSCULARLY ONCE A MONTH AS DIRECTED, Disp: , Rfl:   •  folic acid (FOLVITE) 400 MCG tablet, Take 1 tablet by mouth Daily., Disp: 30 tablet, Rfl: 2  •  hydroCHLOROthiazide (HYDRODIURIL) 12.5 MG tablet, , Disp: , Rfl:   •   losartan (COZAAR) 50 MG tablet, , Disp: , Rfl:   •  traZODone (DESYREL) 50 MG tablet, Take 1 tablet by mouth At Night As Needed for Sleep., Disp: 30 tablet, Rfl: 2  •  vitamin D (ERGOCALCIFEROL) 1.25 MG (32453 UT) capsule capsule, TK 1 C PO 1 TIME A WK UTD, Disp: , Rfl:    Allergies   Allergen Reactions   • Benadryl [Diphenhydramine] Other (See Comments)     SLEEPY   • Adhesive Tape Rash   • Percocet [Oxycodone-Acetaminophen] Other (See Comments)     SEVERE ITCHING IN HEAD      Past Surgical History:   Procedure Laterality Date   • APPENDECTOMY     • BLADDER SURGERY     • BREAST BIOPSY Left    • COLONOSCOPY  2017   • GANGLION CYST EXCISION  2015    R KNEE   • HYSTERECTOMY     • INCISIONAL BREAST BIOPSY     • KNEE ARTHROSCOPY Right    • OOPHORECTOMY     • TONSILLECTOMY AND ADENOIDECTOMY     • TOTAL ABDOMINAL HYSTERECTOMY WITH SALPINGO OOPHORECTOMY     • TOTAL KNEE ARTHROPLASTY Right 2019    Procedure: TOTAL KNEE ARTHROPLASTY RIGHT;  Surgeon: Medardo Cosme MD;  Location: [facility];  Service: Orthopedics      Social History     Socioeconomic History   • Marital status:      Spouse name: Not on file   • Number of children: 1   • Years of education: Not on file   • Highest education level: Master's degree (e.g., MA, MS, Misty, MEd, MSW, SIOMN)   Tobacco Use   • Smoking status: Never Smoker   • Smokeless tobacco: Never Used   Vaping Use   • Vaping Use: Never used   Substance and Sexual Activity   • Alcohol use: No   • Drug use: No   • Sexual activity: Yes     Partners: Male     Birth control/protection: Post-menopausal, Surgical      Family History   Problem Relation Age of Onset   • Heart attack Mother         Acute MI   • Hypertension Mother    • Stroke Mother    • Depression Mother    • Heart disease Mother    • Osteoarthritis Mother    • Heart attack Father 63           • Heart disease Father    • Hypertension Father    • Depression Other    • Heart disease Other    • Hypertension Other    • Anxiety  "disorder Daughter    • Depression Daughter        Review of Systems   Constitutional: Negative for chills, diaphoresis and fever.   Gastrointestinal: Positive for constipation.   Genitourinary: Positive for frequency. Negative for difficulty urinating, dysuria and enuresis.   Psychiatric/Behavioral: Positive for dysphoric mood and sleep disturbance.           Objective   /80   Ht 165.1 cm (65\")   Wt 85.6 kg (188 lb 12.8 oz)   Breastfeeding No   BMI 31.42 kg/m²     Physical Exam  Constitutional:       Appearance: Normal appearance. She is well-developed and well-groomed.   Eyes:      General: Lids are normal.      Extraocular Movements: Extraocular movements intact.      Conjunctiva/sclera: Conjunctivae normal.   Neck:      Thyroid: No thyroid mass or thyromegaly.   Chest:      Breasts: Breasts are symmetrical.         Right: No inverted nipple, mass, nipple discharge, skin change or tenderness.         Left: No inverted nipple, mass, nipple discharge, skin change or tenderness.   Abdominal:      General: There is no distension.      Palpations: Abdomen is soft. There is no hepatomegaly or splenomegaly.      Tenderness: There is no abdominal tenderness.   Genitourinary:     Exam position: Lithotomy position.      Labia:         Right: No rash, tenderness or lesion.         Left: No rash, tenderness or lesion.       Urethra: No prolapse, urethral pain, urethral swelling or urethral lesion.      Vagina: No vaginal discharge, tenderness or lesions.      Uterus: Absent.       Adnexa:         Right: No mass or tenderness.          Left: No mass or tenderness.     Musculoskeletal:      Cervical back: Neck supple.   Lymphadenopathy:      Upper Body:      Right upper body: No axillary adenopathy.      Left upper body: No axillary adenopathy.   Skin:     General: Skin is warm and dry.      Findings: No lesion.   Neurological:      General: No focal deficit present.      Mental Status: She is alert and oriented to " person, place, and time.   Psychiatric:         Attention and Perception: Attention normal.         Mood and Affect: Mood normal.         Speech: Speech normal.         Behavior: Behavior normal.         Thought Content: Thought content normal.            Result Review :                   Assessment and Plan  Diagnoses and all orders for this visit:    1. Encounter for gynecological examination without abnormal finding (Primary)    2. Hormone replacement therapy (postmenopausal)    Other orders  -     estradiol (ESTRACE) 1 MG tablet; Take 1 tablet by mouth Daily.  Dispense: 30 tablet; Refill: 12      Patient Instructions   Self breast exam monthly  Annual mammogram  vit D 2000 mg daily  Regular excercise               This note was electronically signed.    Edwige Frausto PA-C   August 3, 2021

## 2021-08-25 ENCOUNTER — OFFICE VISIT (OUTPATIENT)
Dept: ORTHOPEDIC SURGERY | Facility: CLINIC | Age: 66
End: 2021-08-25

## 2021-08-25 VITALS
DIASTOLIC BLOOD PRESSURE: 70 MMHG | SYSTOLIC BLOOD PRESSURE: 184 MMHG | WEIGHT: 189 LBS | BODY MASS INDEX: 31.49 KG/M2 | HEIGHT: 65 IN | HEART RATE: 88 BPM

## 2021-08-25 DIAGNOSIS — M75.41 IMPINGEMENT SYNDROME OF RIGHT SHOULDER: Primary | ICD-10-CM

## 2021-08-25 PROCEDURE — 20610 DRAIN/INJ JOINT/BURSA W/O US: CPT | Performed by: ORTHOPAEDIC SURGERY

## 2021-08-25 PROCEDURE — 99213 OFFICE O/P EST LOW 20 MIN: CPT | Performed by: ORTHOPAEDIC SURGERY

## 2021-08-25 RX ORDER — ROPIVACAINE HYDROCHLORIDE 5 MG/ML
4 INJECTION, SOLUTION EPIDURAL; INFILTRATION; PERINEURAL
Status: COMPLETED | OUTPATIENT
Start: 2021-08-25 | End: 2021-08-25

## 2021-08-25 RX ORDER — TRIAMCINOLONE ACETONIDE 40 MG/ML
40 INJECTION, SUSPENSION INTRA-ARTICULAR; INTRAMUSCULAR
Status: COMPLETED | OUTPATIENT
Start: 2021-08-25 | End: 2021-08-25

## 2021-08-25 RX ADMIN — TRIAMCINOLONE ACETONIDE 40 MG: 40 INJECTION, SUSPENSION INTRA-ARTICULAR; INTRAMUSCULAR at 15:56

## 2021-08-25 RX ADMIN — ROPIVACAINE HYDROCHLORIDE 4 ML: 5 INJECTION, SOLUTION EPIDURAL; INFILTRATION; PERINEURAL at 15:56

## 2021-08-25 NOTE — PROGRESS NOTES
Procedure   Large Joint Arthrocentesis: R subacromial bursa  Date/Time: 8/25/2021 3:56 PM  Consent given by: patient  Site marked: site marked  Timeout: Immediately prior to procedure a time out was called to verify the correct patient, procedure, equipment, support staff and site/side marked as required   Supporting Documentation  Indications: pain   Procedure Details  Location: shoulder - R subacromial bursa  Preparation: Patient was prepped and draped in the usual sterile fashion  Needle size: 22 G  Approach: anterolateral  Medications administered: 40 mg triamcinolone acetonide 40 MG/ML; 4 mL ropivacaine 0.5 %  Patient tolerance: patient tolerated the procedure well with no immediate complications

## 2021-08-25 NOTE — PROGRESS NOTES
"    [facility] [place] Orthopaedic Surgery Clinic Note    Subjective     Chief Complaint   Patient presents with   • Right Shoulder - Follow-up     11 month f/u, Impingement syndrome of right shoulder last injection 09/30/20            HPI    Germaine Jett is a 66 y.o. female who follows up for right shoulder pain.     The injection in 09/2020 provided her relief. She has pain at night and she believes it may be due to her position when she sleeps. Her right shoulder is currently \"sore;\" however, it can be 7 out of 10. She has lost 22 pounds since her last visit. She describes her pain as burning, painful, and aggravating.     She has been seen for this previously and had a previous subacromial injection on 09/30/2020.  No previous MRI.    I have reviewed the following portions of the patient's history and agree with: History of Present Illness and Review of Systems    Patient Active Problem List   Diagnosis   • Palpitations   • Hypertension   • Hyperlipidemia   • Obesity (BMI 30.0-34.9)   • Hypercholesterolemia   • Depression   • Knee osteoarthritis   • Hypothyroid   • Insomnia   • Primary osteoarthritis of right knee   • Status post total right knee replacement   • Leukocytosis, mild, likely reactive   • Acute blood loss anemia, mild, asymptomatic   • Acute postoperative pain     Past Medical History:   Diagnosis Date   • Acute pain of left knee    • Depression     Hx of   • Dyspnea    • Edema    • Hypercholesterolemia    • Hyperlipidemia    • Hypertension    • Hypothyroid      10/14 TSH 5.320;  04/15 TSH 4.580   • Insomnia    • Knee osteoarthritis     RT. meniscal scope, Dr. Alvino Cifuentes, 01/15. removal of ganglion cyst 01/15   • Obesity (BMI 30.0-34.9)     BMI 33.66   • Palpitations    • Wears glasses     READING      Past Surgical History:   Procedure Laterality Date   • APPENDECTOMY     • BLADDER SURGERY     • BREAST BIOPSY Left    • COLONOSCOPY  2017   • GANGLION CYST EXCISION  01/2015    R KNEE   • HYSTERECTOMY     • " INCISIONAL BREAST BIOPSY     • KNEE ARTHROSCOPY Right    • OOPHORECTOMY     • TONSILLECTOMY AND ADENOIDECTOMY     • TOTAL ABDOMINAL HYSTERECTOMY WITH SALPINGO OOPHORECTOMY     • TOTAL KNEE ARTHROPLASTY Right 2019    Procedure: TOTAL KNEE ARTHROPLASTY RIGHT;  Surgeon: Medardo Cosme MD;  Location: [facility];  Service: Orthopedics      Family History   Problem Relation Age of Onset   • Heart attack Mother         Acute MI   • Hypertension Mother    • Stroke Mother    • Depression Mother    • Heart disease Mother    • Osteoarthritis Mother    • Heart attack Father 63           • Heart disease Father    • Hypertension Father    • Depression Other    • Heart disease Other    • Hypertension Other    • Anxiety disorder Daughter    • Depression Daughter      Social History     Socioeconomic History   • Marital status:      Spouse name: Not on file   • Number of children: 1   • Years of education: Not on file   • Highest education level: Master's degree (e.g., MA, MS, Misty, MEd, MSW, SIMON)   Tobacco Use   • Smoking status: Never Smoker   • Smokeless tobacco: Never Used   Vaping Use   • Vaping Use: Never used   Substance and Sexual Activity   • Alcohol use: No   • Drug use: No   • Sexual activity: Yes     Partners: Male     Birth control/protection: Post-menopausal, Surgical      Current Outpatient Medications on File Prior to Visit   Medication Sig Dispense Refill   • aspirin 81 MG EC tablet Take 1 tablet by mouth Daily. Resume in 1 month     • b complex vitamins tablet Take 1 tablet by mouth Daily. 30 tablet 2   • buPROPion XL (WELLBUTRIN XL) 300 MG 24 hr tablet Take 1 tablet by mouth Daily. 30 tablet 2   • cyanocobalamin 1000 MCG/ML injection INJECT 1 ML INTRAMUSCULARLY ONCE A MONTH AS DIRECTED     • estradiol (ESTRACE) 1 MG tablet Take 1 tablet by mouth Daily. 30 tablet 12   • FLUoxetine (PROzac) 40 MG capsule Take 1 capsule by mouth Daily. 30 capsule 2   • folic acid (FOLVITE) 400 MCG tablet Take 1  tablet by mouth Daily. 30 tablet 2   • hydroCHLOROthiazide (HYDRODIURIL) 12.5 MG tablet      • losartan (COZAAR) 50 MG tablet      • losartan-hydrochlorothiazide (HYZAAR) 50-12.5 MG per tablet Take 1 tablet by mouth Daily.     • rosuvastatin (CRESTOR) 10 MG tablet Take 10 mg by mouth Every Night.  0   • traZODone (DESYREL) 50 MG tablet Take 1 tablet by mouth At Night As Needed for Sleep. 30 tablet 2   • vitamin D (ERGOCALCIFEROL) 1.25 MG (91327 UT) capsule capsule TK 1 C PO 1 TIME A WK UTD       No current facility-administered medications on file prior to visit.      Allergies   Allergen Reactions   • Benadryl [Diphenhydramine] Other (See Comments)     SLEEPY   • Adhesive Tape Rash   • Percocet [Oxycodone-Acetaminophen] Other (See Comments)     SEVERE ITCHING IN HEAD        Review of Systems   Constitutional: Negative.  Negative for activity change, appetite change, chills, diaphoresis, fatigue, fever and unexpected weight change.   HENT: Negative.  Negative for congestion, dental problem, drooling, ear discharge, ear pain, facial swelling, hearing loss, mouth sores, nosebleeds, postnasal drip, rhinorrhea, sinus pressure, sneezing, sore throat, tinnitus, trouble swallowing and voice change.    Eyes: Negative.  Negative for photophobia, pain, discharge, redness, itching and visual disturbance.   Respiratory: Negative.  Negative for apnea, cough, choking, chest tightness, shortness of breath, wheezing and stridor.    Cardiovascular: Negative.  Negative for chest pain, palpitations and leg swelling.   Gastrointestinal: Negative.  Negative for abdominal distention, abdominal pain, anal bleeding, blood in stool, constipation, diarrhea, nausea, rectal pain and vomiting.   Endocrine: Negative.  Negative for cold intolerance, heat intolerance, polydipsia, polyphagia and polyuria.   Genitourinary: Negative.  Negative for decreased urine volume, difficulty urinating, dysuria, enuresis, flank pain, frequency, genital sores,  "hematuria and urgency.   Musculoskeletal: Positive for arthralgias. Negative for back pain, gait problem, joint swelling, myalgias, neck pain and neck stiffness.   Skin: Negative.  Negative for color change, pallor, rash and wound.   Allergic/Immunologic: Negative.  Negative for environmental allergies, food allergies and immunocompromised state.   Neurological: Negative.  Negative for dizziness, tremors, seizures, syncope, facial asymmetry, speech difficulty, weakness, light-headedness, numbness and headaches.   Hematological: Negative.  Negative for adenopathy. Does not bruise/bleed easily.   Psychiatric/Behavioral: Negative.  Negative for agitation, behavioral problems, confusion, decreased concentration, dysphoric mood, hallucinations, self-injury, sleep disturbance and suicidal ideas. The patient is not nervous/anxious and is not hyperactive.         Objective      Physical Exam  BP (!) 184/70   Pulse 88   Ht 165.1 cm (65\")   Wt 85.7 kg (189 lb)   BMI 31.45 kg/m²     Body mass index is 31.45 kg/m².    General:   Mental Status:  Alert   Appearance: Cooperative, in no acute distress   Build and Nutrition: Well-nourished well-developed female   Orientation: Alert and oriented to person, place and time   Posture: Normal   Gait: Normal    Integument:  • Right shoulder: No skin lesions, rash, or ecchymosis.    Upper Extremities  • Right Shoulder:  • Tenderness: None  • Swelling: None  • Range of motion:  • External rotation: 70°  • Forward flexion: 90°  • Abduction: 120°  • Deformities: None  • Functional Testing:  • Drop Arm: Negative  • Lift off: Negative.  • Impingement: Positive    Imaging/Studies  Imaging Results (Last 24 Hours)     Procedure Component Value Units Date/Time    XR Shoulder 2+ View Right [[id number]] Resulted: 08/25/21 1546     Updated: 08/25/21 1546    Narrative:      Right Shoulder Radiographs  Indication: right shoulder pain  Views: AP, outlet and axillary views of the right " shoulder    Comparison: no prior studies available for review    Findings:  No acute bony abnormalities.  Sclerosis of the greater tuberosity.  No   unusual bony features.              Assessment and Plan     Diagnoses and all orders for this visit:    1. Impingement syndrome of right shoulder (Primary)  -     XR Shoulder 2+ View Right  -     Large Joint Arthrocentesis: R subacromial bursa        1. Impingement syndrome of right shoulder        I have reviewed my findings with the patient today. We discussed her treatment options including obtaining an MRI for further evaluation or receive a right shoulder injection today. She has elected to proceed with a right subacromial injection today.  MRI will be considered if she has continued symptoms in the future.    Procedure Note:   The potential benefits of performing a therapeutic right shoulder subacromial bursal injection, as well as potential risks (including, but not limited to infection, swelling, pain, bleeding, bruising, nerve/blood vessel damage, skin color changes, transient elevation in blood glucose levels, and fat atrophy) were discussed with the patient.  After informed consent was obtained, a timeout procedure was performed, and the skin on the right shoulder was prepped with chlorhexidine soap and alcohol, after which ethyl chloride was applied to the skin at the injection site. Via the posterior approach, 1 ml of Kenalog 40 mg/ml mixed with 4 ml 0.5% ropivacaine plain was injected into the subacromial bursa.  The patient tolerated the procedure well, experiencing 100% improvement a few minutes following the injection. There were no complications.  Band-Aid was applied to the injection site. Post-procedural instructions were given to the patient and/or their caregiver.    Return in about 6 weeks (around 10/6/2021).      Transcribed from ambient dictation for Medardo Cosme MD by Castillo Steele.  08/25/21   16:28 EDT    I have personally performed the  services described in this document as transcribed by the above individual, and it is both accurate and complete.  Medardo Cosme MD  8/25/2021  17:00 EDT

## 2021-10-05 ENCOUNTER — HOSPITAL ENCOUNTER (OUTPATIENT)
Dept: MAMMOGRAPHY | Facility: HOSPITAL | Age: 66
Discharge: HOME | End: 2021-10-05
Admitting: OBSTETRICS & GYNECOLOGY

## 2021-10-05 DIAGNOSIS — Z12.31 VISIT FOR SCREENING MAMMOGRAM: ICD-10-CM

## 2021-10-05 PROCEDURE — 77063 BREAST TOMOSYNTHESIS BI: CPT

## 2021-10-05 PROCEDURE — 77067 SCR MAMMO BI INCL CAD: CPT

## 2021-10-06 ENCOUNTER — OFFICE VISIT (OUTPATIENT)
Dept: ORTHOPEDIC SURGERY | Facility: CLINIC | Age: 66
End: 2021-10-06

## 2021-10-06 VITALS
SYSTOLIC BLOOD PRESSURE: 152 MMHG | HEIGHT: 65 IN | WEIGHT: 185.4 LBS | HEART RATE: 82 BPM | DIASTOLIC BLOOD PRESSURE: 72 MMHG | BODY MASS INDEX: 30.89 KG/M2

## 2021-10-06 DIAGNOSIS — M75.41 IMPINGEMENT SYNDROME OF RIGHT SHOULDER: Primary | ICD-10-CM

## 2021-10-06 PROCEDURE — 99212 OFFICE O/P EST SF 10 MIN: CPT | Performed by: ORTHOPAEDIC SURGERY

## 2021-10-06 NOTE — PROGRESS NOTES
[facility] [place] Orthopaedic Surgery Clinic Note    Subjective     Chief Complaint   Patient presents with   • Follow-up     6 weeks follow up for Impingement syndrome of right shoulder         HPI    Germaine Jett is a 66 y.o. female who presents for follow-up on her right shoulder. She had a subacromial injection on her last visit on 08/25/2021.    The patient reports that her right shoulder is feeling better after the injection. She rates her pain as a 1 out of 10.  No complaints in her shoulder today.    I have reviewed the following portions of the patient's history and agree with: History of Present Illness and Review of Systems    Patient Active Problem List   Diagnosis   • Palpitations   • Hypertension   • Hyperlipidemia   • Obesity (BMI 30.0-34.9)   • Hypercholesterolemia   • Depression   • Knee osteoarthritis   • Hypothyroid   • Insomnia   • Primary osteoarthritis of right knee   • Status post total right knee replacement   • Leukocytosis, mild, likely reactive   • Acute blood loss anemia, mild, asymptomatic   • Acute postoperative pain     Past Medical History:   Diagnosis Date   • Acute pain of left knee    • Depression     Hx of   • Dyspnea    • Edema    • Hypercholesterolemia    • Hyperlipidemia    • Hypertension    • Hypothyroid      10/14 TSH 5.320;  04/15 TSH 4.580   • Insomnia    • Knee osteoarthritis     RT. meniscal scope, Dr. Alvino Cifuentes, 01/15. removal of ganglion cyst 01/15   • Obesity (BMI 30.0-34.9)     BMI 33.66   • Palpitations    • Wears glasses     READING      Past Surgical History:   Procedure Laterality Date   • APPENDECTOMY     • BLADDER SURGERY     • BREAST BIOPSY Left    • COLONOSCOPY  2017   • GANGLION CYST EXCISION  01/2015    R KNEE   • HYSTERECTOMY     • INCISIONAL BREAST BIOPSY     • KNEE ARTHROSCOPY Right    • OOPHORECTOMY     • TONSILLECTOMY AND ADENOIDECTOMY     • TOTAL ABDOMINAL HYSTERECTOMY WITH SALPINGO OOPHORECTOMY     • TOTAL KNEE ARTHROPLASTY Right 9/26/2019    Procedure:  TOTAL KNEE ARTHROPLASTY RIGHT;  Surgeon: Medardo Cosme MD;  Location: [facility];  Service: Orthopedics      Family History   Problem Relation Age of Onset   • Heart attack Mother         Acute MI   • Hypertension Mother    • Stroke Mother    • Depression Mother    • Heart disease Mother    • Osteoarthritis Mother    • Heart attack Father 63           • Heart disease Father    • Hypertension Father    • Depression Other    • Heart disease Other    • Hypertension Other    • Anxiety disorder Daughter    • Depression Daughter      Social History     Socioeconomic History   • Marital status:      Spouse name: Not on file   • Number of children: 1   • Years of education: Not on file   • Highest education level: Master's degree (e.g., MA, MS, Misty, MEd, MSW, SIMON)   Tobacco Use   • Smoking status: Never Smoker   • Smokeless tobacco: Never Used   Vaping Use   • Vaping Use: Never used   Substance and Sexual Activity   • Alcohol use: No   • Drug use: No   • Sexual activity: Yes     Partners: Male     Birth control/protection: Post-menopausal, Surgical      Current Outpatient Medications on File Prior to Visit   Medication Sig Dispense Refill   • aspirin 81 MG EC tablet Take 1 tablet by mouth Daily. Resume in 1 month     • b complex vitamins tablet Take 1 tablet by mouth Daily. 30 tablet 2   • buPROPion XL (WELLBUTRIN XL) 300 MG 24 hr tablet Take 1 tablet by mouth Daily. 30 tablet 2   • cyanocobalamin 1000 MCG/ML injection INJECT 1 ML INTRAMUSCULARLY ONCE A MONTH AS DIRECTED     • estradiol (ESTRACE) 1 MG tablet Take 1 tablet by mouth Daily. 30 tablet 12   • FLUoxetine (PROzac) 40 MG capsule Take 1 capsule by mouth Daily. 30 capsule 2   • folic acid (FOLVITE) 400 MCG tablet Take 1 tablet by mouth Daily. 30 tablet 2   • hydroCHLOROthiazide (HYDRODIURIL) 12.5 MG tablet      • losartan (COZAAR) 50 MG tablet      • losartan-hydrochlorothiazide (HYZAAR) 50-12.5 MG per tablet Take 1 tablet by mouth Daily.     •  "rosuvastatin (CRESTOR) 10 MG tablet Take 10 mg by mouth Every Night.  0   • traZODone (DESYREL) 50 MG tablet Take 1 tablet by mouth At Night As Needed for Sleep. 30 tablet 2   • vitamin D (ERGOCALCIFEROL) 1.25 MG (48214 UT) capsule capsule TK 1 C PO 1 TIME A WK UTD       No current facility-administered medications on file prior to visit.      Allergies   Allergen Reactions   • Benadryl [Diphenhydramine] Other (See Comments)     SLEEPY   • Adhesive Tape Rash   • Percocet [Oxycodone-Acetaminophen] Other (See Comments)     SEVERE ITCHING IN HEAD        Review of Systems     Objective      Physical Exam  /72   Pulse 82   Ht 165.1 cm (65\")   Wt 84.1 kg (185 lb 6.4 oz)   BMI 30.85 kg/m²     Body mass index is 30.85 kg/m².    General:   Mental Status:  Alert   Appearance: Cooperative, in no acute distress   Build and Nutrition: Well-nourished well-developed female   Orientation: Alert and oriented to person, place and time   Posture: Normal   Gait: Normal    Integument  • Right shoulder: No skin lesions, rash, or ecchymosis.    Upper Extremities  • Right Shoulder:  • Tenderness: None  • Swelling: None  • Range of motion:  • External rotation: 80°  • Forward flexion: 180°  • Abduction: 180°  • Deformities: None  • Functional Testing:  • Drop Arm: Negative  • Lift off: Negative.  • Impingement: Negative  • Infraspinatus intact.    Imaging/Studies  Imaging Results (Last 24 Hours)     ** No results found for the last 24 hours. **            Assessment and Plan     Diagnoses and all orders for this visit:    1. Impingement syndrome of right shoulder (Primary)        1. Impingement syndrome of right shoulder        Plan  I reviewed my findings with the patient today.  Right shoulder responded well to the subacromial injection, and she is doing well today.  No significant pain, and I will see her back if she has any worsening or problems in the future.      Return if symptoms worsen or fail to improve.      Transcribed " from ambient dictation for Medardo Cosme MD by John Cooper.  10/06/21   12:16 EDT    I have personally performed the services described in this document as transcribed by the above individual, and it is both accurate and complete.  Medardo Cosme MD  10/6/2021  12:59 EDT

## 2021-10-25 ENCOUNTER — OFFICE VISIT (OUTPATIENT)
Dept: ORTHOPEDIC SURGERY | Facility: CLINIC | Age: 66
End: 2021-10-25

## 2021-10-25 VITALS
DIASTOLIC BLOOD PRESSURE: 88 MMHG | HEART RATE: 82 BPM | BODY MASS INDEX: 30.89 KG/M2 | WEIGHT: 185.41 LBS | SYSTOLIC BLOOD PRESSURE: 168 MMHG | HEIGHT: 65 IN

## 2021-10-25 DIAGNOSIS — M17.12 PRIMARY OSTEOARTHRITIS OF LEFT KNEE: Primary | ICD-10-CM

## 2021-10-25 PROCEDURE — 20610 DRAIN/INJ JOINT/BURSA W/O US: CPT | Performed by: ORTHOPAEDIC SURGERY

## 2021-10-25 PROCEDURE — 99214 OFFICE O/P EST MOD 30 MIN: CPT | Performed by: ORTHOPAEDIC SURGERY

## 2021-10-25 RX ORDER — ROPIVACAINE HYDROCHLORIDE 5 MG/ML
4 INJECTION, SOLUTION EPIDURAL; INFILTRATION; PERINEURAL
Status: COMPLETED | OUTPATIENT
Start: 2021-10-25 | End: 2021-10-25

## 2021-10-25 RX ORDER — TRIAMCINOLONE ACETONIDE 40 MG/ML
40 INJECTION, SUSPENSION INTRA-ARTICULAR; INTRAMUSCULAR
Status: COMPLETED | OUTPATIENT
Start: 2021-10-25 | End: 2021-10-25

## 2021-10-25 RX ADMIN — ROPIVACAINE HYDROCHLORIDE 4 ML: 5 INJECTION, SOLUTION EPIDURAL; INFILTRATION; PERINEURAL at 14:42

## 2021-10-25 RX ADMIN — TRIAMCINOLONE ACETONIDE 40 MG: 40 INJECTION, SUSPENSION INTRA-ARTICULAR; INTRAMUSCULAR at 14:42

## 2021-10-25 NOTE — PROGRESS NOTES
[facility]    Subjective     Chief Complaint   Patient presents with   • Left Knee - Pain        HPI     She returns to clinic today for new evaluation and treatment of left knee pain. The issue has been ongoing for 6.5 week(s). She rates her pain a 7/10 on the pain scale.Her pain is aching and throbbing. Previous/current treatments: NSAIDS and weight loss. Current symptoms: pain, swelling, stiffness and giving way/buckling. The pain is worse with walking, standing, sitting, climbing stairs, working, rising from seated position and any movement of the joint; resting, heat and lying down improve the pain. Overall, she is doing worse. No history of trauma. Previous injections.    I have reviewed the following portions of the patient's history and agree with: History of Present Illness and Review of Systems    Patient Active Problem List   Diagnosis   • Palpitations   • Hypertension   • Hyperlipidemia   • Obesity (BMI 30.0-34.9)   • Hypercholesterolemia   • Depression   • Knee osteoarthritis   • Hypothyroid   • Insomnia   • Primary osteoarthritis of right knee   • Status post total right knee replacement   • Leukocytosis, mild, likely reactive   • Acute blood loss anemia, mild, asymptomatic   • Acute postoperative pain     Past Medical History:   Diagnosis Date   • Acute pain of left knee    • Depression     Hx of   • Dyspnea    • Edema    • Hypercholesterolemia    • Hyperlipidemia    • Hypertension    • Hypothyroid      10/14 TSH 5.320;  04/15 TSH 4.580   • Insomnia    • Knee osteoarthritis     RT. meniscal scope, Dr. Alvino Cifuentes, 01/15. removal of ganglion cyst 01/15   • Obesity (BMI 30.0-34.9)     BMI 33.66   • Palpitations    • Wears glasses     READING      Past Surgical History:   Procedure Laterality Date   • APPENDECTOMY     • BLADDER SURGERY     • BREAST BIOPSY Left    • COLONOSCOPY  2017   • GANGLION CYST EXCISION  01/2015    R KNEE   • HYSTERECTOMY     • INCISIONAL BREAST  BIOPSY     • KNEE ARTHROSCOPY Right    • OOPHORECTOMY     • TONSILLECTOMY AND ADENOIDECTOMY     • TOTAL ABDOMINAL HYSTERECTOMY WITH SALPINGO OOPHORECTOMY     • TOTAL KNEE ARTHROPLASTY Right 2019    Procedure: TOTAL KNEE ARTHROPLASTY RIGHT;  Surgeon: Medardo Cosme MD;  Location: [facility];  Service: Orthopedics      Family History   Problem Relation Age of Onset   • Heart attack Mother         Acute MI   • Hypertension Mother    • Stroke Mother    • Depression Mother    • Heart disease Mother    • Osteoarthritis Mother    • Heart attack Father 63           • Heart disease Father    • Hypertension Father    • Depression Other    • Heart disease Other    • Hypertension Other    • Anxiety disorder Daughter    • Depression Daughter      Social History     Socioeconomic History   • Marital status:    • Number of children: 1   • Highest education level: Master's degree (e.g., MA, MS, Misty, MEd, MSW, SIMON)   Tobacco Use   • Smoking status: Never Smoker   • Smokeless tobacco: Never Used   Vaping Use   • Vaping Use: Never used   Substance and Sexual Activity   • Alcohol use: No   • Drug use: No   • Sexual activity: Yes     Partners: Male     Birth control/protection: Post-menopausal, Surgical      Current Outpatient Medications on File Prior to Visit   Medication Sig Dispense Refill   • aspirin 81 MG EC tablet Take 1 tablet by mouth Daily. Resume in 1 month     • b complex vitamins tablet Take 1 tablet by mouth Daily. 30 tablet 2   • buPROPion XL (WELLBUTRIN XL) 300 MG 24 hr tablet Take 1 tablet by mouth Daily. 30 tablet 2   • cyanocobalamin 1000 MCG/ML injection INJECT 1 ML INTRAMUSCULARLY ONCE A MONTH AS DIRECTED     • estradiol (ESTRACE) 1 MG tablet Take 1 tablet by mouth Daily. 30 tablet 12   • FLUoxetine (PROzac) 40 MG capsule Take 1 capsule by mouth Daily. 30 capsule 2   • folic acid (FOLVITE) 400 MCG tablet Take 1 tablet by mouth Daily. 30 tablet 2   • hydroCHLOROthiazide (HYDRODIURIL) 12.5 MG tablet   "    • losartan (COZAAR) 50 MG tablet      • losartan-hydrochlorothiazide (HYZAAR) 50-12.5 MG per tablet Take 1 tablet by mouth Daily.     • rosuvastatin (CRESTOR) 10 MG tablet Take 10 mg by mouth Every Night.  0   • traZODone (DESYREL) 50 MG tablet Take 1 tablet by mouth At Night As Needed for Sleep. 30 tablet 2   • vitamin D (ERGOCALCIFEROL) 1.25 MG (19397 UT) capsule capsule TK 1 C PO 1 TIME A WK UTD       No current facility-administered medications on file prior to visit.      Allergies   Allergen Reactions   • Benadryl [Diphenhydramine] Other (See Comments)     SLEEPY   • Adhesive Tape Rash   • Percocet [Oxycodone-Acetaminophen] Other (See Comments)     SEVERE ITCHING IN HEAD        Review of Systems     Objective      Physical Exam  /88   Pulse 82   Ht 165.1 cm (65\")   Wt 84.1 kg (185 lb 6.5 oz)   BMI 30.85 kg/m²     Body mass index is 30.85 kg/m².    General:   Mental Status:  Alert   Appearance: Cooperative, in no acute distress   Build and Nutrition: Well-nourished well-developed female   Orientation: Alert and oriented to person, place and time   Posture: Normal   Gait: Mild limp on the left    Integument:   Left knee: No skin lesions, no rash, no ecchymosis    Lower Extremities:   Left Knee:    Tenderness:  Medial joint line tenderness    Effusion:  None    Swelling:  None    Crepitus: Soft    Range of motion:  Extension: 0°       Flexion: 130°  Instability: No varus laxity, no valgus laxity, negative anterior drawer  Deformities:  None      Imaging/Studies  Imaging Results (Last 24 Hours)     Procedure Component Value Units Date/Time    XR Knee 4+ View Left [[id number]] Resulted: 10/25/21 1410     Updated: 10/25/21 1411    Narrative:      Left Knee Radiographs  Indication: left knee pain  Views: Standing AP's and skiers of both knees, with lateral and sunrise   views of the left knee    Comparison: no prior studies available    Findings:   Medial joint space narrowing, no acute bony " abnormalities.  Mild   patellofemoral degeneration.  No unusual bony features.            Assessment and Plan     Diagnoses and all orders for this visit:    1. Primary osteoarthritis of left knee (Primary)  -     XR Knee 4+ View Left  -     Large Joint Arthrocentesis: L knee        1. Primary osteoarthritis of left knee        I reviewed my findings with the patient. She does have mild to moderate degeneration in the left knee, and would like to try an intra-articular injection. This was provided today. I will see her back in 3 months, but sooner for any problems.    Procedure Note:  The potential benefits of performing a therapeutic left knee joint injection, as well as potential risks (including, but not limited to infection, swelling, pain, bleeding, bruising, nerve/blood vessel damage, skin color changes, transient elevation in blood glucose levels, and fat atrophy) were discussed with the patient.  After informed consent, timeout procedure was performed, and the skin on the left knee was prepped with chlorhexidine soap and alcohol, after which ethyl chloride was applied to the skin at the injection site. Via the anterolateral approach, 1ml of Kenalog 40mg/ml mixed with 4ml 0.5% ropivacaine plain was injected into the knee joint.  The patient tolerated the procedure well, experiencing 90% improvement a few minutes following the injection. There were no complications.  Band-Aid was applied to the injection site. Post-procedural instructions were given to the patient and/or their caregiver.      Return in about 3 months (around 1/25/2022).      Medardo Cosme MD  10/25/21  15:01 EDT

## 2021-10-25 NOTE — PROGRESS NOTES
Procedure   Large Joint Arthrocentesis: L knee  Date/Time: 10/25/2021 2:42 PM  Consent given by: patient  Site marked: site marked  Timeout: Immediately prior to procedure a time out was called to verify the correct patient, procedure, equipment, support staff and site/side marked as required   Supporting Documentation  Indications: pain   Procedure Details  Location: knee - L knee  Preparation: Patient was prepped and draped in the usual sterile fashion  Needle size: 22 G  Approach: anterolateral  Medications administered: 4 mL ropivacaine 0.5 %; 40 mg triamcinolone acetonide 40 MG/ML  Patient tolerance: patient tolerated the procedure well with no immediate complications

## 2022-02-19 DIAGNOSIS — E66.09 CLASS 1 OBESITY DUE TO EXCESS CALORIES WITHOUT SERIOUS COMORBIDITY WITH BODY MASS INDEX (BMI) OF 31.0 TO 31.9 IN ADULT: ICD-10-CM

## 2022-02-19 DIAGNOSIS — F33.41 RECURRENT MAJOR DEPRESSIVE DISORDER, IN PARTIAL REMISSION: ICD-10-CM

## 2022-02-19 DIAGNOSIS — F41.1 GENERALIZED ANXIETY DISORDER: ICD-10-CM

## 2022-02-21 RX ORDER — BUPROPION HYDROCHLORIDE 300 MG/1
300 TABLET ORAL DAILY
Qty: 30 TABLET | Refills: 2 | Status: SHIPPED | OUTPATIENT
Start: 2022-02-21 | End: 2022-04-05 | Stop reason: SDUPTHER

## 2022-04-05 ENCOUNTER — OFFICE VISIT (OUTPATIENT)
Dept: PSYCHIATRY | Facility: CLINIC | Age: 67
End: 2022-04-05

## 2022-04-05 VITALS
HEART RATE: 76 BPM | WEIGHT: 196.2 LBS | DIASTOLIC BLOOD PRESSURE: 92 MMHG | SYSTOLIC BLOOD PRESSURE: 149 MMHG | BODY MASS INDEX: 32.69 KG/M2 | HEIGHT: 65 IN

## 2022-04-05 DIAGNOSIS — F33.41 RECURRENT MAJOR DEPRESSIVE DISORDER, IN PARTIAL REMISSION: ICD-10-CM

## 2022-04-05 DIAGNOSIS — F41.1 GENERALIZED ANXIETY DISORDER: ICD-10-CM

## 2022-04-05 DIAGNOSIS — E66.09 CLASS 1 OBESITY DUE TO EXCESS CALORIES WITHOUT SERIOUS COMORBIDITY WITH BODY MASS INDEX (BMI) OF 32.0 TO 32.9 IN ADULT: ICD-10-CM

## 2022-04-05 PROCEDURE — 99214 OFFICE O/P EST MOD 30 MIN: CPT | Performed by: PSYCHIATRY & NEUROLOGY

## 2022-04-05 RX ORDER — BUPROPION HYDROCHLORIDE 300 MG/1
300 TABLET ORAL DAILY
Qty: 90 TABLET | Refills: 0 | Status: SHIPPED | OUTPATIENT
Start: 2022-04-05 | End: 2022-10-26 | Stop reason: SDUPTHER

## 2022-04-05 RX ORDER — FLUOXETINE HYDROCHLORIDE 40 MG/1
40 CAPSULE ORAL DAILY
Qty: 90 CAPSULE | Refills: 0 | Status: SHIPPED | OUTPATIENT
Start: 2022-04-05 | End: 2022-07-11

## 2022-04-05 NOTE — PROGRESS NOTES
Subjective   Germaine Jett is a 66 y.o. female who presents today for follow up    Chief Complaint:  Depression and Anxiety      History of Present Illness: Patient presenting today for follow-up.  She reports that since last visit she continues to be stable and feels that mood and anxiety symptoms are well controlled.  She is not having any major medication side effects.  She reports that sleep is improved and she no longer requires trazodone for insomnia.  She continues to help with her grandchildren.  She denies SI/HI/AVH.    The following portions of the patient's history were reviewed and updated as appropriate: allergies, current medications, past family history, past medical history, past social history, past surgical history and problem list.      Past Medical History:  Past Medical History:   Diagnosis Date   • Acute pain of left knee    • Depression     Hx of   • Dyspnea    • Edema    • Hypercholesterolemia    • Hyperlipidemia    • Hypertension    • Hypothyroid      10/14 TSH 5.320;  04/15 TSH 4.580   • Insomnia    • Knee osteoarthritis     RT. meniscal scope, Dr. Alvino Cifuentes, 01/15. removal of ganglion cyst 01/15   • Obesity (BMI 30.0-34.9)     BMI 33.66   • Palpitations    • Wears glasses     READING       Social History:  Social History     Socioeconomic History   • Marital status:    • Number of children: 1   • Highest education level: Master's degree (e.g., MA, MS, Misty, MEd, MSW, SIMON)   Tobacco Use   • Smoking status: Never Smoker   • Smokeless tobacco: Never Used   Vaping Use   • Vaping Use: Never used   Substance and Sexual Activity   • Alcohol use: No   • Drug use: No   • Sexual activity: Yes     Partners: Male     Birth control/protection: Post-menopausal, Surgical       Family History:  Family History   Problem Relation Age of Onset   • Heart attack Mother         Acute MI   • Hypertension Mother    • Stroke Mother    • Depression Mother    • Heart disease Mother    • Osteoarthritis  Mother    • Heart attack Father 63           • Heart disease Father    • Hypertension Father    • Depression Other    • Heart disease Other    • Hypertension Other    • Anxiety disorder Daughter    • Depression Daughter        Past Surgical History:  Past Surgical History:   Procedure Laterality Date   • APPENDECTOMY     • BLADDER SURGERY     • BREAST BIOPSY Left    • COLONOSCOPY  2017   • GANGLION CYST EXCISION  2015    R KNEE   • HYSTERECTOMY     • INCISIONAL BREAST BIOPSY     • KNEE ARTHROSCOPY Right    • OOPHORECTOMY     • TONSILLECTOMY AND ADENOIDECTOMY     • TOTAL ABDOMINAL HYSTERECTOMY WITH SALPINGO OOPHORECTOMY     • TOTAL KNEE ARTHROPLASTY Right 2019    Procedure: TOTAL KNEE ARTHROPLASTY RIGHT;  Surgeon: Medardo Cosme MD;  Location: [facility];  Service: Orthopedics       Problem List:  Patient Active Problem List   Diagnosis   • Palpitations   • Hypertension   • Hyperlipidemia   • Obesity (BMI 30.0-34.9)   • Hypercholesterolemia   • Depression   • Knee osteoarthritis   • Hypothyroid   • Insomnia   • Primary osteoarthritis of right knee   • Status post total right knee replacement   • Leukocytosis, mild, likely reactive   • Acute blood loss anemia, mild, asymptomatic   • Acute postoperative pain       Allergy:   Allergies   Allergen Reactions   • Benadryl [Diphenhydramine] Other (See Comments)     SLEEPY   • Adhesive Tape Rash   • Percocet [Oxycodone-Acetaminophen] Other (See Comments)     SEVERE ITCHING IN HEAD        Current Medications:   Current Outpatient Medications   Medication Sig Dispense Refill   • aspirin 81 MG EC tablet Take 1 tablet by mouth Daily. Resume in 1 month     • buPROPion XL (WELLBUTRIN XL) 300 MG 24 hr tablet Take 1 tablet by mouth Daily. 90 tablet 0   • estradiol (ESTRACE) 1 MG tablet Take 1 tablet by mouth Daily. 30 tablet 12   • FLUoxetine (PROzac) 40 MG capsule Take 1 capsule by mouth Daily. 90 capsule 0   • hydroCHLOROthiazide (HYDRODIURIL) 12.5 MG tablet      •  "losartan (COZAAR) 50 MG tablet      • losartan-hydrochlorothiazide (HYZAAR) 50-12.5 MG per tablet Take 1 tablet by mouth Daily.     • rosuvastatin (CRESTOR) 10 MG tablet Take 10 mg by mouth Every Night.  0   • vitamin D (ERGOCALCIFEROL) 1.25 MG (40010 UT) capsule capsule TK 1 C PO 1 TIME A WK UTD       No current facility-administered medications for this visit.       Review of Symptoms:    Review of Systems   Constitutional: Negative for activity change (improved) and fatigue.   HENT: Negative.    Respiratory: Negative.    Cardiovascular: Negative.    Gastrointestinal: Negative.    Neurological: Negative.    Psychiatric/Behavioral: Negative for agitation, behavioral problems, dysphoric mood, sleep disturbance and stress. The patient is not nervous/anxious.          Physical Exam:   Blood pressure 149/92, pulse 76, height 165.1 cm (65\"), weight 89 kg (196 lb 3.2 oz), not currently breastfeeding.      Appearance:  female stated age in no acute distress  Gait, Station, Strength: WNL    Mental Status Exam:     Mental Status exam performed 04/05/2022  and patient shows no significant changes from previous exam.     Hygiene:   good  Cooperation:  Cooperative  Eye Contact:  Good  Psychomotor Behavior:  Appropriate  Affect:  Full range  Mood: normal, stable   Hopelessness: Denies  Speech:  Normal  Thought Process:  Goal directed and Linear  Thought Content:  Normal and Mood congruent  Suicidal:  None  Homicidal:  None  Hallucinations:  None  Delusion:  None  Memory:  Intact  Orientation:  Person, Place, Time and Situation  Reliability:  good  Insight:  Good  Judgement:  Good  Impulse Control:  Good  Physical/Medical Issues:  No        Lab Results:   No visits with results within 1 Month(s) from this visit.   Latest known visit with results is:   Office Visit on 12/30/2020   Component Date Value Ref Range Status   • External Amphetamine Screen Urine 12/30/2020 Negative   Final   • Amphetamine Cut-Off 12/30/2020 " 1000ng/ml   Final   • External Benzodiazepine Screen Uri* 12/30/2020 Negative   Final   • Benzodiazipine Cut-Off 12/30/2020 300ng/ml   Final   • External Cocaine Screen Urine 12/30/2020 Negative   Final   • Cocaine Cut-Off 12/30/2020 300ng/ml   Final   • External THC Screen Urine 12/30/2020 Negative   Final   • THC Cut-Off 12/30/2020 50ng/ml   Final   • External Methadone Screen Urine 12/30/2020 Negative   Final   • Methadone Cut-Off 12/30/2020 300ng/ml   Final   • External Methamphetamine Screen Ur* 12/30/2020 Negative   Final   • Methamphetamine Cut-Off 12/30/2020 1000ng/ml   Final   • External Oxycodone Screen Urine 12/30/2020 Negative   Final   • Oxycodone Cut-Off 12/30/2020 100ng/ml   Final   • External Buprenorphine Screen Urine 12/30/2020 Negative   Final   • Buprenorphine Cut-Off 12/30/2020 10ng/ml   Final   • External MDMA 12/30/2020 Negative   Final   • MDMA Cut-Off 12/30/2020 500ng/ml   Final   • External Opiates Screen Urine 12/30/2020 Negative   Final   • Opiates Cut-Off 12/30/2020 300ng/ml   Final       Assessment/Plan   Diagnoses and all orders for this visit:    1. Recurrent major depressive disorder, in partial remission (HCC)  -     FLUoxetine (PROzac) 40 MG capsule; Take 1 capsule by mouth Daily.  Dispense: 90 capsule; Refill: 0  -     buPROPion XL (WELLBUTRIN XL) 300 MG 24 hr tablet; Take 1 tablet by mouth Daily.  Dispense: 90 tablet; Refill: 0    2. Generalized anxiety disorder  -     FLUoxetine (PROzac) 40 MG capsule; Take 1 capsule by mouth Daily.  Dispense: 90 capsule; Refill: 0  -     buPROPion XL (WELLBUTRIN XL) 300 MG 24 hr tablet; Take 1 tablet by mouth Daily.  Dispense: 90 tablet; Refill: 0    3. Class 1 obesity due to excess calories without serious comorbidity with body mass index (BMI) of 32.0 to 32.9 in adult  -     buPROPion XL (WELLBUTRIN XL) 300 MG 24 hr tablet; Take 1 tablet by mouth Daily.  Dispense: 90 tablet; Refill: 0    -Patient continues to do well and is stable based on no  major symptom burden.  She feels that mood and anxiety symptoms are well controlled.  Her insomnia has improved so she no longer requires trazodone for sleep.  She has had some minor weight gain recently.  -Reviewed previous available documentation  -Reviewed most recent available labs   -Continue Prozac 40 mg p.o. daily for mood and anxiety  -Continue Wellbutrin  mg p.o. daily for mood and anxiety as well as weight  -Discontinue trazodone, insomnia improved  -Obesity complicates all aspects of care  -Encouraged her continued healthy eating practices and physical activity as she has had some successful weight loss  -Encouraged to consider therapy.      Visit Diagnoses:    ICD-10-CM ICD-9-CM   1. Recurrent major depressive disorder, in partial remission (HCC)  F33.41 296.35   2. Generalized anxiety disorder  F41.1 300.02   3. Class 1 obesity due to excess calories without serious comorbidity with body mass index (BMI) of 32.0 to 32.9 in adult  E66.09 278.00    Z68.32 V85.32       TREATMENT PLAN/GOALS: Continue supportive psychotherapy efforts and medications as indicated. Treatment and medication options discussed during today's visit. Patient acknowledged and verbally consented to continue with current treatment plan and was educated on the importance of compliance with treatment and follow-up appointments.    MEDICATION ISSUES:    Discussed medication options and treatment plan of prescribed medication as well as the risks, benefits, and side effects including potential falls, possible impaired driving and metabolic adversities among others. Patient is agreeable to call the office with any worsening of symptoms or onset of side effects. Patient is agreeable to call 911 or go to the nearest ER should he/she begin having SI/HI.     MEDS ORDERED DURING VISIT:  New Medications Ordered This Visit   Medications   • FLUoxetine (PROzac) 40 MG capsule     Sig: Take 1 capsule by mouth Daily.     Dispense:  90 capsule      Refill:  0   • buPROPion XL (WELLBUTRIN XL) 300 MG 24 hr tablet     Sig: Take 1 tablet by mouth Daily.     Dispense:  90 tablet     Refill:  0       Return in about 3 months (around 7/5/2022).             This document has been electronically signed by Sim Knight MD  April 5, 2022 12:44 EDT

## 2022-04-11 ENCOUNTER — OFFICE VISIT (OUTPATIENT)
Dept: ORTHOPEDIC SURGERY | Facility: CLINIC | Age: 67
End: 2022-04-11

## 2022-04-11 VITALS
WEIGHT: 196.21 LBS | BODY MASS INDEX: 32.69 KG/M2 | HEIGHT: 65 IN | DIASTOLIC BLOOD PRESSURE: 74 MMHG | SYSTOLIC BLOOD PRESSURE: 130 MMHG

## 2022-04-11 DIAGNOSIS — M75.41 IMPINGEMENT SYNDROME OF RIGHT SHOULDER: Primary | ICD-10-CM

## 2022-04-11 PROCEDURE — 20610 DRAIN/INJ JOINT/BURSA W/O US: CPT | Performed by: ORTHOPAEDIC SURGERY

## 2022-04-11 RX ORDER — TRIAMCINOLONE ACETONIDE 40 MG/ML
40 INJECTION, SUSPENSION INTRA-ARTICULAR; INTRAMUSCULAR
Status: COMPLETED | OUTPATIENT
Start: 2022-04-11 | End: 2022-04-11

## 2022-04-11 RX ORDER — ROPIVACAINE HYDROCHLORIDE 5 MG/ML
4 INJECTION, SOLUTION EPIDURAL; INFILTRATION; PERINEURAL
Status: COMPLETED | OUTPATIENT
Start: 2022-04-11 | End: 2022-04-11

## 2022-04-11 RX ADMIN — TRIAMCINOLONE ACETONIDE 40 MG: 40 INJECTION, SUSPENSION INTRA-ARTICULAR; INTRAMUSCULAR at 13:48

## 2022-04-11 RX ADMIN — ROPIVACAINE HYDROCHLORIDE 4 ML: 5 INJECTION, SOLUTION EPIDURAL; INFILTRATION; PERINEURAL at 13:48

## 2022-04-11 NOTE — PROGRESS NOTES
[facility] Note    Subjective     Chief Complaint   Patient presents with   • Follow-up     6 months- Impingement syndrome of right shoulder         HPI    It has been 6  month(s) since Ms. Jett's last visit. She returns to clinic today for follow-up of right shoulder pain. The issue has been ongoing for 2 year(s). She rates her pain a 7/10 on the pain scale. Previous/current treatments: NSAIDS, cortisone injection. Current symptoms: pain. The pain is worse with working and lying on affected side; pain medication and/or NSAID improve the pain. Overall, she is doing the same.  She had a previous subacromial injection with relief, back in August 2021.    I have reviewed the following portions of the patient's history and agree with: History of Present Illness and Review of Systems    Patient Active Problem List   Diagnosis   • Palpitations   • Hypertension   • Hyperlipidemia   • Obesity (BMI 30.0-34.9)   • Hypercholesterolemia   • Depression   • Knee osteoarthritis   • Hypothyroid   • Insomnia   • Primary osteoarthritis of right knee   • Status post total right knee replacement   • Leukocytosis, mild, likely reactive   • Acute blood loss anemia, mild, asymptomatic   • Acute postoperative pain     Past Medical History:   Diagnosis Date   • Acute pain of left knee    • Depression     Hx of   • Dyspnea    • Edema    • Hypercholesterolemia    • Hyperlipidemia    • Hypertension    • Hypothyroid      10/14 TSH 5.320;  04/15 TSH 4.580   • Insomnia    • Knee osteoarthritis     RT. meniscal scope, Dr. Alvino Cifuentes, 01/15. removal of ganglion cyst 01/15   • Obesity (BMI 30.0-34.9)     BMI 33.66   • Palpitations    • Wears glasses     READING      Past Surgical History:   Procedure Laterality Date   • APPENDECTOMY     • BLADDER SURGERY     • BREAST BIOPSY Left    • COLONOSCOPY  2017   • GANGLION CYST EXCISION  01/2015    R KNEE   • HYSTERECTOMY     • INCISIONAL BREAST BIOPSY     • KNEE ARTHROSCOPY Right     • OOPHORECTOMY     • TONSILLECTOMY AND ADENOIDECTOMY     • TOTAL ABDOMINAL HYSTERECTOMY WITH SALPINGO OOPHORECTOMY     • TOTAL KNEE ARTHROPLASTY Right 2019    Procedure: TOTAL KNEE ARTHROPLASTY RIGHT;  Surgeon: Medardo Cosme MD;  Location: [facility];  Service: Orthopedics      Family History   Problem Relation Age of Onset   • Heart attack Mother         Acute MI   • Hypertension Mother    • Stroke Mother    • Depression Mother    • Heart disease Mother    • Osteoarthritis Mother    • Heart attack Father 63           • Heart disease Father    • Hypertension Father    • Depression Other    • Heart disease Other    • Hypertension Other    • Anxiety disorder Daughter    • Depression Daughter      Social History     Socioeconomic History   • Marital status:    • Number of children: 1   • Highest education level: Master's degree (e.g., MA, MS, Misty, MEd, MSW, SIMON)   Tobacco Use   • Smoking status: Never Smoker   • Smokeless tobacco: Never Used   Vaping Use   • Vaping Use: Never used   Substance and Sexual Activity   • Alcohol use: No   • Drug use: No   • Sexual activity: Yes     Partners: Male     Birth control/protection: Post-menopausal, Surgical      Current Outpatient Medications on File Prior to Visit   Medication Sig Dispense Refill   • aspirin 81 MG EC tablet Take 1 tablet by mouth Daily. Resume in 1 month     • buPROPion XL (WELLBUTRIN XL) 300 MG 24 hr tablet Take 1 tablet by mouth Daily. 90 tablet 0   • estradiol (ESTRACE) 1 MG tablet Take 1 tablet by mouth Daily. 30 tablet 12   • FLUoxetine (PROzac) 40 MG capsule Take 1 capsule by mouth Daily. 90 capsule 0   • hydroCHLOROthiazide (HYDRODIURIL) 12.5 MG tablet      • losartan (COZAAR) 50 MG tablet      • losartan-hydrochlorothiazide (HYZAAR) 50-12.5 MG per tablet Take 1 tablet by mouth Daily.     • rosuvastatin (CRESTOR) 10 MG tablet Take 10 mg by mouth Every Night.  0   • vitamin D (ERGOCALCIFEROL) 1.25 MG (06690 UT) capsule capsule TK 1 C  PO 1 TIME A WK UTD       No current facility-administered medications on file prior to visit.      Allergies   Allergen Reactions   • Benadryl [Diphenhydramine] Other (See Comments)     SLEEPY   • Adhesive Tape Rash   • Percocet [Oxycodone-Acetaminophen] Other (See Comments)     SEVERE ITCHING IN HEAD        Review of Systems   Constitutional: Negative for activity change, appetite change, chills, diaphoresis, fatigue, fever and unexpected weight change.   HENT: Negative for congestion, dental problem, drooling, ear discharge, ear pain, facial swelling, hearing loss, mouth sores, nosebleeds, postnasal drip, rhinorrhea, sinus pressure, sneezing, sore throat, tinnitus, trouble swallowing and voice change.    Eyes: Negative for photophobia, pain, discharge, redness, itching and visual disturbance.   Respiratory: Negative for apnea, cough, choking, chest tightness, shortness of breath, wheezing and stridor.    Cardiovascular: Negative for chest pain, palpitations and leg swelling.   Gastrointestinal: Negative for abdominal distention, abdominal pain, anal bleeding, blood in stool, constipation, diarrhea, nausea, rectal pain and vomiting.   Endocrine: Negative for cold intolerance, heat intolerance, polydipsia, polyphagia and polyuria.   Genitourinary: Negative for decreased urine volume, difficulty urinating, dysuria, enuresis, flank pain, frequency, genital sores, hematuria and urgency.   Musculoskeletal: Positive for arthralgias. Negative for back pain, gait problem, joint swelling, myalgias, neck pain and neck stiffness.   Skin: Negative for color change, pallor, rash and wound.   Allergic/Immunologic: Negative for environmental allergies, food allergies and immunocompromised state.   Neurological: Negative for dizziness, tremors, seizures, syncope, facial asymmetry, speech difficulty, weakness, light-headedness, numbness and headaches.   Hematological: Negative for adenopathy. Does not bruise/bleed easily.  "  Psychiatric/Behavioral: Negative for agitation, behavioral problems, confusion, decreased concentration, dysphoric mood, hallucinations, self-injury, sleep disturbance and suicidal ideas. The patient is not nervous/anxious and is not hyperactive.         Objective      Physical Exam  /74   Ht 165.1 cm (65\")   Wt 89 kg (196 lb 3.4 oz)   BMI 32.65 kg/m²     Body mass index is 32.65 kg/m².    General:   Mental Status:  Alert   Appearance: Cooperative, in no acute distress   Build and Nutrition: Well-nourished well-developed female   Orientation: Alert and oriented to person, place and time   Posture: Normal   Gait: Nonantalgic    Integument:   Right shoulder: No skin lesions, no rash, no ecchymosis    Upper Extremities:   Right Shoulder:    Tenderness:  None    Swelling:  None    Range of motion:  External rotation:  70°       Forward flexion:  170°       Abduction:   160°  Deformities:  None  Functional testing: Negative drop arm, negative lift-off, mildly positive impingement        Imaging/Studies  Imaging Results (Last 24 Hours)     ** No results found for the last 24 hours. **        No new imaging today    Assessment and Plan     Diagnoses and all orders for this visit:    1. Impingement syndrome of right shoulder (Primary)  -     - Large Joint Arthrocentesis: R subacromial bursa        1. Impingement syndrome of right shoulder        I reviewed my findings with the patient.  She has a history of impingement of the left shoulder, and responded to an injection several months ago, and would like to have another injection today.  As she is experiencing return of pain.  Injection was provided.  I will see her back in 3 months, but sooner for any problems.  If she has persistent pain, we may consider further imaging with MRI.    Procedure Note:  The potential benefits of performing a therapeutic right shoulder subacromial bursal injection, as well as potential risks (including, but not limited to infection, " swelling, pain, bleeding, bruising, nerve/blood vessel damage, skin color changes, transient elevation in blood glucose levels, and fat atrophy) were discussed with the patient.  After informed consent, timeout procedure was performed, and the skin on the right shoulder was prepped with chlorhexidine soap and alcohol, after which ethyl chloride was applied to the skin at the injection site. Via the posterior approach, 1ml of Kenalog 40mg/ml mixed with 4ml 0.5% ropivacaine plain was injected into the subacromial bursa.  The patient tolerated the procedure well, experiencing 100% improvement a few minutes following the injection. There were no complications.  Band-Aid was applied to the injection site. Post-procedural instructions were given to the patient and/or their caregiver.    Return in about 3 months (around 7/11/2022).      Medardo Cosme MD  04/11/22  14:02 EDT

## 2022-04-11 NOTE — PROGRESS NOTES
Procedure   - Large Joint Arthrocentesis: R subacromial bursa on 4/11/2022 1:48 PM  Indications: pain  Details: 22 G needle, posterior approach  Medications: 4 mL ropivacaine 0.5 %; 40 mg triamcinolone acetonide 40 MG/ML  Outcome: tolerated well, no immediate complications  Procedure, treatment alternatives, risks and benefits explained, specific risks discussed. Consent was given by the patient. Immediately prior to procedure a time out was called to verify the correct patient, procedure, equipment, support staff and site/side marked as required. Patient was prepped and draped in the usual sterile fashion.

## 2022-07-11 DIAGNOSIS — F41.1 GENERALIZED ANXIETY DISORDER: ICD-10-CM

## 2022-07-11 DIAGNOSIS — F33.41 RECURRENT MAJOR DEPRESSIVE DISORDER, IN PARTIAL REMISSION: ICD-10-CM

## 2022-07-11 RX ORDER — FLUOXETINE HYDROCHLORIDE 40 MG/1
40 CAPSULE ORAL DAILY
Qty: 90 CAPSULE | Refills: 0 | Status: SHIPPED | OUTPATIENT
Start: 2022-07-11 | End: 2022-10-26 | Stop reason: SDUPTHER

## 2022-08-04 ENCOUNTER — TRANSCRIBE ORDERS (OUTPATIENT)
Dept: OBSTETRICS AND GYNECOLOGY | Facility: CLINIC | Age: 67
End: 2022-08-04

## 2022-08-04 DIAGNOSIS — Z12.31 VISIT FOR SCREENING MAMMOGRAM: Primary | ICD-10-CM

## 2022-08-17 ENCOUNTER — OFFICE VISIT (OUTPATIENT)
Dept: ORTHOPEDIC SURGERY | Facility: CLINIC | Age: 67
End: 2022-08-17

## 2022-08-17 VITALS
HEIGHT: 65 IN | DIASTOLIC BLOOD PRESSURE: 82 MMHG | BODY MASS INDEX: 31.56 KG/M2 | SYSTOLIC BLOOD PRESSURE: 124 MMHG | WEIGHT: 189.4 LBS

## 2022-08-17 DIAGNOSIS — M75.41 IMPINGEMENT SYNDROME OF RIGHT SHOULDER: Primary | ICD-10-CM

## 2022-08-17 PROCEDURE — 99212 OFFICE O/P EST SF 10 MIN: CPT | Performed by: ORTHOPAEDIC SURGERY

## 2022-08-17 RX ORDER — DICLOFENAC SODIUM 75 MG/1
TABLET, DELAYED RELEASE ORAL
COMMUNITY
Start: 2022-06-20 | End: 2022-10-26

## 2022-08-17 NOTE — PROGRESS NOTES
[facility] [place] [facility] Note    Subjective     Chief Complaint   Patient presents with   • Follow-up     Right Shoulder Pain-4/11/22 Last SA Bursa Injection        HPI    It has been 4  month(s) since Ms. Jett's last visit. She returns to clinic today for follow-up of right shoulder pain. The issue has been ongoing for 2 year(s). She rates her pain a 3/10 on the pain scale. Previous/current treatments: NSAIDS. Current symptoms: same as prior visit. The pain is worse with working and lying on affected side; resting and pain medication and/or NSAID improve the pain. Overall, she is doing better.  Her shoulder feels much better.  No complaints today.    I have reviewed the following portions of the patient's history and agree with: History of Present Illness and Review of Systems    Patient Active Problem List   Diagnosis   • Palpitations   • Hypertension   • Hyperlipidemia   • Obesity (BMI 30.0-34.9)   • Hypercholesterolemia   • Depression   • Knee osteoarthritis   • Hypothyroid   • Insomnia   • Primary osteoarthritis of right knee   • Status post total right knee replacement   • Leukocytosis, mild, likely reactive   • Acute blood loss anemia, mild, asymptomatic   • Acute postoperative pain     Past Medical History:   Diagnosis Date   • Acute pain of left knee    • Depression     Hx of   • Dyspnea    • Edema    • Hypercholesterolemia    • Hyperlipidemia    • Hypertension    • Hypothyroid      10/14 TSH 5.320;  04/15 TSH 4.580   • Insomnia    • Knee osteoarthritis     RT. meniscal scope, Dr. Alvino Cifuentes, 01/15. removal of ganglion cyst 01/15   • Obesity (BMI 30.0-34.9)     BMI 33.66   • Palpitations    • Periarthritis of shoulder 2020   • Wears glasses     READING      Past Surgical History:   Procedure Laterality Date   • APPENDECTOMY     • BLADDER SURGERY     • BREAST BIOPSY Left    • COLONOSCOPY  2017   • GANGLION CYST EXCISION  01/2015    R KNEE   • HYSTERECTOMY     • INCISIONAL BREAST BIOPSY     •  JOINT REPLACEMENT     • KNEE ARTHROSCOPY Right    • OOPHORECTOMY     • TONSILLECTOMY AND ADENOIDECTOMY     • TOTAL ABDOMINAL HYSTERECTOMY WITH SALPINGO OOPHORECTOMY     • TOTAL KNEE ARTHROPLASTY Right 2019    Procedure: TOTAL KNEE ARTHROPLASTY RIGHT;  Surgeon: Medardo Cosme MD;  Location: [facility];  Service: Orthopedics      Family History   Problem Relation Age of Onset   • Heart attack Mother         Acute MI   • Hypertension Mother    • Stroke Mother    • Depression Mother    • Heart disease Mother    • Osteoarthritis Mother    • Osteoporosis Mother    • Heart attack Father 63           • Heart disease Father    • Hypertension Father    • Depression Other    • Heart disease Other    • Hypertension Other    • Anxiety disorder Daughter    • Depression Daughter      Social History     Socioeconomic History   • Marital status:    • Number of children: 1   • Highest education level: Master's degree (e.g., MA, MS, Misty, MEd, MSW, SIMON)   Tobacco Use   • Smoking status: Never Smoker   • Smokeless tobacco: Never Used   Vaping Use   • Vaping Use: Never used   Substance and Sexual Activity   • Alcohol use: No   • Drug use: No   • Sexual activity: Yes     Partners: Male     Birth control/protection: Surgical, Post-menopausal      Current Outpatient Medications on File Prior to Visit   Medication Sig Dispense Refill   • aspirin 81 MG EC tablet Take 1 tablet by mouth Daily. Resume in 1 month     • buPROPion XL (WELLBUTRIN XL) 300 MG 24 hr tablet Take 1 tablet by mouth Daily. 90 tablet 0   • diclofenac (VOLTAREN) 75 MG EC tablet TAKE 1 TABLET BY MOUTH TWICE DAILY FOR PAIN AND INFLAMMATION     • estradiol (ESTRACE) 1 MG tablet Take 1 tablet by mouth Daily. 30 tablet 12   • FLUoxetine (PROzac) 40 MG capsule TAKE 1 CAPSULE BY MOUTH DAILY 90 capsule 0   • hydroCHLOROthiazide (HYDRODIURIL) 12.5 MG tablet      • losartan (COZAAR) 50 MG tablet      • losartan-hydrochlorothiazide (HYZAAR) 50-12.5 MG per tablet Take  1 tablet by mouth Daily.     • rosuvastatin (CRESTOR) 10 MG tablet Take 10 mg by mouth Every Night.  0   • vitamin D (ERGOCALCIFEROL) 1.25 MG (47804 UT) capsule capsule TK 1 C PO 1 TIME A WK UTD       No current facility-administered medications on file prior to visit.      Allergies   Allergen Reactions   • Benadryl [Diphenhydramine] Other (See Comments)     SLEEPY   • Adhesive Tape Rash   • Percocet [Oxycodone-Acetaminophen] Other (See Comments)     SEVERE ITCHING IN HEAD        Review of Systems   Constitutional: Negative for activity change, appetite change, chills, diaphoresis, fatigue, fever and unexpected weight change.   HENT: Negative for congestion, dental problem, drooling, ear discharge, ear pain, facial swelling, hearing loss, mouth sores, nosebleeds, postnasal drip, rhinorrhea, sinus pressure, sneezing, sore throat, tinnitus, trouble swallowing and voice change.    Eyes: Negative for photophobia, pain, discharge, redness, itching and visual disturbance.   Respiratory: Negative for apnea, cough, choking, chest tightness, shortness of breath, wheezing and stridor.    Cardiovascular: Negative for chest pain, palpitations and leg swelling.   Gastrointestinal: Negative for abdominal distention, abdominal pain, anal bleeding, blood in stool, constipation, diarrhea, nausea, rectal pain and vomiting.   Endocrine: Negative for cold intolerance, heat intolerance, polydipsia, polyphagia and polyuria.   Genitourinary: Negative for decreased urine volume, difficulty urinating, dysuria, enuresis, flank pain, frequency, genital sores, hematuria and urgency.   Musculoskeletal: Positive for arthralgias. Negative for back pain, gait problem, joint swelling, myalgias, neck pain and neck stiffness.   Skin: Negative for color change, pallor, rash and wound.   Allergic/Immunologic: Negative for environmental allergies, food allergies and immunocompromised state.   Neurological: Negative for dizziness, tremors, seizures,  "syncope, facial asymmetry, speech difficulty, weakness, light-headedness, numbness and headaches.   Hematological: Negative for adenopathy. Does not bruise/bleed easily.   Psychiatric/Behavioral: Negative for agitation, behavioral problems, confusion, decreased concentration, dysphoric mood, hallucinations, self-injury, sleep disturbance and suicidal ideas. The patient is not nervous/anxious and is not hyperactive.         Objective      Physical Exam  /82   Ht 165.1 cm (65\")   Wt 85.9 kg (189 lb 6.4 oz)   BMI 31.52 kg/m²     Body mass index is 31.52 kg/m².    General:   Mental Status:  Alert   Appearance: Cooperative, in no acute distress   Build and Nutrition: Well-nourished well-developed female   Orientation: Alert and oriented to person, place and time   Posture: Normal    Integument:              Right shoulder: No skin lesions, no rash, no ecchymosis     Upper Extremities:              Right Shoulder:                          Tenderness:    None                          Swelling:          None                          Range of motion:        External rotation:         70°                                                              Forward flexion:          170°                                                              Abduction:                   170°  Deformities:     None  Functional testing: Negative drop arm, negative lift-off,  negative impingement    Imaging/Studies  Imaging Results (Last 24 Hours)     ** No results found for the last 24 hours. **        No new imaging today.    Assessment and Plan     Diagnoses and all orders for this visit:    1. Impingement syndrome of right shoulder (Primary)        1. Impingement syndrome of right shoulder        I reviewed my findings with the patient.  Her shoulder is improved, and I will see her back if she has a flareup in the future.  Repeat injection could be considered.    She was briefly mentioning to me her right hip, and she will schedule " another appointment to have that evaluated.    Return if symptoms worsen or fail to improve.      Medardo Cosme MD  08/17/22  10:10 EDT

## 2022-09-21 ENCOUNTER — OFFICE VISIT (OUTPATIENT)
Dept: ORTHOPEDIC SURGERY | Facility: CLINIC | Age: 67
End: 2022-09-21

## 2022-09-21 VITALS
DIASTOLIC BLOOD PRESSURE: 70 MMHG | WEIGHT: 188.6 LBS | HEIGHT: 65 IN | SYSTOLIC BLOOD PRESSURE: 110 MMHG | BODY MASS INDEX: 31.42 KG/M2

## 2022-09-21 DIAGNOSIS — M25.551 RIGHT HIP PAIN: Primary | ICD-10-CM

## 2022-09-21 PROCEDURE — 99214 OFFICE O/P EST MOD 30 MIN: CPT | Performed by: ORTHOPAEDIC SURGERY

## 2022-09-21 NOTE — PROGRESS NOTES
[facility] Note    Subjective     Chief Complaint   Patient presents with   • Right Hip - Pain        HPI    Germaine Jett is a 67 y.o. female who presents with new problem of: right hip pain.  Onset: atraumatic and gradual in nature. The issue has been ongoing for 8 month(s). Pain is a 7/10 on the pain scale. Pain is described as dull, aching and throbbing. Associated symptoms include pain. The pain is worse with sitting and driving; resting, pain medication and/or NSAID and elevating the extremity improve the pain. Previous treatments have included: NSAIDS.  Pain is on the posterior aspect of the hip and radiates distally.  No groin pain.    I have reviewed the following portions of the patient's history and agree with: History of Present Illness and Review of Systems    Patient Active Problem List   Diagnosis   • Palpitations   • Hypertension   • Hyperlipidemia   • Obesity (BMI 30.0-34.9)   • Hypercholesterolemia   • Depression   • Knee osteoarthritis   • Hypothyroid   • Insomnia   • Primary osteoarthritis of right knee   • Status post total right knee replacement   • Leukocytosis, mild, likely reactive   • Acute blood loss anemia, mild, asymptomatic   • Acute postoperative pain     Past Medical History:   Diagnosis Date   • Acute pain of left knee    • Depression     Hx of   • Dyspnea    • Edema    • Hypercholesterolemia    • Hyperlipidemia    • Hypertension    • Hypothyroid      10/14 TSH 5.320;  04/15 TSH 4.580   • Insomnia    • Knee osteoarthritis     RT. meniscal scope, Dr. Alvino Cifuentes, 01/15. removal of ganglion cyst 01/15   • Obesity (BMI 30.0-34.9)     BMI 33.66   • Palpitations    • Periarthritis of shoulder 2020   • Wears glasses     READING      Past Surgical History:   Procedure Laterality Date   • APPENDECTOMY     • BLADDER SURGERY     • BREAST BIOPSY Left    • COLONOSCOPY  2017   • GANGLION CYST EXCISION  01/2015    R KNEE   • HYSTERECTOMY     • INCISIONAL BREAST BIOPSY      • JOINT REPLACEMENT     • KNEE ARTHROSCOPY Right    • OOPHORECTOMY     • TONSILLECTOMY AND ADENOIDECTOMY     • TOTAL ABDOMINAL HYSTERECTOMY WITH SALPINGO OOPHORECTOMY     • TOTAL KNEE ARTHROPLASTY Right 2019    Procedure: TOTAL KNEE ARTHROPLASTY RIGHT;  Surgeon: Medardo Cosme MD;  Location: [facility];  Service: Orthopedics      Family History   Problem Relation Age of Onset   • Heart attack Mother         Acute MI   • Hypertension Mother    • Stroke Mother    • Depression Mother    • Heart disease Mother    • Osteoarthritis Mother    • Osteoporosis Mother    • Heart attack Father 63           • Heart disease Father    • Hypertension Father    • Depression Other    • Heart disease Other    • Hypertension Other    • Anxiety disorder Daughter    • Depression Daughter      Social History     Socioeconomic History   • Marital status:    • Number of children: 1   • Highest education level: Master's degree (e.g., MA, MS, Misty, MEd, MSW, SIMON)   Tobacco Use   • Smoking status: Never Smoker   • Smokeless tobacco: Never Used   Vaping Use   • Vaping Use: Never used   Substance and Sexual Activity   • Alcohol use: No   • Drug use: No   • Sexual activity: Yes     Partners: Male     Birth control/protection: Surgical, Post-menopausal      Current Outpatient Medications on File Prior to Visit   Medication Sig Dispense Refill   • aspirin 81 MG EC tablet Take 1 tablet by mouth Daily. Resume in 1 month     • buPROPion XL (WELLBUTRIN XL) 300 MG 24 hr tablet Take 1 tablet by mouth Daily. 90 tablet 0   • diclofenac (VOLTAREN) 75 MG EC tablet TAKE 1 TABLET BY MOUTH TWICE DAILY FOR PAIN AND INFLAMMATION     • estradiol (ESTRACE) 1 MG tablet Take 1 tablet by mouth Daily. 30 tablet 12   • FLUoxetine (PROzac) 40 MG capsule TAKE 1 CAPSULE BY MOUTH DAILY 90 capsule 0   • hydroCHLOROthiazide (HYDRODIURIL) 12.5 MG tablet      • losartan (COZAAR) 50 MG tablet      • losartan-hydrochlorothiazide (HYZAAR) 50-12.5 MG per tablet  Take 1 tablet by mouth Daily.     • rosuvastatin (CRESTOR) 10 MG tablet Take 10 mg by mouth Every Night.  0   • vitamin D (ERGOCALCIFEROL) 1.25 MG (17505 UT) capsule capsule TK 1 C PO 1 TIME A WK UTD       No current facility-administered medications on file prior to visit.      Allergies   Allergen Reactions   • Benadryl [Diphenhydramine] Other (See Comments)     SLEEPY   • Adhesive Tape Rash   • Percocet [Oxycodone-Acetaminophen] Other (See Comments)     SEVERE ITCHING IN HEAD        Review of Systems   Constitutional: Negative for activity change, appetite change, chills, diaphoresis, fatigue, fever and unexpected weight change.   HENT: Negative for congestion, dental problem, drooling, ear discharge, ear pain, facial swelling, hearing loss, mouth sores, nosebleeds, postnasal drip, rhinorrhea, sinus pressure, sneezing, sore throat, tinnitus, trouble swallowing and voice change.    Eyes: Negative for photophobia, pain, discharge, redness, itching and visual disturbance.   Respiratory: Negative for apnea, cough, choking, chest tightness, shortness of breath, wheezing and stridor.    Cardiovascular: Negative for chest pain, palpitations and leg swelling.   Gastrointestinal: Negative for abdominal distention, abdominal pain, anal bleeding, blood in stool, constipation, diarrhea, nausea, rectal pain and vomiting.   Endocrine: Negative for cold intolerance, heat intolerance, polydipsia, polyphagia and polyuria.   Genitourinary: Negative for decreased urine volume, difficulty urinating, dysuria, enuresis, flank pain, frequency, genital sores, hematuria and urgency.   Musculoskeletal: Positive for arthralgias. Negative for back pain, gait problem, joint swelling, myalgias, neck pain and neck stiffness.   Skin: Negative for color change, pallor, rash and wound.   Allergic/Immunologic: Negative for environmental allergies, food allergies and immunocompromised state.   Neurological: Negative for dizziness, tremors,  "seizures, syncope, facial asymmetry, speech difficulty, weakness, light-headedness, numbness and headaches.   Hematological: Negative for adenopathy. Does not bruise/bleed easily.   Psychiatric/Behavioral: Negative for agitation, behavioral problems, confusion, decreased concentration, dysphoric mood, hallucinations, self-injury, sleep disturbance and suicidal ideas. The patient is not nervous/anxious and is not hyperactive.         Objective      Physical Exam  /70   Ht 165.1 cm (65\")   Wt 85.5 kg (188 lb 9.6 oz)   BMI 31.38 kg/m²     Body mass index is 31.38 kg/m².    General:   Mental Status:  Alert   Appearance: Cooperative, in no acute distress   Build and Nutrition: Well-nourished well-developed female   Orientation: Alert and oriented to person, place and time   Posture: Normal   Gait: Nonantalgic    Integument:   Right hip: No skin lesions, no rash, no ecchymosis    Neurologic:   Motor:  Right lower extremity: 5/5 quadriceps, hamstrings, ankle dorsiflexors, and ankle plantar flexors    Lower Extremities:   Right Hip:    Tenderness:  None    Swelling: None    Crepitus:  None    Atrophy:  None    Range of motion:  External Rotation: 40°       Internal Rotation: 40°       Flexion:  100°       Extension:  0°   Instability:  None  Deformities:  None  Functional testing: Negative Stinchfield    No leg length discrepancy      Imaging/Studies      Imaging Results (Last 24 Hours)     Procedure Component Value Units Date/Time    XR Hip With or Without Pelvis 2 - 3 View Right [[id number]] Resulted: 09/21/22 0944     Updated: 09/21/22 0945    Narrative:      Right Hip Radiographs  Indication: right hip pain  Views: low AP pelvis and lateral of the right hip    Comparison: no prior studies available for review    Findings:   Irregular appearing greater trochanter, with calcification anteriorly, no   acute bony abnormalities.  Mild degenerative changes in the hip joint.          Assessment and Plan     Diagnoses " and all orders for this visit:    1. Right hip pain (Primary)  -     XR Hip With or Without Pelvis 2 - 3 View Right  -     Ambulatory Referral to Orthopedic Surgery        1. Right hip pain        I reviewed my findings with the patient.  She is having right posterior hip pain that radiates distally, and I am suspicious of sciatica.  Hip joint itself does not appear to be the culprit nor bursitis.  I have recommended referral to a spine specialist, and she has seen Dr. Hu previously.    Return for Referral to Dr. Hu.      Medardo Cosme MD  09/21/22  09:52 EDT

## 2022-10-26 ENCOUNTER — TELEMEDICINE (OUTPATIENT)
Dept: PSYCHIATRY | Facility: CLINIC | Age: 67
End: 2022-10-26

## 2022-10-26 DIAGNOSIS — F41.1 GENERALIZED ANXIETY DISORDER: ICD-10-CM

## 2022-10-26 DIAGNOSIS — E66.09 CLASS 1 OBESITY DUE TO EXCESS CALORIES WITHOUT SERIOUS COMORBIDITY WITH BODY MASS INDEX (BMI) OF 32.0 TO 32.9 IN ADULT: ICD-10-CM

## 2022-10-26 DIAGNOSIS — F33.0 MILD EPISODE OF RECURRENT MAJOR DEPRESSIVE DISORDER: ICD-10-CM

## 2022-10-26 PROCEDURE — 99214 OFFICE O/P EST MOD 30 MIN: CPT | Performed by: PSYCHIATRY & NEUROLOGY

## 2022-10-26 RX ORDER — BUPROPION HYDROCHLORIDE 300 MG/1
300 TABLET ORAL DAILY
Qty: 90 TABLET | Refills: 0 | Status: SHIPPED | OUTPATIENT
Start: 2022-10-26 | End: 2023-01-17 | Stop reason: SDUPTHER

## 2022-10-26 RX ORDER — FLUOXETINE HYDROCHLORIDE 40 MG/1
40 CAPSULE ORAL DAILY
Qty: 90 CAPSULE | Refills: 0 | Status: SHIPPED | OUTPATIENT
Start: 2022-10-26 | End: 2023-01-17 | Stop reason: SDUPTHER

## 2022-10-26 NOTE — PROGRESS NOTES
"Subjective   Germaine Jett is a 67 y.o. female who presents today for follow up    Chief Complaint:  Depression and Anxiety    This provider is located at [facility], [address] [place] [address]. The Patient is seen remotely at home, using Epic Mychart. Patient is being seen via telehealth and stated they are in a secure environment for this session. The patient’s condition being diagnosed/treated is appropriate for telemedicine. The provider identified himself as well as his credentials.   The patient gave consent to be seen remotely, and when consent is given they understand that the consent allows for patient identifiable information to be sent to a third party as needed.   They may refuse to be seen remotely at any time. The electronic data is encrypted and password protected, and the patient has been advised of the potential risks to privacy not withstanding such measures        History of Present Illness: Patient presented today for follow-up.  Since last visit, she reports that she got busy with life stressors.  She is having her daughter remodel their home and was busy with other personal stressors which led to her missing her medication and then stopping the medication completely for at least 4 to 6 weeks, but perhaps longer.  She reports that she has been more irritable, gets frustrated more easily, feels more emotional and reactive, and has high anxiety.  She was not having medication side effects prior to stopping the medications.  She does report other family members have noticed that she is more irritable and, \"uptight.\"  She denies SI/HI/AVH.  Sleep is somewhat more poor and appetite is stable.    The following portions of the patient's history were reviewed and updated as appropriate: allergies, current medications, past family history, past medical history, past social history, past surgical history and problem list.      Past Medical History:  Past Medical History:   Diagnosis Date   • " Acute pain of left knee    • Depression     Hx of   • Dyspnea    • Edema    • Hypercholesterolemia    • Hyperlipidemia    • Hypertension    • Hypothyroid      10/14 TSH 5.320;  04/15 TSH 4.580   • Insomnia    • Knee osteoarthritis     RT. meniscal scope, Dr. Alvino Cifuentes, 01/15. removal of ganglion cyst 01/15   • Obesity (BMI 30.0-34.9)     BMI 33.66   • Palpitations    • Periarthritis of shoulder 2020   • Wears glasses     READING       Social History:  Social History     Socioeconomic History   • Marital status:    • Number of children: 1   • Highest education level: Master's degree (e.g., MA, MS, Misty, MEd, MSW, SIMON)   Tobacco Use   • Smoking status: Never   • Smokeless tobacco: Never   Vaping Use   • Vaping Use: Never used   Substance and Sexual Activity   • Alcohol use: No   • Drug use: No   • Sexual activity: Yes     Partners: Male     Birth control/protection: Surgical, Post-menopausal       Family History:  Family History   Problem Relation Age of Onset   • Heart attack Mother         Acute MI   • Hypertension Mother    • Stroke Mother    • Depression Mother    • Heart disease Mother    • Osteoarthritis Mother    • Osteoporosis Mother    • Heart attack Father 63           • Heart disease Father    • Hypertension Father    • Depression Other    • Heart disease Other    • Hypertension Other    • Anxiety disorder Daughter    • Depression Daughter        Past Surgical History:  Past Surgical History:   Procedure Laterality Date   • APPENDECTOMY     • BLADDER SURGERY     • BREAST BIOPSY Left    • COLONOSCOPY  2017   • GANGLION CYST EXCISION  2015    R KNEE   • HYSTERECTOMY     • INCISIONAL BREAST BIOPSY     • JOINT REPLACEMENT     • KNEE ARTHROSCOPY Right    • OOPHORECTOMY     • TONSILLECTOMY AND ADENOIDECTOMY     • TOTAL ABDOMINAL HYSTERECTOMY WITH SALPINGO OOPHORECTOMY     • TOTAL KNEE ARTHROPLASTY Right 2019    Procedure: TOTAL KNEE ARTHROPLASTY RIGHT;  Surgeon: Medardo Cosme MD;   Location: [facility];  Service: Orthopedics       Problem List:  Patient Active Problem List   Diagnosis   • Palpitations   • Hypertension   • Hyperlipidemia   • Obesity (BMI 30.0-34.9)   • Hypercholesterolemia   • Depression   • Knee osteoarthritis   • Hypothyroid   • Insomnia   • Primary osteoarthritis of right knee   • Status post total right knee replacement   • Leukocytosis, mild, likely reactive   • Acute blood loss anemia, mild, asymptomatic   • Acute postoperative pain       Allergy:   Allergies   Allergen Reactions   • Benadryl [Diphenhydramine] Other (See Comments)     SLEEPY   • Adhesive Tape Rash   • Percocet [Oxycodone-Acetaminophen] Other (See Comments)     SEVERE ITCHING IN HEAD        Current Medications:   Current Outpatient Medications   Medication Sig Dispense Refill   • aspirin 81 MG EC tablet Take 1 tablet by mouth Daily. Resume in 1 month     • buPROPion XL (WELLBUTRIN XL) 300 MG 24 hr tablet Take 1 tablet by mouth Daily. 90 tablet 0   • diclofenac (VOLTAREN) 75 MG EC tablet TAKE 1 TABLET BY MOUTH TWICE DAILY FOR PAIN AND INFLAMMATION     • estradiol (ESTRACE) 1 MG tablet Take 1 tablet by mouth Daily. 30 tablet 12   • FLUoxetine (PROzac) 40 MG capsule TAKE 1 CAPSULE BY MOUTH DAILY 90 capsule 0   • hydroCHLOROthiazide (HYDRODIURIL) 12.5 MG tablet      • losartan (COZAAR) 50 MG tablet      • losartan-hydrochlorothiazide (HYZAAR) 50-12.5 MG per tablet Take 1 tablet by mouth Daily.     • rosuvastatin (CRESTOR) 10 MG tablet Take 10 mg by mouth Every Night.  0   • vitamin D (ERGOCALCIFEROL) 1.25 MG (53433 UT) capsule capsule TK 1 C PO 1 TIME A WK UTD       No current facility-administered medications for this visit.       Review of Symptoms:    Review of Systems   Constitutional: Negative for activity change (improved) and fatigue.   HENT: Negative.    Respiratory: Negative.    Cardiovascular: Negative.    Gastrointestinal: Negative.    Neurological: Negative.    Psychiatric/Behavioral: Positive for  agitation, dysphoric mood, sleep disturbance and stress. Negative for behavioral problems. The patient is nervous/anxious.          Physical Exam:   not currently breastfeeding.  Video visit    Appearance:  female stated age in no acute distress  Gait, Station, Strength: Video visit    Mental Status Exam:     Hygiene:   good  Cooperation:  Cooperative  Eye Contact:  Good  Psychomotor Behavior:  Appropriate  Affect:  Full range  Mood: anxious, irritable and dysphoric, stable   Hopelessness: Denies  Speech:  Normal  Thought Process:  Goal directed and Linear  Thought Content:  Normal and Mood congruent  Suicidal:  None  Homicidal:  None  Hallucinations:  None  Delusion:  None  Memory:  Intact  Orientation:  Person, Place, Time and Situation  Reliability:  good  Insight:  Good  Judgement:  Good  Impulse Control:  Good  Physical/Medical Issues:  No        Lab Results:   No visits with results within 1 Month(s) from this visit.   Latest known visit with results is:   Office Visit on 12/30/2020   Component Date Value Ref Range Status   • External Amphetamine Screen Urine 12/30/2020 Negative   Final   • Amphetamine Cut-Off 12/30/2020 1000ng/ml   Final   • External Benzodiazepine Screen Uri* 12/30/2020 Negative   Final   • Benzodiazipine Cut-Off 12/30/2020 300ng/ml   Final   • External Cocaine Screen Urine 12/30/2020 Negative   Final   • Cocaine Cut-Off 12/30/2020 300ng/ml   Final   • External THC Screen Urine 12/30/2020 Negative   Final   • THC Cut-Off 12/30/2020 50ng/ml   Final   • External Methadone Screen Urine 12/30/2020 Negative   Final   • Methadone Cut-Off 12/30/2020 300ng/ml   Final   • External Methamphetamine Screen Ur* 12/30/2020 Negative   Final   • Methamphetamine Cut-Off 12/30/2020 1000ng/ml   Final   • External Oxycodone Screen Urine 12/30/2020 Negative   Final   • Oxycodone Cut-Off 12/30/2020 100ng/ml   Final   • External Buprenorphine Screen Urine 12/30/2020 Negative   Final   • Buprenorphine  Cut-Off 12/30/2020 10ng/ml   Final   • External MDMA 12/30/2020 Negative   Final   • MDMA Cut-Off 12/30/2020 500ng/ml   Final   • External Opiates Screen Urine 12/30/2020 Negative   Final   • Opiates Cut-Off 12/30/2020 300ng/ml   Final       Assessment & Plan   Diagnoses and all orders for this visit:    1. Recurrent major depressive disorder, in partial remission (HCC)  -     FLUoxetine (PROzac) 40 MG capsule; Take 1 capsule by mouth Daily.  Dispense: 90 capsule; Refill: 0  -     buPROPion XL (WELLBUTRIN XL) 300 MG 24 hr tablet; Take 1 tablet by mouth Daily.  Dispense: 90 tablet; Refill: 0    2. Generalized anxiety disorder  -     FLUoxetine (PROzac) 40 MG capsule; Take 1 capsule by mouth Daily.  Dispense: 90 capsule; Refill: 0  -     buPROPion XL (WELLBUTRIN XL) 300 MG 24 hr tablet; Take 1 tablet by mouth Daily.  Dispense: 90 tablet; Refill: 0    3. Class 1 obesity due to excess calories without serious comorbidity with body mass index (BMI) of 32.0 to 32.9 in adult  -     buPROPion XL (WELLBUTRIN XL) 300 MG 24 hr tablet; Take 1 tablet by mouth Daily.  Dispense: 90 tablet; Refill: 0    -Patient having some worsening mood and anxiety symptoms with agitation and irritability and low frustration tolerance and has not been taking her medications reliably.  Advised to restart medications that were effective before.  Told patient to start Prozac for 1 week prior to adding Wellbutrin as Wellbutrin by itself can cause an increase in energy which can go to an irritable or anxious place if Prozac is not already on board.  -Reviewed previous available documentation  -Reviewed most recent available labs   -Continue Prozac 40 mg p.o. daily for mood and anxiety  -Continue Wellbutrin  mg p.o. daily for mood and anxiety as well as weight  -Obesity complicates all aspects of care  -Encouraged her continued healthy eating practices and physical activity as she has had some successful weight loss  -Encouraged to consider  therapy.  -Approximate appointment time 9:05 AM to 9:20 AM via video visit      Visit Diagnoses:    ICD-10-CM ICD-9-CM   1. Recurrent major depressive disorder, in partial remission (HCC)  F33.41 296.35   2. Generalized anxiety disorder  F41.1 300.02   3. Class 1 obesity due to excess calories without serious comorbidity with body mass index (BMI) of 32.0 to 32.9 in adult  E66.09 278.00    Z68.32 V85.32       TREATMENT PLAN/GOALS: Continue supportive psychotherapy efforts and medications as indicated. Treatment and medication options discussed during today's visit. Patient acknowledged and verbally consented to continue with current treatment plan and was educated on the importance of compliance with treatment and follow-up appointments.    MEDICATION ISSUES:    Discussed medication options and treatment plan of prescribed medication as well as the risks, benefits, and side effects including potential falls, possible impaired driving and metabolic adversities among others. Patient is agreeable to call the office with any worsening of symptoms or onset of side effects. Patient is agreeable to call 911 or go to the nearest ER should he/she begin having SI/HI.     MEDS ORDERED DURING VISIT:  New Medications Ordered This Visit   Medications   • FLUoxetine (PROzac) 40 MG capsule     Sig: Take 1 capsule by mouth Daily.     Dispense:  90 capsule     Refill:  0   • buPROPion XL (WELLBUTRIN XL) 300 MG 24 hr tablet     Sig: Take 1 tablet by mouth Daily.     Dispense:  90 tablet     Refill:  0       Return in about 4 weeks (around 11/23/2022).             This document has been electronically signed by Sim Knight MD  October 26, 2022 09:06 EDT

## 2022-11-02 ENCOUNTER — HOSPITAL ENCOUNTER (OUTPATIENT)
Dept: MAMMOGRAPHY | Facility: HOSPITAL | Age: 67
Discharge: HOME | End: 2022-11-02

## 2022-11-02 ENCOUNTER — OFFICE VISIT (OUTPATIENT)
Dept: OBSTETRICS AND GYNECOLOGY | Facility: CLINIC | Age: 67
End: 2022-11-02

## 2022-11-02 VITALS
SYSTOLIC BLOOD PRESSURE: 114 MMHG | DIASTOLIC BLOOD PRESSURE: 70 MMHG | BODY MASS INDEX: 31.99 KG/M2 | HEIGHT: 65 IN | WEIGHT: 192 LBS

## 2022-11-02 DIAGNOSIS — Z01.419 WELL WOMAN EXAM WITH ROUTINE GYNECOLOGICAL EXAM: Primary | ICD-10-CM

## 2022-11-02 DIAGNOSIS — Z12.72 SMEAR, VAGINAL, AS PART OF ROUTINE GYNECOLOGICAL EXAMINATION: ICD-10-CM

## 2022-11-02 DIAGNOSIS — N39.46 MIXED INCONTINENCE: ICD-10-CM

## 2022-11-02 DIAGNOSIS — Z12.31 VISIT FOR SCREENING MAMMOGRAM: ICD-10-CM

## 2022-11-02 DIAGNOSIS — N95.2 VAGINAL ATROPHY: ICD-10-CM

## 2022-11-02 DIAGNOSIS — Z79.890 HORMONE REPLACEMENT THERAPY (POSTMENOPAUSAL): ICD-10-CM

## 2022-11-02 DIAGNOSIS — Z01.419 SMEAR, VAGINAL, AS PART OF ROUTINE GYNECOLOGICAL EXAMINATION: ICD-10-CM

## 2022-11-02 PROCEDURE — 99397 PER PM REEVAL EST PAT 65+ YR: CPT | Performed by: PHYSICIAN ASSISTANT

## 2022-11-02 PROCEDURE — 3015F CERV CANCER SCREEN DOCD: CPT | Performed by: PHYSICIAN ASSISTANT

## 2022-11-02 RX ORDER — ESTRADIOL 1 MG/1
1 TABLET ORAL DAILY
Qty: 90 TABLET | Refills: 3 | Status: SHIPPED | OUTPATIENT
Start: 2022-11-02

## 2022-11-02 NOTE — PATIENT INSTRUCTIONS
Self breast exam monthly  Annual mammogram  Calcium 1200 mg daily and vit D 2000 mg daily  Regular excercise

## 2022-11-02 NOTE — PROGRESS NOTES
Subjective   Chief Complaint   Patient presents with   • Gynecologic Exam     Last pap done 3+ years ago, MMG is scheduled, C/O incontinence       Germaine Jett is a 67 y.o. year old  presenting to be seen for her annual gynecological exam.   She reports no complaints except some urinary dribbling and mild incontinence.  This has been off and on for 3 years.  Reports that when she takes her estradiol her symptoms resolve however she did not take her estradiol from May until October. She started taking the estradiol again a few weeks ago and symptoms have greatly improved.  Reports she just forgets to take her hormone consistently. She does desire refills of estradiol as she feels much better taking it.  Previous hysterectomy and bilateral salpingo-oophorectomy  She has a screening mammogram scheduled for December.  She had screening mammogram scheduled for today but had to reschedule because of recent COVID booster    Past Medical History:   Diagnosis Date   • Acute pain of left knee    • Depression     Hx of   • Dyspnea    • Edema    • Hypercholesterolemia    • Hyperlipidemia    • Hypertension    • Hypothyroid      10/14 TSH 5.320;  04/15 TSH 4.580   • Insomnia    • Knee osteoarthritis     RT. meniscal scope, Dr. Alvino Cifuentes, 01/15. removal of ganglion cyst 01/15   • Obesity (BMI 30.0-34.9)     BMI 33.66   • Palpitations    • Periarthritis of shoulder 2020   • Wears glasses     READING        Current Outpatient Medications:   •  aspirin 81 MG EC tablet, Take 1 tablet by mouth Daily. Resume in 1 month, Disp: , Rfl:   •  buPROPion XL (WELLBUTRIN XL) 300 MG 24 hr tablet, Take 1 tablet by mouth Daily., Disp: 90 tablet, Rfl: 0  •  estradiol (ESTRACE) 1 MG tablet, Take 1 tablet by mouth Daily., Disp: 90 tablet, Rfl: 3  •  FLUoxetine (PROzac) 40 MG capsule, Take 1 capsule by mouth Daily., Disp: 90 capsule, Rfl: 0  •  hydroCHLOROthiazide (HYDRODIURIL) 12.5 MG tablet, , Disp: , Rfl:   •  losartan (COZAAR) 50 MG  tablet, , Disp: , Rfl:   •  losartan-hydrochlorothiazide (HYZAAR) 50-12.5 MG per tablet, Take 1 tablet by mouth Daily., Disp: , Rfl:   •  rosuvastatin (CRESTOR) 10 MG tablet, Take 10 mg by mouth Every Night., Disp: , Rfl: 0   Allergies   Allergen Reactions   • Benadryl [Diphenhydramine] Other (See Comments)     SLEEPY   • Adhesive Tape Rash   • Percocet [Oxycodone-Acetaminophen] Other (See Comments)     SEVERE ITCHING IN HEAD      Past Surgical History:   Procedure Laterality Date   • APPENDECTOMY     • BLADDER SURGERY     • BREAST BIOPSY Left    • COLONOSCOPY  2017   • GANGLION CYST EXCISION  2015    R KNEE   • HYSTERECTOMY     • INCISIONAL BREAST BIOPSY     • JOINT REPLACEMENT     • KNEE ARTHROSCOPY Right    • OOPHORECTOMY     • TONSILLECTOMY AND ADENOIDECTOMY     • TOTAL ABDOMINAL HYSTERECTOMY WITH SALPINGO OOPHORECTOMY     • TOTAL KNEE ARTHROPLASTY Right 2019    Procedure: TOTAL KNEE ARTHROPLASTY RIGHT;  Surgeon: Medardo Cosme MD;  Location: [facility] [place];  Service: Orthopedics      Social History     Socioeconomic History   • Marital status:    • Number of children: 1   • Highest education level: Master's degree (e.g., MA, MS, Misty, MEd, MSW, SIMON)   Tobacco Use   • Smoking status: Never   • Smokeless tobacco: Never   Vaping Use   • Vaping Use: Never used   Substance and Sexual Activity   • Alcohol use: No   • Drug use: No   • Sexual activity: Yes     Partners: Male     Birth control/protection: Surgical, Post-menopausal      Family History   Problem Relation Age of Onset   • Heart attack Mother         Acute MI   • Hypertension Mother    • Stroke Mother    • Depression Mother    • Heart disease Mother    • Osteoarthritis Mother    • Osteoporosis Mother    • Heart attack Father 63           • Heart disease Father    • Hypertension Father    • Depression Other    • Heart disease Other    • Hypertension Other    • Anxiety disorder Daughter    • Depression Daughter        Review of Systems  "  Constitutional: Negative for chills, diaphoresis and fever.   Gastrointestinal: Negative for constipation, diarrhea, nausea and vomiting.   Genitourinary: Positive for enuresis. Negative for difficulty urinating, dysuria, pelvic pain, vaginal bleeding and vaginal discharge.           Objective   /70   Ht 165.1 cm (65\")   Wt 87.1 kg (192 lb)   BMI 31.95 kg/m²     Physical Exam  Chaperone present: Марина Khoury MA.   Constitutional:       Appearance: Normal appearance. She is well-developed and well-groomed.   Eyes:      General: Lids are normal.      Extraocular Movements: Extraocular movements intact.      Conjunctiva/sclera: Conjunctivae normal.   Chest:   Breasts:     Breasts are symmetrical.      Right: No inverted nipple, mass, nipple discharge, skin change or tenderness.      Left: No inverted nipple, mass, nipple discharge, skin change or tenderness.   Abdominal:      General: There is no distension.      Palpations: Abdomen is soft.      Tenderness: There is no abdominal tenderness.   Genitourinary:     Labia:         Right: No rash, tenderness or lesion.         Left: No rash, tenderness or lesion.       Urethra: No prolapse, urethral pain, urethral swelling or urethral lesion.      Vagina: No vaginal discharge, tenderness, bleeding or lesions.      Uterus: Absent.       Adnexa:         Right: No mass or tenderness.          Left: No mass or tenderness.        Comments: Urethral meatus without lesions or discharge  Lymphadenopathy:      Upper Body:      Right upper body: No axillary adenopathy.      Left upper body: No axillary adenopathy.   Skin:     General: Skin is warm and dry.      Findings: No bruising or lesion.   Neurological:      General: No focal deficit present.      Mental Status: She is alert and oriented to person, place, and time.   Psychiatric:         Attention and Perception: Attention normal.         Mood and Affect: Mood normal.         Speech: Speech normal.         " Behavior: Behavior is cooperative.            Result Review :                   Assessment and Plan  Diagnoses and all orders for this visit:    1. Well woman exam with routine gynecological exam (Primary)    2. Smear, vaginal, as part of routine gynecological examination  -     LIQUID-BASED PAP SMEAR, [facility] ([facility])    3. Vaginal atrophy    4. Mixed incontinence    5. Hormone replacement therapy (postmenopausal)    Other orders  -     estradiol (ESTRACE) 1 MG tablet; Take 1 tablet by mouth Daily.  Dispense: 90 tablet; Refill: 3      Patient Instructions   Self breast exam monthly  Annual mammogram  Calcium 1200 mg daily and vit D 2000 mg daily  Regular excercise              This note was electronically signed.    Edwige Frausto PA-C   November 2, 2022

## 2022-11-04 LAB — REF LAB TEST METHOD: NORMAL

## 2022-12-16 ENCOUNTER — APPOINTMENT (OUTPATIENT)
Dept: MAMMOGRAPHY | Facility: HOSPITAL | Age: 67
End: 2022-12-16

## 2022-12-21 ENCOUNTER — OFFICE VISIT (OUTPATIENT)
Dept: ORTHOPEDIC SURGERY | Facility: CLINIC | Age: 67
End: 2022-12-21

## 2022-12-21 VITALS
BODY MASS INDEX: 31.32 KG/M2 | WEIGHT: 188 LBS | SYSTOLIC BLOOD PRESSURE: 124 MMHG | DIASTOLIC BLOOD PRESSURE: 84 MMHG | HEIGHT: 65 IN

## 2022-12-21 DIAGNOSIS — M75.41 IMPINGEMENT SYNDROME OF RIGHT SHOULDER: Primary | ICD-10-CM

## 2022-12-21 PROCEDURE — 20610 DRAIN/INJ JOINT/BURSA W/O US: CPT | Performed by: ORTHOPAEDIC SURGERY

## 2022-12-21 RX ORDER — TRIAMCINOLONE ACETONIDE 40 MG/ML
40 INJECTION, SUSPENSION INTRA-ARTICULAR; INTRAMUSCULAR
Status: COMPLETED | OUTPATIENT
Start: 2022-12-21 | End: 2022-12-21

## 2022-12-21 RX ORDER — ROPIVACAINE HYDROCHLORIDE 5 MG/ML
4 INJECTION, SOLUTION EPIDURAL; INFILTRATION; PERINEURAL
Status: COMPLETED | OUTPATIENT
Start: 2022-12-21 | End: 2022-12-21

## 2022-12-21 RX ADMIN — TRIAMCINOLONE ACETONIDE 40 MG: 40 INJECTION, SUSPENSION INTRA-ARTICULAR; INTRAMUSCULAR at 10:34

## 2022-12-21 RX ADMIN — ROPIVACAINE HYDROCHLORIDE 4 ML: 5 INJECTION, SOLUTION EPIDURAL; INFILTRATION; PERINEURAL at 10:34

## 2022-12-21 NOTE — PROGRESS NOTES
Procedure   Large Joint Arthrocentesis: R subacromial bursa  Date/Time: 12/21/2022 10:34 AM  Consent given by: patient  Site marked: site marked  Timeout: Immediately prior to procedure a time out was called to verify the correct patient, procedure, equipment, support staff and site/side marked as required   Supporting Documentation  Indications: pain   Procedure Details  Location: shoulder - R subacromial bursa  Preparation: Patient was prepped and draped in the usual sterile fashion  Needle size: 23 G  Approach: posterior  Medications administered: 4 mL ropivacaine 0.5 %; 40 mg triamcinolone acetonide 40 MG/ML  Patient tolerance: patient tolerated the procedure well with no immediate complications

## 2022-12-21 NOTE — PROGRESS NOTES
[facility] Note    Subjective     Chief Complaint   Patient presents with   • Follow-up     4 months- Impingement syndrome of right shoulder         HPI    It has been 4  month(s) since Ms. Jett's last visit. She returns to clinic today for follow-up of right shoulder pain. The issue has been ongoing for 2 year(s). She rates her pain a 7/10 on the pain scale. Previous/current treatments: NSAIDS. Current symptoms: pain. The pain is worse with lying on affected side; resting and pain medication and/or NSAID improve the pain. Overall, she is doing better.  She has reached a point where she would like to have a repeat subacromial injection, last one was 4/11/2022.    I have reviewed the following portions of the patient's history and agree with: History of Present Illness and Review of Systems    Patient Active Problem List   Diagnosis   • Palpitations   • Hypertension   • Hyperlipidemia   • Obesity (BMI 30.0-34.9)   • Hypercholesterolemia   • Depression   • Knee osteoarthritis   • Hypothyroid   • Insomnia   • Primary osteoarthritis of right knee   • Status post total right knee replacement   • Leukocytosis, mild, likely reactive   • Acute blood loss anemia, mild, asymptomatic   • Acute postoperative pain     Past Medical History:   Diagnosis Date   • Acute pain of left knee    • Depression     Hx of   • Dyspnea    • Edema    • Hypercholesterolemia    • Hyperlipidemia    • Hypertension    • Hypothyroid      10/14 TSH 5.320;  04/15 TSH 4.580   • Insomnia    • Knee osteoarthritis     RT. meniscal scope, Dr. Alvino Cifuentes, 01/15. removal of ganglion cyst 01/15   • Obesity (BMI 30.0-34.9)     BMI 33.66   • Palpitations    • Periarthritis of shoulder 2020   • Wears glasses     READING      Past Surgical History:   Procedure Laterality Date   • APPENDECTOMY     • BLADDER SURGERY     • BREAST BIOPSY Left    • COLONOSCOPY  2017   • GANGLION CYST EXCISION  01/2015    R KNEE   • HYSTERECTOMY     •  INCISIONAL BREAST BIOPSY     • JOINT REPLACEMENT     • KNEE ARTHROSCOPY Right    • OOPHORECTOMY     • TONSILLECTOMY AND ADENOIDECTOMY     • TOTAL ABDOMINAL HYSTERECTOMY WITH SALPINGO OOPHORECTOMY     • TOTAL KNEE ARTHROPLASTY Right 2019    Procedure: TOTAL KNEE ARTHROPLASTY RIGHT;  Surgeon: Medardo Cosme MD;  Location: [facility];  Service: Orthopedics      Family History   Problem Relation Age of Onset   • Heart attack Mother         Acute MI   • Hypertension Mother    • Stroke Mother    • Depression Mother    • Heart disease Mother    • Osteoarthritis Mother    • Osteoporosis Mother    • Heart attack Father 63           • Heart disease Father    • Hypertension Father    • Depression Other    • Heart disease Other    • Hypertension Other    • Anxiety disorder Daughter    • Depression Daughter      Social History     Socioeconomic History   • Marital status:    • Number of children: 1   • Highest education level: Master's degree (e.g., MA, MS, Misty, MEd, MSW, SIMON)   Tobacco Use   • Smoking status: Never   • Smokeless tobacco: Never   Vaping Use   • Vaping Use: Never used   Substance and Sexual Activity   • Alcohol use: No   • Drug use: No   • Sexual activity: Yes     Partners: Male     Birth control/protection: Surgical, Post-menopausal      Current Outpatient Medications on File Prior to Visit   Medication Sig Dispense Refill   • aspirin 81 MG EC tablet Take 1 tablet by mouth Daily. Resume in 1 month     • buPROPion XL (WELLBUTRIN XL) 300 MG 24 hr tablet Take 1 tablet by mouth Daily. 90 tablet 0   • estradiol (ESTRACE) 1 MG tablet Take 1 tablet by mouth Daily. 90 tablet 3   • FLUoxetine (PROzac) 40 MG capsule Take 1 capsule by mouth Daily. 90 capsule 0   • hydroCHLOROthiazide (HYDRODIURIL) 12.5 MG tablet      • losartan (COZAAR) 50 MG tablet      • losartan-hydrochlorothiazide (HYZAAR) 50-12.5 MG per tablet Take 1 tablet by mouth Daily.     • rosuvastatin (CRESTOR) 10 MG tablet Take 10 mg by  mouth Every Night.  0     No current facility-administered medications on file prior to visit.      Allergies   Allergen Reactions   • Benadryl [Diphenhydramine] Other (See Comments)     SLEEPY   • Adhesive Tape Rash   • Percocet [Oxycodone-Acetaminophen] Other (See Comments)     SEVERE ITCHING IN HEAD        Review of Systems   Constitutional: Negative for activity change, appetite change, chills, diaphoresis, fatigue, fever and unexpected weight change.   HENT: Negative for congestion, dental problem, drooling, ear discharge, ear pain, facial swelling, hearing loss, mouth sores, nosebleeds, postnasal drip, rhinorrhea, sinus pressure, sneezing, sore throat, tinnitus, trouble swallowing and voice change.    Eyes: Negative for photophobia, pain, discharge, redness, itching and visual disturbance.   Respiratory: Negative for apnea, cough, choking, chest tightness, shortness of breath, wheezing and stridor.    Cardiovascular: Negative for chest pain, palpitations and leg swelling.   Gastrointestinal: Negative for abdominal distention, abdominal pain, anal bleeding, blood in stool, constipation, diarrhea, nausea, rectal pain and vomiting.   Endocrine: Negative for cold intolerance, heat intolerance, polydipsia, polyphagia and polyuria.   Genitourinary: Negative for decreased urine volume, difficulty urinating, dysuria, enuresis, flank pain, frequency, genital sores, hematuria and urgency.   Musculoskeletal: Positive for arthralgias. Negative for back pain, gait problem, joint swelling, myalgias, neck pain and neck stiffness.   Skin: Negative for color change, pallor, rash and wound.   Allergic/Immunologic: Negative for environmental allergies, food allergies and immunocompromised state.   Neurological: Negative for dizziness, tremors, seizures, syncope, facial asymmetry, speech difficulty, weakness, light-headedness, numbness and headaches.   Hematological: Negative for adenopathy. Does not bruise/bleed easily.  "  Psychiatric/Behavioral: Negative for agitation, behavioral problems, confusion, decreased concentration, dysphoric mood, hallucinations, self-injury, sleep disturbance and suicidal ideas. The patient is not nervous/anxious and is not hyperactive.         Objective      Physical Exam  /84   Ht 165.1 cm (65\")   Wt 85.3 kg (188 lb)   BMI 31.28 kg/m²     Body mass index is 31.28 kg/m².    General:   Mental Status:  Alert   Appearance: Cooperative, in no acute distress   Build and Nutrition: Well-nourished well-developed female   Orientation: Alert and oriented to person, place and time   Posture: Normal   Gait: Normal    Integument:              Right shoulder: No skin lesions, no rash, no ecchymosis     Upper Extremities:              Right Shoulder:                          Tenderness:    None                          Swelling:          None                          Range of motion:        External rotation:         70°                                                              Forward flexion:          170°                                                              Abduction:                   170°  Deformities:     None  Functional testing: Negative drop arm, negative lift-off, mildly positive impingement    Imaging/Studies  Imaging Results (Last 24 Hours)     ** No results found for the last 24 hours. **        No new imaging today.    Assessment and Plan     Diagnoses and all orders for this visit:    1. Impingement syndrome of right shoulder (Primary)  -     Large Joint Arthrocentesis: R subacromial bursa        1. Impingement syndrome of right shoulder        I reviewed my findings with the patient.  She would like a subacromial injection today, as her shoulder pain is flared up.  Previous subacromial injection provided significant relief.  I will see her back in 3 months, but sooner for any problems.  Further imaging with MRI may be considered if she has persistent/worsening " symptoms.    Procedure Note:  The potential benefits of performing a therapeutic right shoulder glenohumeral joint injection, as well as potential risks (including, but not limited to infection, swelling, pain, bleeding, bruising, nerve/blood vessel damage, skin color changes, transient elevation in blood glucose levels, and fat atrophy) were discussed with the patient.  After informed consent, timeout procedure was performed, and the skin on the right shoulder was prepped with chlorhexidine soap and alcohol, after which ethyl chloride was applied to the skin at the injection site. Via the posterior approach, 1ml of Kenalog 40mg/ml mixed with 4ml 0.5% ropivacaine plain was injected into the shoulder joint.  The patient tolerated the procedure well, experiencing 90% improvement a few minutes following the injection. There were no complications.  Band-Aid was applied to the injection site. Post-procedural instructions were given to the patient and/or their caregiver.      Return in about 3 months (around 3/21/2023).      Medardo Cosme MD  12/21/22  11:16 EST

## 2023-01-13 ENCOUNTER — APPOINTMENT (OUTPATIENT)
Dept: MAMMOGRAPHY | Facility: HOSPITAL | Age: 68
End: 2023-01-13
Payer: MEDICARE

## 2023-01-17 ENCOUNTER — TELEMEDICINE (OUTPATIENT)
Dept: PSYCHIATRY | Facility: CLINIC | Age: 68
End: 2023-01-17
Payer: MEDICARE

## 2023-01-17 DIAGNOSIS — F33.0 MILD EPISODE OF RECURRENT MAJOR DEPRESSIVE DISORDER: ICD-10-CM

## 2023-01-17 DIAGNOSIS — F41.1 GENERALIZED ANXIETY DISORDER: ICD-10-CM

## 2023-01-17 DIAGNOSIS — E66.09 CLASS 1 OBESITY DUE TO EXCESS CALORIES WITHOUT SERIOUS COMORBIDITY WITH BODY MASS INDEX (BMI) OF 32.0 TO 32.9 IN ADULT: ICD-10-CM

## 2023-01-17 PROCEDURE — 99214 OFFICE O/P EST MOD 30 MIN: CPT | Performed by: PSYCHIATRY & NEUROLOGY

## 2023-01-17 RX ORDER — BUPROPION HYDROCHLORIDE 300 MG/1
300 TABLET ORAL DAILY
Qty: 90 TABLET | Refills: 0 | Status: SHIPPED | OUTPATIENT
Start: 2023-01-17

## 2023-01-17 RX ORDER — FLUOXETINE HYDROCHLORIDE 40 MG/1
40 CAPSULE ORAL DAILY
Qty: 90 CAPSULE | Refills: 0 | Status: SHIPPED | OUTPATIENT
Start: 2023-01-17

## 2023-01-17 NOTE — PROGRESS NOTES
Subjective   Germaine Jett is a 67 y.o. female who presents today for follow up    Chief Complaint:  Depression and Anxiety    This provider is located at [facility], [address] [place] [address]. The Patient is seen remotely at home, using Epic Mychart. Patient is being seen via telehealth and stated they are in a secure environment for this session. The patient’s condition being diagnosed/treated is appropriate for telemedicine. The provider identified himself as well as his credentials.   The patient gave consent to be seen remotely, and when consent is given they understand that the consent allows for patient identifiable information to be sent to a third party as needed.   They may refuse to be seen remotely at any time. The electronic data is encrypted and password protected, and the patient has been advised of the potential risks to privacy not withstanding such measures        History of Present Illness: Patient presented today for follow-up.  She reports that since last visit, she got back on her medications.  She had gone off of them and noticed some changes in we had discussed how to adequately start medications back.  I advised her to start Prozac first and then Wellbutrin as Wellbutrin by itself can increase anxiety.  She did this and is doing very well.  She can tell a difference and feels that mood and anxiety symptoms are well controlled and her irritability is reduced.  She is not having medication side effects.  Sleep and appetite are appropriate.  She denies SI/HI/AVH.    The following portions of the patient's history were reviewed and updated as appropriate: allergies, current medications, past family history, past medical history, past social history, past surgical history and problem list.      Past Medical History:  Past Medical History:   Diagnosis Date   • Acute pain of left knee    • Depression     Hx of   • Dyspnea    • Edema    • Hypercholesterolemia    • Hyperlipidemia    •  Hypertension    • Hypothyroid      10/14 TSH 5.320;  04/15 TSH 4.580   • Insomnia    • Knee osteoarthritis     RT. meniscal scope, Dr. Alvino Cifuentes, 01/15. removal of ganglion cyst 01/15   • Obesity (BMI 30.0-34.9)     BMI 33.66   • Palpitations    • Periarthritis of shoulder 2020   • Wears glasses     READING       Social History:  Social History     Socioeconomic History   • Marital status:    • Number of children: 1   • Highest education level: Master's degree (e.g., MA, MS, Misty, MEd, MSW, SIMON)   Tobacco Use   • Smoking status: Never   • Smokeless tobacco: Never   Vaping Use   • Vaping Use: Never used   Substance and Sexual Activity   • Alcohol use: No   • Drug use: No   • Sexual activity: Yes     Partners: Male     Birth control/protection: Surgical, Post-menopausal       Family History:  Family History   Problem Relation Age of Onset   • Heart attack Mother         Acute MI   • Hypertension Mother    • Stroke Mother    • Depression Mother    • Heart disease Mother    • Osteoarthritis Mother    • Osteoporosis Mother    • Heart attack Father 63           • Heart disease Father    • Hypertension Father    • Depression Other    • Heart disease Other    • Hypertension Other    • Anxiety disorder Daughter    • Depression Daughter        Past Surgical History:  Past Surgical History:   Procedure Laterality Date   • APPENDECTOMY     • BLADDER SURGERY     • BREAST BIOPSY Left    • COLONOSCOPY  2017   • GANGLION CYST EXCISION  2015    R KNEE   • HYSTERECTOMY     • INCISIONAL BREAST BIOPSY     • JOINT REPLACEMENT     • KNEE ARTHROSCOPY Right    • OOPHORECTOMY     • TONSILLECTOMY AND ADENOIDECTOMY     • TOTAL ABDOMINAL HYSTERECTOMY WITH SALPINGO OOPHORECTOMY     • TOTAL KNEE ARTHROPLASTY Right 2019    Procedure: TOTAL KNEE ARTHROPLASTY RIGHT;  Surgeon: Medardo Cosme MD;  Location: [facility];  Service: Orthopedics       Problem List:  Patient Active Problem List   Diagnosis   • Palpitations   •  Hypertension   • Hyperlipidemia   • Obesity (BMI 30.0-34.9)   • Hypercholesterolemia   • Depression   • Knee osteoarthritis   • Hypothyroid   • Insomnia   • Primary osteoarthritis of right knee   • Status post total right knee replacement   • Leukocytosis, mild, likely reactive   • Acute blood loss anemia, mild, asymptomatic   • Acute postoperative pain       Allergy:   Allergies   Allergen Reactions   • Benadryl [Diphenhydramine] Other (See Comments)     SLEEPY   • Adhesive Tape Rash   • Percocet [Oxycodone-Acetaminophen] Other (See Comments)     SEVERE ITCHING IN HEAD        Current Medications:   Current Outpatient Medications   Medication Sig Dispense Refill   • aspirin 81 MG EC tablet Take 1 tablet by mouth Daily. Resume in 1 month     • buPROPion XL (WELLBUTRIN XL) 300 MG 24 hr tablet Take 1 tablet by mouth Daily. 90 tablet 0   • estradiol (ESTRACE) 1 MG tablet Take 1 tablet by mouth Daily. 90 tablet 3   • FLUoxetine (PROzac) 40 MG capsule Take 1 capsule by mouth Daily. 90 capsule 0   • hydroCHLOROthiazide (HYDRODIURIL) 12.5 MG tablet      • losartan (COZAAR) 50 MG tablet      • losartan-hydrochlorothiazide (HYZAAR) 50-12.5 MG per tablet Take 1 tablet by mouth Daily.     • rosuvastatin (CRESTOR) 10 MG tablet Take 10 mg by mouth Every Night.  0     No current facility-administered medications for this visit.       Review of Symptoms:    Review of Systems   Constitutional: Negative for activity change (improved) and fatigue.   HENT: Negative.    Respiratory: Negative.    Cardiovascular: Negative.    Gastrointestinal: Negative.    Neurological: Negative.    Psychiatric/Behavioral: Negative for agitation, behavioral problems, dysphoric mood, sleep disturbance and stress. The patient is not nervous/anxious.          Physical Exam:   not currently breastfeeding.  Video visit    Appearance:  female stated age in no acute distress  Gait, Station, Strength: Video visit    Mental Status Exam:     Hygiene:    good  Cooperation:  Cooperative  Eye Contact:  Good  Psychomotor Behavior:  Appropriate  Affect:  Full range  Mood: normal  Hopelessness: Denies  Speech:  Normal  Thought Process:  Goal directed and Linear  Thought Content:  Normal and Mood congruent  Suicidal:  None  Homicidal:  None  Hallucinations:  None  Delusion:  None  Memory:  Intact  Orientation:  Person, Place, Time and Situation  Reliability:  good  Insight:  Good  Judgement:  Good  Impulse Control:  Good  Physical/Medical Issues:  No        Lab Results:   No visits with results within 1 Month(s) from this visit.   Latest known visit with results is:   Office Visit on 2022   Component Date Value Ref Range Status   • Reference Lab Report 2022    Final                    Value:[facility]  [address] [place] [address]  Phone: [phone] or [phone]  Fax: [phone]  Kerwin Arzola M.D., Medical Director    PATIENT NAME                                     LABORATORY NO.  [id number]   JEFF WALTON                                 [id number]  [id number]                                 AGE                    SEX   SSN              CLIENT REF #  [facility]                        67        1955      F     [id number]      [id number]    [address] [facility] 3          REQUESTING M.D.           ATTENDING M.D.         COPY TO.  SANDRA [place]                                    [place] [address]                         DATE COLLECTED            DATE RECEIVED          DATE REPORTED  2022    ThinPrep Pap with Cyty[facility] Imaging    DIAGNOSIS:  Negative for intraepithelial lesion or malignancy    Multiple factors can influence accuracy of Pap                           tests; therefore, screening at  regular intervals is necessary for early cancer detection.      SPECIMEN ADEQUACY:  SATISFACTORY FOR EVALUATION  SOURCE OF SPECIMEN:        VAGINAL  SLIDES:  1  CLINICAL HISTORY:  Smear, vaginal, as part of routine gynecological examination      CYTOTECHNOLOGIST:             HILARIA ARROYO ([facility])    CPT CODES:  64220         Assessment & Plan   Diagnoses and all orders for this visit:    1. Mild episode of recurrent major depressive disorder (HCC)  -     buPROPion XL (WELLBUTRIN XL) 300 MG 24 hr tablet; Take 1 tablet by mouth Daily.  Dispense: 90 tablet; Refill: 0  -     FLUoxetine (PROzac) 40 MG capsule; Take 1 capsule by mouth Daily.  Dispense: 90 capsule; Refill: 0    2. Generalized anxiety disorder  -     buPROPion XL (WELLBUTRIN XL) 300 MG 24 hr tablet; Take 1 tablet by mouth Daily.  Dispense: 90 tablet; Refill: 0  -     FLUoxetine (PROzac) 40 MG capsule; Take 1 capsule by mouth Daily.  Dispense: 90 capsule; Refill: 0    3. Class 1 obesity due to excess calories without serious comorbidity with body mass index (BMI) of 32.0 to 32.9 in adult  -     buPROPion XL (WELLBUTRIN XL) 300 MG 24 hr tablet; Take 1 tablet by mouth Daily.  Dispense: 90 tablet; Refill: 0    -Patient doing much better.  She is not having any medication side effects and is not having any major symptom burden at this time.  -Reviewed previous available documentation  -Reviewed most recent available labs   -Continue Prozac 40 mg p.o. daily for mood and anxiety  -Continue Wellbutrin  mg p.o. daily for mood and anxiety as well as weight  -Obesity complicates all aspects of care  -Encouraged her continued healthy eating practices and physical activity as she has had some successful weight loss  -Encouraged to consider therapy.  -Approximate appointment time 11 AM to 11:20 AM via video visit      Visit Diagnoses:    ICD-10-CM ICD-9-CM   1. Mild episode of recurrent major depressive disorder (HCC)  F33.0 296.31   2. Generalized anxiety disorder  F41.1 300.02   3. Class 1 obesity due to excess calories without serious comorbidity with body mass index (BMI) of 32.0 to 32.9 in adult   E66.09 278.00    Z68.32 V85.32       TREATMENT PLAN/GOALS: Continue supportive psychotherapy efforts and medications as indicated. Treatment and medication options discussed during today's visit. Patient acknowledged and verbally consented to continue with current treatment plan and was educated on the importance of compliance with treatment and follow-up appointments.    MEDICATION ISSUES:    Discussed medication options and treatment plan of prescribed medication as well as the risks, benefits, and side effects including potential falls, possible impaired driving and metabolic adversities among others. Patient is agreeable to call the office with any worsening of symptoms or onset of side effects. Patient is agreeable to call 911 or go to the nearest ER should he/she begin having SI/HI.     MEDS ORDERED DURING VISIT:  New Medications Ordered This Visit   Medications   • buPROPion XL (WELLBUTRIN XL) 300 MG 24 hr tablet     Sig: Take 1 tablet by mouth Daily.     Dispense:  90 tablet     Refill:  0   • FLUoxetine (PROzac) 40 MG capsule     Sig: Take 1 capsule by mouth Daily.     Dispense:  90 capsule     Refill:  0       Return in about 4 months (around 5/17/2023).             This document has been electronically signed by Sim Knight MD  January 17, 2023 11:08 EST

## 2023-01-21 ENCOUNTER — APPOINTMENT (OUTPATIENT)
Dept: MAMMOGRAPHY | Facility: HOSPITAL | Age: 68
End: 2023-01-21
Payer: MEDICARE

## 2023-02-17 ENCOUNTER — HOSPITAL ENCOUNTER (OUTPATIENT)
Dept: MAMMOGRAPHY | Facility: HOSPITAL | Age: 68
Discharge: HOME | End: 2023-02-17
Payer: MEDICARE

## 2023-02-17 ENCOUNTER — APPOINTMENT (OUTPATIENT)
Dept: OTHER | Facility: HOSPITAL | Age: 68
End: 2023-02-17
Payer: MEDICARE

## 2023-02-17 DIAGNOSIS — Z12.31 VISIT FOR SCREENING MAMMOGRAM: ICD-10-CM

## 2023-02-17 PROCEDURE — 77063 BREAST TOMOSYNTHESIS BI: CPT | Performed by: RADIOLOGY

## 2023-02-17 PROCEDURE — 77067 SCR MAMMO BI INCL CAD: CPT | Performed by: RADIOLOGY

## 2023-02-17 PROCEDURE — 77067 SCR MAMMO BI INCL CAD: CPT

## 2023-02-17 PROCEDURE — 77063 BREAST TOMOSYNTHESIS BI: CPT

## 2023-04-12 ENCOUNTER — OFFICE VISIT (OUTPATIENT)
Dept: ORTHOPEDIC SURGERY | Facility: CLINIC | Age: 68
End: 2023-04-12
Payer: MEDICARE

## 2023-04-12 VITALS
WEIGHT: 191 LBS | HEIGHT: 65 IN | SYSTOLIC BLOOD PRESSURE: 140 MMHG | BODY MASS INDEX: 31.82 KG/M2 | DIASTOLIC BLOOD PRESSURE: 94 MMHG

## 2023-04-12 DIAGNOSIS — M75.41 IMPINGEMENT SYNDROME OF RIGHT SHOULDER: Primary | ICD-10-CM

## 2023-04-12 PROCEDURE — 1160F RVW MEDS BY RX/DR IN RCRD: CPT | Performed by: ORTHOPAEDIC SURGERY

## 2023-04-12 PROCEDURE — 99213 OFFICE O/P EST LOW 20 MIN: CPT | Performed by: ORTHOPAEDIC SURGERY

## 2023-04-12 PROCEDURE — 3077F SYST BP >= 140 MM HG: CPT | Performed by: ORTHOPAEDIC SURGERY

## 2023-04-12 PROCEDURE — 1159F MED LIST DOCD IN RCRD: CPT | Performed by: ORTHOPAEDIC SURGERY

## 2023-04-12 PROCEDURE — 3080F DIAST BP >= 90 MM HG: CPT | Performed by: ORTHOPAEDIC SURGERY

## 2023-04-12 RX ORDER — SEMAGLUTIDE 0.25 MG/.5ML
0.25 INJECTION, SOLUTION SUBCUTANEOUS
COMMUNITY
Start: 2023-03-30

## 2023-04-12 RX ORDER — DULAGLUTIDE 0.75 MG/.5ML
0.75 INJECTION, SOLUTION SUBCUTANEOUS
COMMUNITY
Start: 2023-04-05

## 2023-04-12 RX ORDER — ONDANSETRON HYDROCHLORIDE 8 MG/1
TABLET, FILM COATED ORAL
COMMUNITY
Start: 2023-01-19

## 2023-04-12 NOTE — PROGRESS NOTES
[facility]    Subjective     Chief Complaint   Patient presents with   • Follow-up     4 month follow up - Impingement syndrome of right shoulder        HPI    It has been 4  month(s) since Ms. Jett's last visit. She returns to clinic today for follow-up of right shoulder pain. The issue has been ongoing for 3 year(s). She rates her pain a 6/10 on the pain scale. Previous/current treatments: NSAIDS. Current symptoms: pain and same as prior visit. The pain is worse with sleeping, working and lying on affected side; resting and pain medication and/or NSAID improve the pain. Overall, she is doing better.  Last injection provided relief up until just recently.    I have reviewed the following portions of the patient's history and agree with: History of Present Illness and Review of Systems    Patient Active Problem List   Diagnosis   • Palpitations   • Hypertension   • Hyperlipidemia   • Obesity (BMI 30.0-34.9)   • Hypercholesterolemia   • Depression   • Knee osteoarthritis   • Hypothyroid   • Insomnia   • Primary osteoarthritis of right knee   • Status post total right knee replacement   • Leukocytosis, mild, likely reactive   • Acute blood loss anemia, mild, asymptomatic   • Acute postoperative pain     Past Medical History:   Diagnosis Date   • Acute pain of left knee    • Depression     Hx of   • Dyspnea    • Edema    • Hypercholesterolemia    • Hyperlipidemia    • Hypertension    • Hypothyroid      10/14 TSH 5.320;  04/15 TSH 4.580   • Insomnia    • Knee osteoarthritis     RT. meniscal scope, Dr. Alvino Cifuentes, 01/15. removal of ganglion cyst 01/15   • Obesity (BMI 30.0-34.9)     BMI 33.66   • Palpitations    • Periarthritis of shoulder 2020   • Wears glasses     READING      Past Surgical History:   Procedure Laterality Date   • APPENDECTOMY     • BLADDER SURGERY     • BREAST BIOPSY Left    • COLONOSCOPY  2017   • GANGLION CYST EXCISION  01/2015    R KNEE   • HYSTERECTOMY     •  INCISIONAL BREAST BIOPSY     • JOINT REPLACEMENT     • KNEE ARTHROSCOPY Right    • OOPHORECTOMY     • TONSILLECTOMY AND ADENOIDECTOMY     • TOTAL ABDOMINAL HYSTERECTOMY WITH SALPINGO OOPHORECTOMY     • TOTAL KNEE ARTHROPLASTY Right 2019    Procedure: TOTAL KNEE ARTHROPLASTY RIGHT;  Surgeon: Medardo Cosme MD;  Location: [facility];  Service: Orthopedics      Family History   Problem Relation Age of Onset   • Heart attack Mother         Acute MI   • Hypertension Mother    • Stroke Mother    • Depression Mother    • Heart disease Mother    • Osteoarthritis Mother    • Osteoporosis Mother    • Heart attack Father 63           • Heart disease Father    • Hypertension Father    • Anxiety disorder Daughter    • Depression Daughter    • Depression Other    • Heart disease Other    • Hypertension Other    • Breast cancer Neg Hx    • Ovarian cancer Neg Hx      Social History     Socioeconomic History   • Marital status:    • Number of children: 1   • Highest education level: Master's degree (e.g., MA, MS, Misty, MEd, MSW, SIMON)   Tobacco Use   • Smoking status: Never   • Smokeless tobacco: Never   Vaping Use   • Vaping Use: Never used   Substance and Sexual Activity   • Alcohol use: No   • Drug use: No   • Sexual activity: Yes     Partners: Male     Birth control/protection: Surgical, Post-menopausal      Current Outpatient Medications on File Prior to Visit   Medication Sig Dispense Refill   • aspirin 81 MG EC tablet Take 1 tablet by mouth Daily. Resume in 1 month     • buPROPion XL (WELLBUTRIN XL) 300 MG 24 hr tablet Take 1 tablet by mouth Daily. 90 tablet 0   • Dulaglutide (Trulicity) 0.75 MG/0.5ML solution pen-injector Inject 0.75 mg under the skin into the appropriate area as directed Every 7 (Seven) Days.     • estradiol (ESTRACE) 1 MG tablet Take 1 tablet by mouth Daily. 90 tablet 3   • FLUoxetine (PROzac) 40 MG capsule Take 1 capsule by mouth Daily. 90 capsule 0   • hydroCHLOROthiazide (HYDRODIURIL)  12.5 MG tablet      • losartan (COZAAR) 50 MG tablet      • losartan-hydrochlorothiazide (HYZAAR) 50-12.5 MG per tablet Take 1 tablet by mouth Daily.     • ondansetron (ZOFRAN) 8 MG tablet      • rosuvastatin (CRESTOR) 10 MG tablet Take 1 tablet by mouth Every Night.  0   • Semaglutide-Weight Management (Wegovy) 0.25 MG/0.5ML solution auto-injector Inject 0.25 mg under the skin into the appropriate area as directed Every 7 (Seven) Days.       No current facility-administered medications on file prior to visit.      Allergies   Allergen Reactions   • Benadryl [Diphenhydramine] Other (See Comments)     SLEEPY   • Adhesive Tape Rash   • Percocet [Oxycodone-Acetaminophen] Other (See Comments)     SEVERE ITCHING IN HEAD        Review of Systems   Constitutional: Negative for activity change, appetite change, chills, diaphoresis, fatigue, fever and unexpected weight change.   HENT: Negative for congestion, dental problem, drooling, ear discharge, ear pain, facial swelling, hearing loss, mouth sores, nosebleeds, postnasal drip, rhinorrhea, sinus pressure, sneezing, sore throat, tinnitus, trouble swallowing and voice change.    Eyes: Negative for photophobia, pain, discharge, redness, itching and visual disturbance.   Respiratory: Negative for apnea, cough, choking, chest tightness, shortness of breath, wheezing and stridor.    Cardiovascular: Negative for chest pain, palpitations and leg swelling.   Gastrointestinal: Negative for abdominal distention, abdominal pain, anal bleeding, blood in stool, constipation, diarrhea, nausea, rectal pain and vomiting.   Endocrine: Negative for cold intolerance, heat intolerance, polydipsia, polyphagia and polyuria.   Genitourinary: Negative for decreased urine volume, difficulty urinating, dysuria, enuresis, flank pain, frequency, genital sores, hematuria and urgency.   Musculoskeletal: Positive for arthralgias. Negative for back pain, gait problem, joint swelling, myalgias, neck pain  "and neck stiffness.   Skin: Negative for color change, pallor, rash and wound.   Allergic/Immunologic: Negative for environmental allergies, food allergies and immunocompromised state.   Neurological: Negative for dizziness, tremors, seizures, syncope, facial asymmetry, speech difficulty, weakness, light-headedness, numbness and headaches.   Hematological: Negative for adenopathy. Does not bruise/bleed easily.   Psychiatric/Behavioral: Negative for agitation, behavioral problems, confusion, decreased concentration, dysphoric mood, hallucinations, self-injury, sleep disturbance and suicidal ideas. The patient is not nervous/anxious and is not hyperactive.         Objective      Physical Exam  /94   Ht 165.1 cm (65\")   Wt 86.6 kg (191 lb)   BMI 31.78 kg/m²     Body mass index is 31.78 kg/m².    General:   Mental Status:  Alert   Appearance: Cooperative, in no acute distress   Build and Nutrition: Well-nourished well-developed female   Orientation: Alert and oriented to person, place and time   Posture: Normal   Gait: Normal     Upper Extremities:              Right Shoulder:                          Tenderness:    None                          Swelling:          None                          Range of motion:        External rotation:         60°                                                              Forward flexion:          170°                                                              Abduction:                   170°  Deformities:     None  Functional testing: Negative drop arm, negative lift-off, mildly positive impingement    Imaging/Studies  Imaging Results (Last 24 Hours)     Procedure Component Value Units Date/Time    XR Shoulder 2+ View Right [[id number]] Resulted: 04/12/23 0958     Updated: 04/12/23 0959    Narrative:      Right Shoulder Radiographs  Indication: right shoulder pain  Views: AP, outlet and axillary views of the right shoulder    Comparison: 8/25/2021    Findings:  Sclerosis " at the greater tuberosity, with no acute bony abnormalities.  No   significant change compared to the previous imaging.  Good alignment.              Assessment and Plan     Diagnoses and all orders for this visit:    1. Impingement syndrome of right shoulder (Primary)  -     XR Shoulder 2+ View Right  -     MRI Shoulder Right Without Contrast; Future        1. Impingement syndrome of right shoulder        I reviewed my findings with the patient.  Due to her persistent symptoms, I would like to get an MRI before proceeding forward with further treatment.  I will see her back after the MRI for review, but sooner for any problems.    Return for After Imaging Study.      Medardo Cosme MD  04/12/23  10:06 EDT

## 2023-04-21 ENCOUNTER — HOSPITAL ENCOUNTER (OUTPATIENT)
Dept: MRI IMAGING | Facility: HOSPITAL | Age: 68
Discharge: HOME | End: 2023-04-21
Payer: MEDICARE

## 2023-04-21 DIAGNOSIS — M75.41 IMPINGEMENT SYNDROME OF RIGHT SHOULDER: ICD-10-CM

## 2023-04-21 PROCEDURE — 73221 MRI JOINT UPR EXTREM W/O DYE: CPT

## 2023-05-10 ENCOUNTER — OFFICE VISIT (OUTPATIENT)
Dept: ORTHOPEDIC SURGERY | Facility: CLINIC | Age: 68
End: 2023-05-10
Payer: MEDICARE

## 2023-05-10 VITALS
HEIGHT: 65 IN | SYSTOLIC BLOOD PRESSURE: 121 MMHG | BODY MASS INDEX: 31.65 KG/M2 | WEIGHT: 190 LBS | DIASTOLIC BLOOD PRESSURE: 81 MMHG

## 2023-05-10 DIAGNOSIS — M75.41 ROTATOR CUFF IMPINGEMENT SYNDROME, RIGHT: Primary | ICD-10-CM

## 2023-05-10 PROCEDURE — 3074F SYST BP LT 130 MM HG: CPT | Performed by: ORTHOPAEDIC SURGERY

## 2023-05-10 PROCEDURE — 99213 OFFICE O/P EST LOW 20 MIN: CPT | Performed by: ORTHOPAEDIC SURGERY

## 2023-05-10 PROCEDURE — 1159F MED LIST DOCD IN RCRD: CPT | Performed by: ORTHOPAEDIC SURGERY

## 2023-05-10 PROCEDURE — 3079F DIAST BP 80-89 MM HG: CPT | Performed by: ORTHOPAEDIC SURGERY

## 2023-05-10 PROCEDURE — 1160F RVW MEDS BY RX/DR IN RCRD: CPT | Performed by: ORTHOPAEDIC SURGERY

## 2023-05-10 RX ORDER — LOSARTAN POTASSIUM AND HYDROCHLOROTHIAZIDE 12.5; 1 MG/1; MG/1
TABLET ORAL
COMMUNITY
Start: 2023-04-18

## 2023-05-10 NOTE — PROGRESS NOTES
[facility] [place] Orthopaedic Surgery Clinic Note    Subjective     Chief Complaint   Patient presents with   • Follow-up     1 month MRI (4/21/23) follow up - Impingement syndrome of right shoulder         HPI    It has been 1  month(s) since Ms. Jett's last visit. She returns to clinic today for follow-up of right shoulder pain. The issue has been ongoing for 2 year(s). She rates her pain a 7/10 on the pain scale. Previous/current treatments: NSAIDS. Current symptoms: pain and same as prior visit. The pain is worse with sleeping and lying on affected side; resting and pain medication and/or NSAID improve the pain. Overall, she is doing the same.  Here today to review the MRI results.    I have reviewed the following portions of the patient's history and agree with: History of Present Illness and Review of Systems    Patient Active Problem List   Diagnosis   • Palpitations   • Hypertension   • Hyperlipidemia   • Obesity (BMI 30.0-34.9)   • Hypercholesterolemia   • Depression   • Knee osteoarthritis   • Hypothyroid   • Insomnia   • Primary osteoarthritis of right knee   • Status post total right knee replacement   • Leukocytosis, mild, likely reactive   • Acute blood loss anemia, mild, asymptomatic   • Acute postoperative pain     Past Medical History:   Diagnosis Date   • Acute pain of left knee    • Depression     Hx of   • Dyspnea    • Edema    • Hypercholesterolemia    • Hyperlipidemia    • Hypertension    • Hypothyroid      10/14 TSH 5.320;  04/15 TSH 4.580   • Insomnia    • Knee osteoarthritis     RT. meniscal scope, Dr. Alvino Cifuentes, 01/15. removal of ganglion cyst 01/15   • Obesity (BMI 30.0-34.9)     BMI 33.66   • Palpitations    • Periarthritis of shoulder 2020   • Wears glasses     READING      Past Surgical History:   Procedure Laterality Date   • APPENDECTOMY     • BLADDER SURGERY     • BREAST BIOPSY Left    • COLONOSCOPY  2017   • GANGLION CYST EXCISION  01/2015    R KNEE   • HYSTERECTOMY     • INCISIONAL  BREAST BIOPSY     • JOINT REPLACEMENT     • KNEE ARTHROSCOPY Right    • OOPHORECTOMY     • TONSILLECTOMY AND ADENOIDECTOMY     • TOTAL ABDOMINAL HYSTERECTOMY WITH SALPINGO OOPHORECTOMY     • TOTAL KNEE ARTHROPLASTY Right 2019    Procedure: TOTAL KNEE ARTHROPLASTY RIGHT;  Surgeon: Medardo Cosme MD;  Location: [facility];  Service: Orthopedics      Family History   Problem Relation Age of Onset   • Heart attack Mother         Acute MI   • Hypertension Mother    • Stroke Mother    • Depression Mother    • Heart disease Mother    • Osteoarthritis Mother    • Osteoporosis Mother    • Heart attack Father 63           • Heart disease Father    • Hypertension Father    • Anxiety disorder Daughter    • Depression Daughter    • Depression Other    • Heart disease Other    • Hypertension Other    • Breast cancer Neg Hx    • Ovarian cancer Neg Hx      Social History     Socioeconomic History   • Marital status:    • Number of children: 1   • Highest education level: Master's degree (e.g., MA, MS, [place], MEd, MSW, SIMON)   Tobacco Use   • Smoking status: Never   • Smokeless tobacco: Never   Vaping Use   • Vaping Use: Never used   Substance and Sexual Activity   • Alcohol use: No   • Drug use: No   • Sexual activity: Yes     Partners: Male     Birth control/protection: Post-menopausal, Hysterectomy      Current Outpatient Medications on File Prior to Visit   Medication Sig Dispense Refill   • aspirin 81 MG EC tablet Take 1 tablet by mouth Daily. Resume in 1 month     • buPROPion XL (WELLBUTRIN XL) 300 MG 24 hr tablet Take 1 tablet by mouth Daily. 90 tablet 0   • Diclofenac Sodium (VOLTAREN) 1 % gel gel Apply 2 G QID to affected area     • estradiol (ESTRACE) 1 MG tablet Take 1 tablet by mouth Daily. 90 tablet 3   • FLUoxetine (PROzac) 40 MG capsule Take 1 capsule by mouth Daily. 90 capsule 0   • losartan-hydrochlorothiazide (HYZAAR) 100-12.5 MG per tablet      • rosuvastatin (CRESTOR) 10 MG tablet Take 1 tablet by  mouth Every Night.  0   • [DISCONTINUED] Dulaglutide (Trulicity) 0.75 MG/0.5ML solution pen-injector Inject 0.75 mg under the skin into the appropriate area as directed Every 7 (Seven) Days.     • [DISCONTINUED] hydroCHLOROthiazide (HYDRODIURIL) 12.5 MG tablet      • [DISCONTINUED] losartan (COZAAR) 50 MG tablet      • [DISCONTINUED] losartan-hydrochlorothiazide (HYZAAR) 50-12.5 MG per tablet Take 1 tablet by mouth Daily.     • [DISCONTINUED] ondansetron (ZOFRAN) 8 MG tablet      • [DISCONTINUED] Semaglutide-Weight Management (Wegovy) 0.25 MG/0.5ML solution auto-injector Inject 0.25 mg under the skin into the appropriate area as directed Every 7 (Seven) Days.       No current facility-administered medications on file prior to visit.      Allergies   Allergen Reactions   • Benadryl [Diphenhydramine] Other (See Comments)     SLEEPY   • Adhesive Tape Rash   • Percocet [Oxycodone-Acetaminophen] Other (See Comments)     SEVERE ITCHING IN HEAD        Review of Systems   Constitutional: Negative for activity change, appetite change, chills, diaphoresis, fatigue, fever and unexpected weight change.   HENT: Negative for congestion, dental problem, drooling, ear discharge, ear pain, facial swelling, hearing loss, mouth sores, nosebleeds, postnasal drip, rhinorrhea, sinus pressure, sneezing, sore throat, tinnitus, trouble swallowing and voice change.    Eyes: Negative for photophobia, pain, discharge, redness, itching and visual disturbance.   Respiratory: Negative for apnea, cough, choking, chest tightness, shortness of breath, wheezing and stridor.    Cardiovascular: Negative for chest pain, palpitations and leg swelling.   Gastrointestinal: Negative for abdominal distention, abdominal pain, anal bleeding, blood in stool, constipation, diarrhea, nausea, rectal pain and vomiting.   Endocrine: Negative for cold intolerance, heat intolerance, polydipsia, polyphagia and polyuria.   Genitourinary: Negative for decreased urine  "volume, difficulty urinating, dysuria, enuresis, flank pain, frequency, genital sores, hematuria and urgency.   Musculoskeletal: Positive for arthralgias. Negative for back pain, gait problem, joint swelling, myalgias, neck pain and neck stiffness.   Skin: Negative for color change, pallor, rash and wound.   Allergic/Immunologic: Negative for environmental allergies, food allergies and immunocompromised state.   Neurological: Negative for dizziness, tremors, seizures, syncope, facial asymmetry, speech difficulty, weakness, light-headedness, numbness and headaches.   Hematological: Negative for adenopathy. Does not bruise/bleed easily.   Psychiatric/Behavioral: Negative for agitation, behavioral problems, confusion, decreased concentration, dysphoric mood, hallucinations, self-injury, sleep disturbance and suicidal ideas. The patient is not nervous/anxious and is not hyperactive.         Objective      Physical Exam  /81   Ht 165.1 cm (65\")   Wt 86.2 kg (190 lb)   BMI 31.62 kg/m²     Body mass index is 31.62 kg/m².    General:   Mental Status:  Alert   Appearance: Cooperative, in no acute distress   Build and Nutrition: Well-nourished female   Orientation: Alert and oriented to person, place and time   Posture: Normal   Gait: Nonantalgic    Upper Extremities:              Right Shoulder:                          Tenderness:    None                          Swelling:          None                          Range of motion:        External rotation:         60°                                                              Forward flexion:          160°                                                              Abduction:                   150°  Deformities:     None  Functional testing: Weak drop arm, positive impingement    Imaging/Studies  Imaging Results (Last 24 Hours)     ** No results found for the last 24 hours. **        MRI SHOULDER RIGHT WO CONTRAST     Date of Exam: 4/21/2023 12:50 PM " EDT     Indication: Shoulder pain, bursitis suspected, xray done  Shoulder pain, rotator cuff disorder suspected, xray done.     Comparison: Shoulder radiographs 4/12/2023     Technique:  Routine multiplanar/multisequence images of the right shoulder were obtained without contrast administration.          Findings:  Acromioclavicular joint: Mild degenerative changes of the acromioclavicular joint. Mild subacromial/subdeltoid bursal fluid/edema. Type II acromion. Os acromiale is absent.     Rotator cuff: Tendinopathy with focal full-thickness tear of the posterior aspect of the supraspinatus tendon. Tendinopathy with low-grade interstitial partial tears of the superior aspect of the infraspinatus tendon. Tendinopathy of the subscapularis   tendon. No tear or substantial tendinopathy of the teres minor tendon. No fatty atrophy or edematous changes of the the rotator cuff muscles.     Labrum: Circumferential degeneration and tearing of the labrum predominantly the posterior aspect.     Biceps tendon: Intra-articular biceps tendinopathy.     Joint: No substantial glenohumeral effusion. Thinning of the glenohumeral articular cartilage. No subchondral edema. No evidence of capsulitis.     Bones:  No fracture or marrow edema. No suspicious marrow replacing lesions.     Soft tissues: No soft tissue masses or fluid collections seen. Nonspecific focal atrophy of a posterior slip of the deltoid muscle. Mild subcoracoid bursitis.     IMPRESSION:  Impression:  Rotator cuff tendinopathy with focal full-thickness tear of the distal posterior supraspinatus tendon. Additionally there is low-grade interstitial partial tear of the superior aspect of the mid infraspinatus tendon. No focal muscular atrophy.     Intra-articular biceps tendinopathy.     Mild subcoracoid bursitis.     Mild degenerative change of the glenohumeral and acromioclavicular joints.           Electronically Signed: Sanchez Silva    4/21/2023 3:55 PM EDT     Workstation ID: [id number]    I reviewed the above imaging, and agree with findings.    Assessment and Plan     Diagnoses and all orders for this visit:    1. Rotator cuff impingement syndrome, right (Primary)  -     Ambulatory Referral to Physical Therapy Evaluate and treat, Ortho        1. Rotator cuff impingement syndrome, right        I reviewed my findings with the patient.  We discussed options, and I would like for her to see Dr. Castanon for consideration of surgical intervention for her shoulder.  In the meantime, we will try some physical therapy, and a referral was provided.    Return for Referral to Dr. Castanon.      Medardo Cosme MD  05/10/23  09:55 EDT

## 2023-05-23 ENCOUNTER — OFFICE VISIT (OUTPATIENT)
Dept: ORTHOPEDIC SURGERY | Facility: CLINIC | Age: 68
End: 2023-05-23
Payer: MEDICARE

## 2023-05-23 VITALS
WEIGHT: 182 LBS | DIASTOLIC BLOOD PRESSURE: 70 MMHG | BODY MASS INDEX: 30.32 KG/M2 | SYSTOLIC BLOOD PRESSURE: 140 MMHG | HEIGHT: 65 IN

## 2023-05-23 DIAGNOSIS — M75.41 ROTATOR CUFF IMPINGEMENT SYNDROME, RIGHT: ICD-10-CM

## 2023-05-23 DIAGNOSIS — M75.41 IMPINGEMENT SYNDROME OF RIGHT SHOULDER: ICD-10-CM

## 2023-05-23 DIAGNOSIS — M75.121 NONTRAUMATIC COMPLETE TEAR OF RIGHT ROTATOR CUFF: Primary | ICD-10-CM

## 2023-05-23 RX ORDER — TRIAMCINOLONE ACETONIDE 40 MG/ML
40 INJECTION, SUSPENSION INTRA-ARTICULAR; INTRAMUSCULAR
Status: COMPLETED | OUTPATIENT
Start: 2023-05-23 | End: 2023-05-23

## 2023-05-23 RX ORDER — LIDOCAINE HYDROCHLORIDE 10 MG/ML
5 INJECTION, SOLUTION EPIDURAL; INFILTRATION; INTRACAUDAL; PERINEURAL
Status: COMPLETED | OUTPATIENT
Start: 2023-05-23 | End: 2023-05-23

## 2023-05-23 RX ADMIN — LIDOCAINE HYDROCHLORIDE 5 ML: 10 INJECTION, SOLUTION EPIDURAL; INFILTRATION; INTRACAUDAL; PERINEURAL at 10:02

## 2023-05-23 RX ADMIN — TRIAMCINOLONE ACETONIDE 40 MG: 40 INJECTION, SUSPENSION INTRA-ARTICULAR; INTRAMUSCULAR at 10:02

## 2023-05-23 NOTE — PROGRESS NOTES
Procedure   - Large Joint Arthrocentesis: R subacromial bursa on 5/23/2023 10:02 AM  Indications: pain  Details: 21 G needle, posterior approach  Medications: 5 mL lidocaine PF 1% 1 %; 40 mg triamcinolone acetonide 40 MG/ML  Outcome: tolerated well, no immediate complications  Procedure, treatment alternatives, risks and benefits explained, specific risks discussed. Consent was given by the patient. Immediately prior to procedure a time out was called to verify the correct patient, procedure, equipment, support staff and site/side marked as required. Patient was prepped and draped in the usual sterile fashion.

## 2023-05-23 NOTE — PROGRESS NOTES
[facility] [place] Orthopaedic Surgery Office Visit - Gilbert Castanon MD    Office Visit       Patient Name: Germaine Jett    Chief Complaint:   Chief Complaint   Patient presents with   • Right Shoulder - Pain       Referring Physician: Dr. Cosme -- I appreciate the referral    History of Present Illness:   Germaine Jett is a 68 y.o. female who presents with right body part: shoulder Reason: pain.  Onset:Onset: atraumatic and gradual in nature. The issue has been ongoing for 2 year(s). Pain is a 7/10 on the pain scale. Pain is described as Pain Characterization: dull and aching. Associated symptoms include Symptoms: pain. The pain is worse with sleeping, working, leisure and lying on affected side; resting and pain medication and/or NSAID improve the pain. Previous treatments have included: steroid injection (last injection 12/21/22). I have reviewed the patient's history of present illness as noted/entered above.    I have reviewed the patient's past medical history, surgical history, social history, family history, medications, and allergies as noted in the electronic medical record and as noted/entered.  I have reviewed the patient's review of systems as noted/enter and updated as noted in the patient's HPI.      RIGHT SHOULDER  2 years of pain off/on  Full-thickness tearing noted on recent MRI.  Daughter- YAMILKA Delgadillo  She loves taking care of her 6-year-old granddaughter and 3-year-old grandson  She remains very active  Counseled on operative versus nonoperative measures and desires to hold on operative measures at this time which is reasonable.  She understands tear progression can occur over time but she has been dealing with this off and on for more than 2 years    Shots have helped -- last was 12/21/2022  Alonso    Friends with Cora Jara      68 y.o. female  Body mass index is 30.29 kg/m².    Subjective   Subjective      Review of  Systems   Constitutional: Negative.  Negative for chills, fatigue and fever.   HENT: Negative.  Negative for congestion and dental problem.    Eyes: Negative.  Negative for blurred vision.   Respiratory: Negative.  Negative for shortness of breath.    Cardiovascular: Negative.  Negative for leg swelling.   Gastrointestinal: Negative.  Negative for abdominal pain.   Endocrine: Negative.  Negative for polyuria.   Genitourinary: Negative.  Negative for difficulty urinating.   Musculoskeletal: Positive for arthralgias.   Skin: Negative.    Allergic/Immunologic: Negative.    Neurological: Negative.    Hematological: Negative.  Negative for adenopathy.   Psychiatric/Behavioral: Negative.  Negative for behavioral problems.        Past Medical History:   Past Medical History:   Diagnosis Date   • Acute pain of left knee    • Depression     Hx of   • Dyspnea    • Edema    • Frozen shoulder 2021   • Hypercholesterolemia    • Hyperlipidemia    • Hypertension    • Hypothyroid      10/14 TSH 5.320;  04/15 TSH 4.580   • Insomnia    • Knee osteoarthritis     RT. meniscal scope, Dr. Alvino Cifuentes, 01/15. removal of ganglion cyst 01/15   • Obesity (BMI 30.0-34.9)     BMI 33.66   • Palpitations    • Periarthritis of shoulder 2020   • Wears glasses     READING       Past Surgical History:   Past Surgical History:   Procedure Laterality Date   • APPENDECTOMY     • BLADDER SURGERY     • BREAST BIOPSY Left    • COLONOSCOPY  2017   • GANGLION CYST EXCISION  01/2015    R KNEE   • HYSTERECTOMY     • INCISIONAL BREAST BIOPSY     • JOINT REPLACEMENT     • KNEE ARTHROSCOPY Right    • OOPHORECTOMY     • TONSILLECTOMY AND ADENOIDECTOMY     • TOTAL ABDOMINAL HYSTERECTOMY WITH SALPINGO OOPHORECTOMY     • TOTAL KNEE ARTHROPLASTY Right 09/26/2019    Procedure: TOTAL KNEE ARTHROPLASTY RIGHT;  Surgeon: Medardo Cosme MD;  [facility];  Service: Orthopedics       Family History:   Family History   Problem Relation Age of Onset   • Heart attack  Mother         Acute MI   • Hypertension Mother    • Stroke Mother    • Depression Mother    • Heart disease Mother    • Osteoarthritis Mother    • Osteoporosis Mother    • Heart attack Father 63           • Heart disease Father    • Hypertension Father    • Anxiety disorder Daughter    • Depression Daughter    • Depression Other    • Heart disease Other    • Hypertension Other    • Breast cancer Neg Hx    • Ovarian cancer Neg Hx        Social History:   Social History     Socioeconomic History   • Marital status:    • Number of children: 1   • Highest education level: Master's degree (e.g., MA, MS, [place], MEd, MSW, SIMON)   Tobacco Use   • Smoking status: Never   • Smokeless tobacco: Never   Vaping Use   • Vaping Use: Never used   Substance and Sexual Activity   • Alcohol use: No   • Drug use: No   • Sexual activity: Yes     Partners: Male     Birth control/protection: Post-menopausal, Hysterectomy       Medications:   Current Outpatient Medications:   •  aspirin 81 MG EC tablet, Take 1 tablet by mouth Daily. Resume in 1 month, Disp: , Rfl:   •  buPROPion XL (WELLBUTRIN XL) 300 MG 24 hr tablet, Take 1 tablet by mouth Daily., Disp: 90 tablet, Rfl: 0  •  Diclofenac Sodium (VOLTAREN) 1 % gel gel, Apply 2 G QID to affected area, Disp: , Rfl:   •  estradiol (ESTRACE) 1 MG tablet, Take 1 tablet by mouth Daily., Disp: 90 tablet, Rfl: 3  •  FLUoxetine (PROzac) 40 MG capsule, Take 1 capsule by mouth Daily., Disp: 90 capsule, Rfl: 0  •  losartan-hydrochlorothiazide (HYZAAR) 100-12.5 MG per tablet, , Disp: , Rfl:   •  rosuvastatin (CRESTOR) 10 MG tablet, Take 1 tablet by mouth Every Night., Disp: , Rfl: 0    Allergies:   Allergies   Allergen Reactions   • Benadryl [Diphenhydramine] Other (See Comments)     SLEEPY   • Adhesive Tape Rash   • Percocet [Oxycodone-Acetaminophen] Other (See Comments)     SEVERE ITCHING IN HEAD       The following portions of the patient's history were reviewed and updated as appropriate:  "allergies, current medications, past family history, past medical history, past social history, past surgical history and problem list.        Objective    Objective      Vital Signs:   Vitals:    05/23/23 0926   BP: 140/70   Weight: 82.6 kg (182 lb)   Height: 165.1 cm (65\")       Ortho Exam:  Right shoulder remains very stoic she does have weakness with Jobes testing as anticipated but good external rotation strength.  Some dysrhythmic motion but is able to go overhead 130/130/45/80/80    Minimal biceps symptoms positive Neer positive Flores    Results Review:   Imaging Results (Last 24 Hours)     ** No results found for the last 24 hours. **        MRI Shoulder Right Without Contrast    Result Date: 4/21/2023  Impression: Rotator cuff tendinopathy with focal full-thickness tear of the distal posterior supraspinatus tendon. Additionally there is low-grade interstitial partial tear of the superior aspect of the mid infraspinatus tendon. No focal muscular atrophy. Intra-articular biceps tendinopathy. Mild subcoracoid bursitis. Mild degenerative change of the glenohumeral and acromioclavicular joints. Electronically Signed: Sanchez Silva  4/21/2023 3:55 PM EDT  Workstation ID: [id number]      I reviewed the MRI above and right shoulder 3 views 4/12/2023 within the system.  She has full-thickness fairly medialized tearing of the supraspinatus with a lateral stump of tissue remaining on the greater tuberosity.  She does have positive tangent sign indicative of supraspinatus fatty infiltration/atrophy of the muscle    Procedures     Right shoulder - SHOULDER SUBACROMIAL SPACE INJECTION: Risks and benefits of a shoulder subacromial space injection were discussed and the patient desired to proceed. Verbal consent was obtained. The patient understood the risk of infection, potential skin changes, bump in blood glucose especially with diabetes, nerve injury, possibility of increased pain in the short term, and possible " incomplete pain relief.  Using sterile technique, the shoulder subacromial space was injected from a posterior approach with 1mL of 40 mg triamcinolone acetonide 40 MG/ML and 4cc of lidocaine with aspiration prior to injection. The patient tolerated the procedure without difficulty.  CPT CODE 08372 for major joint aspiration/injection          Assessment / Plan      Assessment/Plan:   Problem List Items Addressed This Visit        Musculoskeletal and Injuries    Nontraumatic complete tear of right rotator cuff - Primary    Rotator cuff impingement syndrome, right    Impingement syndrome of right shoulder       Right shoulder counseled on operative versus nonoperative measures.  Her treatment option would include arthroscopic rotator cuff repair if and when she desired.  She desires to hold on the surgical intervention at this time which is reasonable.  She does understand that tear progression can occur over time and tears can become irreparable over time however she remains very stoic and has reasonable function at this time.  She notes she has responded very well to prior injections in the past.    Follow Up: 3 months to reassess, she will update me sooner if issues arise.        Gilbert Castanon MD, FAAOS  Orthopedic Surgeon  Fellowship Trained Shoulder and Elbow Surgeon  [facility]  Orthopedics and Sports Medicine  [address] [place] [address], [address]  [place] [address]    05/23/23  10:04 EDT

## 2023-08-29 ENCOUNTER — TELEMEDICINE (OUTPATIENT)
Dept: PSYCHIATRY | Facility: CLINIC | Age: 68
End: 2023-08-29
Payer: MEDICARE

## 2023-08-29 DIAGNOSIS — E66.09 CLASS 1 OBESITY DUE TO EXCESS CALORIES WITHOUT SERIOUS COMORBIDITY WITH BODY MASS INDEX (BMI) OF 32.0 TO 32.9 IN ADULT: ICD-10-CM

## 2023-08-29 DIAGNOSIS — F41.1 GENERALIZED ANXIETY DISORDER: ICD-10-CM

## 2023-08-29 DIAGNOSIS — F33.0 MILD EPISODE OF RECURRENT MAJOR DEPRESSIVE DISORDER: ICD-10-CM

## 2023-08-29 RX ORDER — FLUOXETINE HYDROCHLORIDE 40 MG/1
40 CAPSULE ORAL DAILY
Qty: 90 CAPSULE | Refills: 0 | Status: SHIPPED | OUTPATIENT
Start: 2023-08-29

## 2023-08-29 RX ORDER — BUPROPION HYDROCHLORIDE 300 MG/1
300 TABLET ORAL DAILY
Qty: 90 TABLET | Refills: 0 | Status: SHIPPED | OUTPATIENT
Start: 2023-08-29

## 2023-08-29 NOTE — PROGRESS NOTES
Subjective   Germaine Jett is a 68 y.o. female who presents today for follow up    Chief Complaint:  Depression and Anxiety    This provider is located at [facility], [address] [place] [address]. The Patient is seen remotely at home, using Epic Mychart. Patient is being seen via telehealth and stated they are in a secure environment for this session. The patient's condition being diagnosed/treated is appropriate for telemedicine. The provider identified himself as well as his credentials.   The patient gave consent to be seen remotely, and when consent is given they understand that the consent allows for patient identifiable information to be sent to a third party as needed.   They may refuse to be seen remotely at any time. The electronic data is encrypted and password protected, and the patient has been advised of the potential risks to privacy not withstanding such measures      History of Present Illness: Patient presenting today for follow-up.  Since last visit, she has been doing fairly well and reports that she was reliably on her medication for a good chunk of time since last visit but she did start to forget with other life obligations and with things going smooth and was not taking any of her medications, even her antihypertensives for a brief period of time.  She did note that her irritability and frustration tolerance is a little higher and her fatigue and motivation has been low.  She has been back on Prozac for 5 days now and plans to restart the Wellbutrin after she has been on it for at least a week as we had discussed previously when this had occurred before.  She is not having medication side effects when she takes the medication.  She has been helping out with her grandkids.  She denies SI/HI/AVH.    The following portions of the patient's history were reviewed and updated as appropriate: allergies, current medications, past family history, past medical history, past social  history, past surgical history and problem list.      Past Medical History:  Past Medical History:   Diagnosis Date    Acute pain of left knee     Depression     Hx of    Dyspnea     Edema     Frozen shoulder     Hypercholesterolemia     Hyperlipidemia     Hypertension     Hypothyroid      10/14 TSH 5.320;  04/15 TSH 4.580    Insomnia     Knee osteoarthritis     RT. meniscal scope, Dr. Alvino Cifuentes, 01/15. removal of ganglion cyst 01/15    Obesity (BMI 30.0-34.9)     BMI 33.66    Palpitations     Periarthritis of shoulder     Wears glasses     READING       Social History:  Social History     Socioeconomic History    Marital status:     Number of children: 1    Highest education level: Master's degree (e.g., MA, MS, Misty, MEd, MSW, SIMON)   Tobacco Use    Smoking status: Never    Smokeless tobacco: Never   Vaping Use    Vaping Use: Never used   Substance and Sexual Activity    Alcohol use: No    Drug use: No    Sexual activity: Yes     Partners: Male     Birth control/protection: Post-menopausal, Hysterectomy       Family History:  Family History   Problem Relation Age of Onset    Heart attack Mother         Acute MI    Hypertension Mother     Stroke Mother     Depression Mother     Heart disease Mother     Osteoarthritis Mother     Osteoporosis Mother     Heart attack Father 63            Heart disease Father     Hypertension Father     Anxiety disorder Daughter     Depression Daughter     Depression Other     Heart disease Other     Hypertension Other     Breast cancer Neg Hx     Ovarian cancer Neg Hx        Past Surgical History:  Past Surgical History:   Procedure Laterality Date    APPENDECTOMY      BLADDER SURGERY      BREAST BIOPSY Left     COLONOSCOPY  2017    GANGLION CYST EXCISION  2015    R KNEE    HYSTERECTOMY      INCISIONAL BREAST BIOPSY      JOINT REPLACEMENT      KNEE ARTHROSCOPY Right     OOPHORECTOMY      TONSILLECTOMY AND ADENOIDECTOMY      TOTAL ABDOMINAL HYSTERECTOMY WITH  SALPINGO OOPHORECTOMY      TOTAL KNEE ARTHROPLASTY Right 09/26/2019    Procedure: TOTAL KNEE ARTHROPLASTY RIGHT;  Surgeon: Medardo Cosme MD;  [facility];  Service: Orthopedics       Problem List:  Patient Active Problem List   Diagnosis    Palpitations    Hypertension    Hyperlipidemia    Obesity (BMI 30.0-34.9)    Hypercholesterolemia    Depression    Knee osteoarthritis    Hypothyroid    Insomnia    Primary osteoarthritis of right knee    Status post total right knee replacement    Leukocytosis, mild, likely reactive    Acute blood loss anemia, mild, asymptomatic    Acute postoperative pain    Nontraumatic complete tear of right rotator cuff    Rotator cuff impingement syndrome, right    Impingement syndrome of right shoulder       Allergy:   Allergies   Allergen Reactions    Benadryl [Diphenhydramine] Other (See Comments)     SLEEPY    Adhesive Tape Rash    Percocet [Oxycodone-Acetaminophen] Other (See Comments)     SEVERE ITCHING IN HEAD        Current Medications:   Current Outpatient Medications   Medication Sig Dispense Refill    aspirin 81 MG EC tablet Take 1 tablet by mouth Daily. Resume in 1 month      buPROPion XL (WELLBUTRIN XL) 300 MG 24 hr tablet Take 1 tablet by mouth Daily. 90 tablet 0    Diclofenac Sodium (VOLTAREN) 1 % gel gel Apply 2 G QID to affected area      estradiol (ESTRACE) 1 MG tablet Take 1 tablet by mouth Daily. 90 tablet 3    FLUoxetine (PROzac) 40 MG capsule Take 1 capsule by mouth Daily. 90 capsule 0    losartan-hydrochlorothiazide (HYZAAR) 100-12.5 MG per tablet       rosuvastatin (CRESTOR) 10 MG tablet Take 1 tablet by mouth Every Night.  0     No current facility-administered medications for this visit.       Review of Symptoms:    Review of Systems   Constitutional:  Positive for activity change (improved) and fatigue.   HENT: Negative.     Respiratory: Negative.     Cardiovascular: Negative.    Gastrointestinal: Negative.    Neurological: Negative.     Psychiatric/Behavioral:  Positive for dysphoric mood. Negative for agitation, behavioral problems, sleep disturbance and stress. The patient is not nervous/anxious.        Physical Exam:   not currently breastfeeding.  Video visit    Appearance:  female stated age in no acute distress  Gait, Station, Strength: Video visit    Mental Status Exam:     Hygiene:   good  Cooperation:  Cooperative  Eye Contact:  Good  Psychomotor Behavior:  Appropriate  Affect:  Full range  Mood:  minor dysphoria   Hopelessness: Denies  Speech:  Normal  Thought Process:  Goal directed and Linear  Thought Content:  Normal and Mood congruent  Suicidal:  None  Homicidal:  None  Hallucinations:  None  Delusion:  None  Memory:  Intact  Orientation:  Person, Place, Time and Situation  Reliability:  good  Insight:  Good  Judgement:  Good  Impulse Control:  Good  Physical/Medical Issues:  No        Lab Results:   No visits with results within 1 Month(s) from this visit.   Latest known visit with results is:   Office Visit on 2022   Component Date Value Ref Range Status    Reference Lab Report 2022    Final                    Value:[facility]  [address] [place] [address]  Phone: [phone] or [phone]  Fax: [phone]  Kerwin Arzola M.D., Medical Director    PATIENT NAME                                     LABORATORY NO.  [id number]   JEFF WALTON                                 [id number]  [id number]                                 AGE                    SEX   SSN              CLIENT REF #  [facility]                        67        1955      F     [id number]      [id number]    [address] [facility] [address]          REQUESTING M.D.           ATTENDING M.D.         [place] [address]                         DATE COLLECTED            DATE RECEIVED          DATE REPORTED  2022                 11/02/2022 11/04/2022    ThinPrep Pap with [facility]    DIAGNOSIS:  Negative for intraepithelial lesion or malignancy    Multiple factors can influence accuracy of Pap                           tests; therefore, screening at  regular intervals is necessary for early cancer detection.      SPECIMEN ADEQUACY:  SATISFACTORY FOR EVALUATION  SOURCE OF SPECIMEN:       VAGINAL  SLIDES:  1  CLINICAL HISTORY:  Smear, vaginal, as part of routine gynecological examination      CYTOTECHNOLOGIST:             HILARIA ARROYO (ASCP)    CPT CODES:  83394         Assessment & Plan   Diagnoses and all orders for this visit:    1. Generalized anxiety disorder  -     buPROPion XL (WELLBUTRIN XL) 300 MG 24 hr tablet; Take 1 tablet by mouth Daily.  Dispense: 90 tablet; Refill: 0  -     FLUoxetine (PROzac) 40 MG capsule; Take 1 capsule by mouth Daily.  Dispense: 90 capsule; Refill: 0    2. Mild episode of recurrent major depressive disorder  -     buPROPion XL (WELLBUTRIN XL) 300 MG 24 hr tablet; Take 1 tablet by mouth Daily.  Dispense: 90 tablet; Refill: 0  -     FLUoxetine (PROzac) 40 MG capsule; Take 1 capsule by mouth Daily.  Dispense: 90 capsule; Refill: 0    3. Class 1 obesity due to excess calories without serious comorbidity with body mass index (BMI) of 32.0 to 32.9 in adult  -     buPROPion XL (WELLBUTRIN XL) 300 MG 24 hr tablet; Take 1 tablet by mouth Daily.  Dispense: 90 tablet; Refill: 0    -Patient having some minor dysphoria as she has not been reliably on her medications but is restarting medications as we did previously with Prozac first to avoid the irritability and anxiety that Wellbutrin can cause alone and will add this back as she has been on the Prozac for at least 1 week.  -Reviewed previous available documentation  -Reviewed most recent available labs   -Continue Prozac 40 mg p.o. daily for mood and anxiety  -Continue Wellbutrin  mg p.o. daily for mood and anxiety as well as weight  -Obesity  complicates all aspects of care  -Encouraged her continued healthy eating practices and physical activity as she has had some successful weight loss  -Encouraged to consider therapy.  -Approximate appointment time 9:30 AM to 9:50 AM via video visit      Visit Diagnoses:    ICD-10-CM ICD-9-CM   1. Generalized anxiety disorder  F41.1 300.02   2. Mild episode of recurrent major depressive disorder  F33.0 296.31   3. Class 1 obesity due to excess calories without serious comorbidity with body mass index (BMI) of 32.0 to 32.9 in adult  E66.09 278.00    Z68.32 V85.32       TREATMENT PLAN/GOALS: Continue supportive psychotherapy efforts and medications as indicated. Treatment and medication options discussed during today's visit. Patient acknowledged and verbally consented to continue with current treatment plan and was educated on the importance of compliance with treatment and follow-up appointments.    MEDICATION ISSUES:    Discussed medication options and treatment plan of prescribed medication as well as the risks, benefits, and side effects including potential falls, possible impaired driving and metabolic adversities among others. Patient is agreeable to call the office with any worsening of symptoms or onset of side effects. Patient is agreeable to call 911 or go to the nearest ER should he/she begin having SI/HI.     MEDS ORDERED DURING VISIT:  New Medications Ordered This Visit   Medications    buPROPion XL (WELLBUTRIN XL) 300 MG 24 hr tablet     Sig: Take 1 tablet by mouth Daily.     Dispense:  90 tablet     Refill:  0    FLUoxetine (PROzac) 40 MG capsule     Sig: Take 1 capsule by mouth Daily.     Dispense:  90 capsule     Refill:  0       Return in about 6 weeks (around 10/10/2023).             This document has been electronically signed by Sim Knight MD  August 29, 2023 09:32 EDT

## 2023-09-05 ENCOUNTER — OFFICE VISIT (OUTPATIENT)
Dept: ORTHOPEDIC SURGERY | Facility: CLINIC | Age: 68
End: 2023-09-05
Payer: MEDICARE

## 2023-09-05 VITALS
BODY MASS INDEX: 28.86 KG/M2 | SYSTOLIC BLOOD PRESSURE: 122 MMHG | DIASTOLIC BLOOD PRESSURE: 72 MMHG | WEIGHT: 173.2 LBS | HEIGHT: 65 IN

## 2023-09-05 DIAGNOSIS — M75.41 ROTATOR CUFF IMPINGEMENT SYNDROME, RIGHT: ICD-10-CM

## 2023-09-05 DIAGNOSIS — M75.121 NONTRAUMATIC COMPLETE TEAR OF RIGHT ROTATOR CUFF: Primary | ICD-10-CM

## 2023-09-05 DIAGNOSIS — M75.41 IMPINGEMENT SYNDROME OF RIGHT SHOULDER: ICD-10-CM

## 2023-09-05 RX ORDER — LIDOCAINE HYDROCHLORIDE 10 MG/ML
5 INJECTION, SOLUTION EPIDURAL; INFILTRATION; INTRACAUDAL; PERINEURAL
Status: COMPLETED | OUTPATIENT
Start: 2023-09-05 | End: 2023-09-05

## 2023-09-05 RX ORDER — TRIAMCINOLONE ACETONIDE 40 MG/ML
40 INJECTION, SUSPENSION INTRA-ARTICULAR; INTRAMUSCULAR
Status: COMPLETED | OUTPATIENT
Start: 2023-09-05 | End: 2023-09-05

## 2023-09-05 RX ADMIN — TRIAMCINOLONE ACETONIDE 40 MG: 40 INJECTION, SUSPENSION INTRA-ARTICULAR; INTRAMUSCULAR at 09:40

## 2023-09-05 RX ADMIN — LIDOCAINE HYDROCHLORIDE 5 ML: 10 INJECTION, SOLUTION EPIDURAL; INFILTRATION; INTRACAUDAL; PERINEURAL at 09:40

## 2023-09-05 NOTE — PROGRESS NOTES
[facility] Orthopaedic Surgery Office Follow Up       Office Follow Up Visit       Patient Name: Germaine Jett    Chief Complaint:   Chief Complaint   Patient presents with    Follow-up     3.5 month F/U: Nontraumatic complete tear of right rotator cuff       Referring Physician: No ref. provider found    History of Present Illness:   It has been 3.5 month(s) since Germaine Jett's last visit. Germaine Jett returns to clinic today for F/U: follow-up of rightBody Part: shoulderReason: pain. The issue has been ongoing for 3 year(s). Germaine Jett rates HIS/HER: herpain at 3/10 on the pain scale. Previous/current treatments: NSAIDS and weight loss. Current symptoms:Symptoms: pain and stiffness. The pain is worse with sleeping, working, and lying on affected side; resting, heat, and pain medication and/or NSAID improves the pain. Overall, he/she: sheis doing better.  I have reviewed the patient's history of present illness as noted/entered above.    I have reviewed the patient's past medical history, surgical history, social history, family history, medications, and allergies as noted in the electronic medical record and as noted/entered.  I have reviewed the patient's review of systems as noted/enter and updated as noted in the patient's HPI.    RIGHT SHOULDER  2 years of pain off/on  Full-thickness tearing noted on recent MRI.  Daughter- YAMILKA Delgadillo  She loves taking care of her 6-year-old granddaughter and 3-year-old grandson  She remains very active  Counseled on operative versus nonoperative measures and desires to hold on operative measures at this time which is reasonable.  She understands tear progression can occur over time but she has been dealing with this off and on for more than 2 years     Shots have helped -- last was 12/21/2022  Alonso     Friends with Croa Jara        68 y.o. female  Body mass index is 30.29  kg/m².      MRI Shoulder Right Without Contrast     Result Date: 4/21/2023  Impression: Rotator cuff tendinopathy with focal full-thickness tear of the distal posterior supraspinatus tendon. Additionally there is low-grade interstitial partial tear of the superior aspect of the mid infraspinatus tendon. No focal muscular atrophy. Intra-articular biceps tendinopathy. Mild subcoracoid bursitis. Mild degenerative change of the glenohumeral and acromioclavicular joints. Electronically Signed: Sanchez MataRicardo  4/21/2023 3:55 PM EDT  Workstation ID: [id number]        I reviewed the MRI above and right shoulder 3 views 4/12/2023 within the system.  She has full-thickness fairly medialized tearing of the supraspinatus with a lateral stump of tissue remaining on the greater tuberosity.  She does have positive tangent sign indicative of supraspinatus fatty infiltration/atrophy of the muscle      9/5/2023:  The prior injection right subacromial on 5/23/2023 took away all of her pain and lasted until 9/3/2023 she had an exacerbation lifting overhead    Susi asked pursuing her doctorate    Cora-with her today.    Overall she is doing fantastic with exacerbation led to increased pain.  Overall she strongly desires to avoid surgery and similar findings for , Simone, and he has had a successful nonoperative course as well    Subjective   Subjective      Review of Systems   Constitutional: Negative.  Negative for chills, fatigue and fever.   HENT: Negative.  Negative for congestion and dental problem.    Eyes: Negative.  Negative for blurred vision.   Respiratory: Negative.  Negative for shortness of breath.    Cardiovascular: Negative.  Negative for leg swelling.   Gastrointestinal: Negative.  Negative for abdominal pain.   Endocrine: Negative.  Negative for polyuria.   Genitourinary: Negative.  Negative for difficulty urinating.   Musculoskeletal:  Positive for arthralgias.   Skin: Negative.    Allergic/Immunologic:  Negative.    Neurological: Negative.    Hematological: Negative.  Negative for adenopathy.   Psychiatric/Behavioral: Negative.  Negative for behavioral problems.       Past Medical History:   Past Medical History:   Diagnosis Date    Acute pain of left knee     Depression     Hx of    Dyspnea     Edema     Frozen shoulder     Hypercholesterolemia     Hyperlipidemia     Hypertension     Hypothyroid      10/14 TSH 5.320;  04/15 TSH 4.580    Insomnia     Knee osteoarthritis     RT. meniscal scope, Dr. Alvino Cifuentes, 01/15. removal of ganglion cyst 01/15    Obesity (BMI 30.0-34.9)     BMI 33.66    Palpitations     Periarthritis of shoulder     Wears glasses     READING       Past Surgical History:   Past Surgical History:   Procedure Laterality Date    APPENDECTOMY      BLADDER SURGERY      BREAST BIOPSY Left     COLONOSCOPY  2017    GANGLION CYST EXCISION  2015    R KNEE    HYSTERECTOMY      INCISIONAL BREAST BIOPSY      JOINT REPLACEMENT      KNEE ARTHROSCOPY Right     OOPHORECTOMY      TONSILLECTOMY AND ADENOIDECTOMY      TOTAL ABDOMINAL HYSTERECTOMY WITH SALPINGO OOPHORECTOMY      TOTAL KNEE ARTHROPLASTY Right 2019    Procedure: TOTAL KNEE ARTHROPLASTY RIGHT;  Surgeon: Medardo Cosme MD;  Location: [facility];  Service: Orthopedics       Family History:   Family History   Problem Relation Age of Onset    Heart attack Mother         Acute MI    Hypertension Mother     Stroke Mother     Depression Mother     Heart disease Mother     Osteoarthritis Mother     Osteoporosis Mother     Heart attack Father 63            Heart disease Father     Hypertension Father     Anxiety disorder Daughter     Depression Daughter     Depression Other     Heart disease Other     Hypertension Other     Breast cancer Neg Hx     Ovarian cancer Neg Hx        Social History:   Social History     Socioeconomic History    Marital status:     Number of children: 1    Highest education level: Master's degree (e.g.,  "MA, MS, Misty, MEd, MSW, SIMON)   Tobacco Use    Smoking status: Never    Smokeless tobacco: Never   Vaping Use    Vaping Use: Never used   Substance and Sexual Activity    Alcohol use: No    Drug use: No    Sexual activity: Yes     Partners: Male     Birth control/protection: Post-menopausal, Hysterectomy       Medications:   Current Outpatient Medications:     aspirin 81 MG EC tablet, Take 1 tablet by mouth Daily. Resume in 1 month, Disp: , Rfl:     buPROPion XL (WELLBUTRIN XL) 300 MG 24 hr tablet, Take 1 tablet by mouth Daily., Disp: 90 tablet, Rfl: 0    estradiol (ESTRACE) 1 MG tablet, Take 1 tablet by mouth Daily., Disp: 90 tablet, Rfl: 3    FLUoxetine (PROzac) 40 MG capsule, Take 1 capsule by mouth Daily., Disp: 90 capsule, Rfl: 0    losartan-hydrochlorothiazide (HYZAAR) 100-12.5 MG per tablet, , Disp: , Rfl:     rosuvastatin (CRESTOR) 10 MG tablet, Take 1 tablet by mouth Every Night., Disp: , Rfl: 0    Allergies:   Allergies   Allergen Reactions    Benadryl [Diphenhydramine] Other (See Comments)     SLEEPY    Adhesive Tape Rash    Percocet [Oxycodone-Acetaminophen] Other (See Comments)     SEVERE ITCHING IN HEAD       The following portions of the patient's history were reviewed and updated as appropriate: allergies, current medications, past family history, past medical history, past social history, past surgical history and problem list.        Objective    Objective      Vital Signs:   Vitals:    09/05/23 0925   BP: 122/72   Weight: 78.6 kg (173 lb 3.2 oz)   Height: 165.1 cm (65\")       Ortho Exam:  Right shoulder overall improved but recent pain exacerbation    Results Review:  Imaging Results (Last 24 Hours)       ** No results found for the last 24 hours. **            Procedures    RIGHT SHOULDER SUBACROMIAL SPACE INJECTION: Risks and benefits of a shoulder subacromial space injection were discussed and the patient desired to proceed. Verbal consent was obtained. The patient understood the risk of " infection, potential skin changes, bump in blood glucose especially with diabetes, nerve injury, possibility of increased pain in the short term, and possible incomplete pain relief.  Using sterile technique, the shoulder subacromial space was injected from a posterior approach with 1mL of 40 mg triamcinolone acetonide 40 MG/ML and 5cc of lidocaine with aspiration prior to injection. The patient tolerated the procedure without difficulty.  CPT CODE 97955 for major joint aspiration/injection          Assessment / Plan      Assessment/Plan:   Problem List Items Addressed This Visit          Musculoskeletal and Injuries    Nontraumatic complete tear of right rotator cuff - Primary    Rotator cuff impingement syndrome, right    Impingement syndrome of right shoulder       Right subacromial injection 9/5/2023.  Patient aware of full-thickness rotator cuff tearing    Follow Up: 3 to 4 months to reassess      Gilbert Castanon MD, FAAOS  Orthopedic Surgeon  Fellowship Trained Shoulder and Elbow Surgeon  [facility]  Orthopedics and Sports Medicine  [address] [place] [address], [address]  [place] [address]    09/05/23  09:53 EDT

## 2023-09-05 NOTE — PROGRESS NOTES
Procedure   - Large Joint Arthrocentesis: R subacromial bursa on 9/5/2023 9:40 AM  Indications: pain  Details: 21 G needle, posterior approach  Medications: 5 mL lidocaine PF 1% 1 %; 40 mg triamcinolone acetonide 40 MG/ML  Outcome: tolerated well, no immediate complications  Procedure, treatment alternatives, risks and benefits explained, specific risks discussed. Consent was given by the patient. Immediately prior to procedure a time out was called to verify the correct patient, procedure, equipment, support staff and site/side marked as required. Patient was prepped and draped in the usual sterile fashion.

## 2023-10-04 ENCOUNTER — OFFICE VISIT (OUTPATIENT)
Dept: ORTHOPEDIC SURGERY | Facility: CLINIC | Age: 68
End: 2023-10-04
Payer: MEDICARE

## 2023-10-04 VITALS
SYSTOLIC BLOOD PRESSURE: 136 MMHG | WEIGHT: 170.4 LBS | DIASTOLIC BLOOD PRESSURE: 78 MMHG | BODY MASS INDEX: 28.39 KG/M2 | HEIGHT: 65 IN

## 2023-10-04 DIAGNOSIS — S80.01XA CONTUSION OF RIGHT KNEE, INITIAL ENCOUNTER: ICD-10-CM

## 2023-10-04 DIAGNOSIS — Z96.651 S/P TKR (TOTAL KNEE REPLACEMENT), RIGHT: Primary | ICD-10-CM

## 2023-10-04 NOTE — PROGRESS NOTES
[facility] Note    Subjective     Chief Complaint   Patient presents with    Follow-up     3 year follow up -- 4 years status post right total knee arthroplasty 9/26/2019    Fall 9/28/23        HPI    It has been 3  year(s) since Ms. Jett's last visit. She returns to clinic today for follow-up of right knee pain. The issue has been ongoing for 6 day(s). She rates her pain a 0-5/10 on the pain scale. Previous/current treatments: NSAIDS. Current symptoms: pain, swelling, and numbness/tingling . The pain is worse with climbing stairs; ice improve the pain. Overall, she is doing the same.  She fell on the knee and had a bruise show up anteriorly, and wanted to have her knee checked out due to her underlying total knee arthroplasty.  She has been able to weight-bear without difficulty.  No pain today.  Pain has improved since the time of the injury.    I have reviewed the following portions of the patient's history and agree with: History of Present Illness and Review of Systems    Patient Active Problem List   Diagnosis    Palpitations    Hypertension    Hyperlipidemia    Obesity (BMI 30.0-34.9)    Hypercholesterolemia    Depression    Knee osteoarthritis    Hypothyroid    Insomnia    Primary osteoarthritis of right knee    Status post total right knee replacement    Leukocytosis, mild, likely reactive    Acute blood loss anemia, mild, asymptomatic    Acute postoperative pain    Nontraumatic complete tear of right rotator cuff    Rotator cuff impingement syndrome, right    Impingement syndrome of right shoulder     Past Medical History:   Diagnosis Date    Acute pain of left knee     Depression     Hx of    Dyspnea     Edema     Frozen shoulder 2021    Hypercholesterolemia     Hyperlipidemia     Hypertension     Hypothyroid      10/14 TSH 5.320;  04/15 TSH 4.580    Insomnia     Knee osteoarthritis     RT. meniscal scope, Dr. Alvino Cifuentes, 01/15. removal of ganglion cyst 01/15    Obesity (BMI  30.0-34.9)     BMI 33.66    Palpitations     Periarthritis of shoulder     Rotator cuff syndrome     Wears glasses     READING      Past Surgical History:   Procedure Laterality Date    APPENDECTOMY      BLADDER SURGERY      BREAST BIOPSY Left     COLONOSCOPY  2017    GANGLION CYST EXCISION  2015    R KNEE    HYSTERECTOMY      INCISIONAL BREAST BIOPSY      JOINT REPLACEMENT      KNEE ARTHROSCOPY Right     OOPHORECTOMY      TONSILLECTOMY AND ADENOIDECTOMY      TOTAL ABDOMINAL HYSTERECTOMY WITH SALPINGO OOPHORECTOMY      TOTAL KNEE ARTHROPLASTY Right 2019    Procedure: TOTAL KNEE ARTHROPLASTY RIGHT;  Surgeon: Medardo Cosme MD;  Location: [facility];  Service: Orthopedics      Family History   Problem Relation Age of Onset    Heart attack Mother         Acute MI    Hypertension Mother     Stroke Mother     Depression Mother     Heart disease Mother     Osteoarthritis Mother     Osteoporosis Mother     Heart attack Father 63            Heart disease Father     Hypertension Father     Anxiety disorder Daughter     Depression Daughter     Depression Other     Heart disease Other     Hypertension Other     Breast cancer Neg Hx     Ovarian cancer Neg Hx      Social History     Socioeconomic History    Marital status:     Number of children: 1    Highest education level: Master's degree (e.g., MA, MS, Misty, MEd, MSW, SIMON)   Tobacco Use    Smoking status: Never    Smokeless tobacco: Never   Vaping Use    Vaping Use: Never used   Substance and Sexual Activity    Alcohol use: No    Drug use: No    Sexual activity: Yes     Partners: Male     Birth control/protection: Post-menopausal, Hysterectomy      Current Outpatient Medications on File Prior to Visit   Medication Sig Dispense Refill    aspirin 81 MG EC tablet Take 1 tablet by mouth Daily. Resume in 1 month      buPROPion XL (WELLBUTRIN XL) 300 MG 24 hr tablet Take 1 tablet by mouth Daily. 90 tablet 0    estradiol (ESTRACE) 1 MG tablet Take 1  tablet by mouth Daily. 90 tablet 3    FLUoxetine (PROzac) 40 MG capsule Take 1 capsule by mouth Daily. 90 capsule 0    losartan-hydrochlorothiazide (HYZAAR) 100-12.5 MG per tablet       rosuvastatin (CRESTOR) 10 MG tablet Take 1 tablet by mouth Every Night.  0     No current facility-administered medications on file prior to visit.      Allergies   Allergen Reactions    Benadryl [Diphenhydramine] Other (See Comments)     SLEEPY    Adhesive Tape Rash    Percocet [Oxycodone-Acetaminophen] Other (See Comments)     SEVERE ITCHING IN HEAD        Review of Systems   Constitutional:  Negative for activity change, appetite change, chills, diaphoresis, fatigue, fever and unexpected weight change.   HENT:  Negative for congestion, dental problem, drooling, ear discharge, ear pain, facial swelling, hearing loss, mouth sores, nosebleeds, postnasal drip, rhinorrhea, sinus pressure, sneezing, sore throat, tinnitus, trouble swallowing and voice change.    Eyes:  Negative for photophobia, pain, discharge, redness, itching and visual disturbance.   Respiratory:  Negative for apnea, cough, choking, chest tightness, shortness of breath, wheezing and stridor.    Cardiovascular:  Negative for chest pain, palpitations and leg swelling.   Gastrointestinal:  Negative for abdominal distention, abdominal pain, anal bleeding, blood in stool, constipation, diarrhea, nausea, rectal pain and vomiting.   Endocrine: Negative for cold intolerance, heat intolerance, polydipsia, polyphagia and polyuria.   Genitourinary:  Negative for decreased urine volume, difficulty urinating, dysuria, enuresis, flank pain, frequency, genital sores, hematuria and urgency.   Musculoskeletal:  Positive for arthralgias. Negative for back pain, gait problem, joint swelling, myalgias, neck pain and neck stiffness.   Skin:  Positive for color change. Negative for pallor, rash and wound.   Allergic/Immunologic: Negative for environmental allergies, food allergies and  "immunocompromised state.   Neurological:  Negative for dizziness, tremors, seizures, syncope, facial asymmetry, speech difficulty, weakness, light-headedness, numbness and headaches.   Hematological:  Negative for adenopathy. Does not bruise/bleed easily.   Psychiatric/Behavioral:  Negative for agitation, behavioral problems, confusion, decreased concentration, dysphoric mood, hallucinations, self-injury, sleep disturbance and suicidal ideas. The patient is not nervous/anxious and is not hyperactive.       Objective      Physical Exam  /78   Ht 165.1 cm (65\")   Wt 77.3 kg (170 lb 6.4 oz)   BMI 28.36 kg/m²     Body mass index is 28.36 kg/m².    General:   Mental Status:  Alert   Appearance: Cooperative, in no acute distress   Build and Nutrition: Well-nourished well-developed female   Orientation: Alert and oriented to person, place and time   Posture: Normal   Gait: Normal/nonantalgic    Integument:   Right knee: Wound is well-healed with no signs of infection, ecchymosis anteriorly    Lower Extremities:   Right Knee:    Tenderness:  None    Effusion:  None    Swelling: None    Crepitus:  None    Range of motion:  Extension: 0°       Flexion: 130°  Instability:  No varus laxity, no valgus laxity, negative anterior drawer  Deformities:  None      Imaging/Studies  Imaging Results (Last 24 Hours)       Procedure Component Value Units Date/Time    XR Knee 3+ View With Gardners Right [[id number]] Resulted: 10/04/23 1111     Updated: 10/04/23 1112    Narrative:      Right Knee Radiographs  Indication: status-post right total knee arthroplasty  Views: AP, lateral, and sunrise views of the right knee    Comparison: no change compared to prior study, 10/19/2020    Findings:   The components are well aligned, with no signs of loosening or failure.                 Assessment and Plan     Diagnoses and all orders for this visit:    1. S/P TKR (total knee replacement), right (Primary)  -     XR Knee 3+ View With Gardners " Right    2. Contusion of right knee, initial encounter        1. S/P TKR (total knee replacement), right    2. Contusion of right knee, initial encounter        I reviewed my findings with the patient.  Her right total knee arthroplasty components are intact, and no underlying bony abnormalities are seen.  I will see her back if she has any worsening or problems with the knee.    Return if symptoms worsen or fail to improve.      Medardo Cosme MD  10/04/23  12:27 EDT

## 2023-10-23 ENCOUNTER — TRANSCRIBE ORDERS (OUTPATIENT)
Dept: ADMINISTRATIVE | Facility: HOSPITAL | Age: 68
End: 2023-10-23
Payer: MEDICARE

## 2023-10-23 DIAGNOSIS — Z12.31 SCREENING MAMMOGRAM FOR BREAST CANCER: Primary | ICD-10-CM

## 2023-12-19 ENCOUNTER — TELEMEDICINE (OUTPATIENT)
Dept: PSYCHIATRY | Facility: CLINIC | Age: 68
End: 2023-12-19
Payer: MEDICARE

## 2023-12-19 DIAGNOSIS — F33.0 MILD EPISODE OF RECURRENT MAJOR DEPRESSIVE DISORDER: ICD-10-CM

## 2023-12-19 DIAGNOSIS — E66.09 CLASS 1 OBESITY DUE TO EXCESS CALORIES WITHOUT SERIOUS COMORBIDITY WITH BODY MASS INDEX (BMI) OF 32.0 TO 32.9 IN ADULT: ICD-10-CM

## 2023-12-19 DIAGNOSIS — F41.1 GENERALIZED ANXIETY DISORDER: ICD-10-CM

## 2023-12-19 RX ORDER — BUPROPION HYDROCHLORIDE 300 MG/1
300 TABLET ORAL DAILY
Qty: 90 TABLET | Refills: 0 | Status: SHIPPED | OUTPATIENT
Start: 2023-12-19

## 2023-12-19 RX ORDER — FLUOXETINE HYDROCHLORIDE 40 MG/1
40 CAPSULE ORAL DAILY
Qty: 90 CAPSULE | Refills: 0 | Status: SHIPPED | OUTPATIENT
Start: 2023-12-19

## 2023-12-19 NOTE — PROGRESS NOTES
Subjective   Germaine Jett is a 68 y.o. female who presents today for follow up    Chief Complaint:  Depression and Anxiety    This provider is located at [facility], [address] [place] [address]. The Patient is seen remotely at home, using Epic Mychart. Patient is being seen via telehealth and stated they are in a secure environment for this session. The patient’s condition being diagnosed/treated is appropriate for telemedicine. The provider identified himself as well as his credentials.   The patient gave consent to be seen remotely, and when consent is given they understand that the consent allows for patient identifiable information to be sent to a third party as needed.   They may refuse to be seen remotely at any time. The electronic data is encrypted and password protected, and the patient has been advised of the potential risks to privacy not withstanding such measures      History of Present Illness: Patient presenting today for follow-up.  Since last visit, patient has been doing fairly well.  She has been better about taking her medication reliably but does admit that she has not started Wellbutrin back yet.  She has had COVID since Thanksgiving complicated by sinus infections so she is not feeling the best from a physical health standpoint but from a mental health standpoint feels that she is maintaining and doing well.  She is not having any medication side effects.  Sleep and appetite are stable.  She denies SI/HI/AVH.    The following portions of the patient's history were reviewed and updated as appropriate: allergies, current medications, past family history, past medical history, past social history, past surgical history and problem list.      Past Medical History:  Past Medical History:   Diagnosis Date    Acute pain of left knee     Depression     Hx of    Dyspnea     Edema     Frozen shoulder 2021    Hypercholesterolemia     Hyperlipidemia     Hypertension     Hypothyroid       10/14 TSH 5.320;  04/15 TSH 4.580    Insomnia     Knee osteoarthritis     RT. meniscal scope, Dr. Alvino Cifuentes, 01/15. removal of ganglion cyst 01/15    Obesity (BMI 30.0-34.9)     BMI 33.66    Palpitations     Periarthritis of shoulder     Rotator cuff syndrome     Wears glasses     READING       Social History:  Social History     Socioeconomic History    Marital status:     Number of children: 1    Highest education level: Master's degree (e.g., MA, MS, Misty, MEd, MSW, SIMON)   Tobacco Use    Smoking status: Never    Smokeless tobacco: Never   Vaping Use    Vaping Use: Never used   Substance and Sexual Activity    Alcohol use: No    Drug use: No    Sexual activity: Yes     Partners: Male     Birth control/protection: Post-menopausal, Hysterectomy       Family History:  Family History   Problem Relation Age of Onset    Heart attack Mother         Acute MI    Hypertension Mother     Stroke Mother     Depression Mother     Heart disease Mother     Osteoarthritis Mother     Osteoporosis Mother     Heart attack Father 63            Heart disease Father     Hypertension Father     Anxiety disorder Daughter     Depression Daughter     Depression Other     Heart disease Other     Hypertension Other     Breast cancer Neg Hx     Ovarian cancer Neg Hx        Past Surgical History:  Past Surgical History:   Procedure Laterality Date    APPENDECTOMY      BLADDER SURGERY      BREAST BIOPSY Left     COLONOSCOPY  2017    GANGLION CYST EXCISION  2015    R KNEE    HYSTERECTOMY      INCISIONAL BREAST BIOPSY      JOINT REPLACEMENT      KNEE ARTHROSCOPY Right     OOPHORECTOMY      TONSILLECTOMY AND ADENOIDECTOMY      TOTAL ABDOMINAL HYSTERECTOMY WITH SALPINGO OOPHORECTOMY      TOTAL KNEE ARTHROPLASTY Right 2019    Procedure: TOTAL KNEE ARTHROPLASTY RIGHT;  Surgeon: Medardo Cosme MD;  Location: [facility];  Service: Orthopedics       Problem List:  Patient Active Problem List   Diagnosis    Palpitations     Hypertension    Hyperlipidemia    Obesity (BMI 30.0-34.9)    Hypercholesterolemia    Depression    Knee osteoarthritis    Hypothyroid    Insomnia    Primary osteoarthritis of right knee    Status post total right knee replacement    Leukocytosis, mild, likely reactive    Acute blood loss anemia, mild, asymptomatic    Acute postoperative pain    Nontraumatic complete tear of right rotator cuff    Rotator cuff impingement syndrome, right    Impingement syndrome of right shoulder       Allergy:   Allergies   Allergen Reactions    Benadryl [Diphenhydramine] Other (See Comments)     SLEEPY    Adhesive Tape Rash    Percocet [Oxycodone-Acetaminophen] Other (See Comments)     SEVERE ITCHING IN HEAD        Current Medications:   Current Outpatient Medications   Medication Sig Dispense Refill    aspirin 81 MG EC tablet Take 1 tablet by mouth Daily. Resume in 1 month      buPROPion XL (WELLBUTRIN XL) 300 MG 24 hr tablet Take 1 tablet by mouth Daily. 90 tablet 0    estradiol (ESTRACE) 1 MG tablet Take 1 tablet by mouth Daily. 90 tablet 3    FLUoxetine (PROzac) 40 MG capsule Take 1 capsule by mouth Daily. 90 capsule 0    losartan-hydrochlorothiazide (HYZAAR) 100-12.5 MG per tablet       rosuvastatin (CRESTOR) 10 MG tablet Take 1 tablet by mouth Every Night.  0     No current facility-administered medications for this visit.       Review of Symptoms:    Review of Systems   Constitutional:  Positive for activity change (improved) and fatigue.   HENT:  Positive for congestion, rhinorrhea and sore throat.    Respiratory:  Positive for cough.    Cardiovascular: Negative.    Gastrointestinal: Negative.    Neurological:  Positive for headache.   Psychiatric/Behavioral:  Negative for agitation, behavioral problems, dysphoric mood, sleep disturbance and stress. The patient is not nervous/anxious.          Physical Exam:   not currently breastfeeding.  Video visit    Appearance:  female stated age in no acute distress  Gait,  Station, Strength: Video visit    Mental Status Exam:     Hygiene:   good  Cooperation:  Cooperative  Eye Contact:  Good  Psychomotor Behavior:  Appropriate  Affect:  Full range  Mood: normal  Hopelessness: Denies  Speech:  Normal  Thought Process:  Goal directed and Linear  Thought Content:  Normal and Mood congruent  Suicidal:  None  Homicidal:  None  Hallucinations:  None  Delusion:  None  Memory:  Intact  Orientation:  Person, Place, Time and Situation  Reliability:  good  Insight:  Good  Judgement:  Good  Impulse Control:  Good  Physical/Medical Issues:  No        Lab Results:   No visits with results within 1 Month(s) from this visit.   Latest known visit with results is:   Office Visit on 2022   Component Date Value Ref Range Status    Reference Lab Report 2022    Final                    Value:[facility]  [address] [place] [address]  Phone: [phone] or [phone]  Fax: [phone]  Kerwin Arzola M.D., Medical Director    PATIENT NAME                                     LABORATORY NO.  [id number]   JEFF WALTON                                 [id number]  [id number]                                 AGE                    SEX   SSN              CLIENT REF #  [facility]                        67        1955      F     [id number]      [id number]    [facility] [address]          REQUESTING M.D.           ATTENDING [place] [address]                         DATE COLLECTED            DATE RECEIVED          DATE REPORTED  2022    ThinPrep Pap with [facility]    DIAGNOSIS:  Negative for intraepithelial lesion or malignancy    Multiple factors can influence accuracy of Pap                           tests; therefore, screening at  regular intervals is necessary for early cancer  detection.      SPECIMEN ADEQUACY:  SATISFACTORY FOR EVALUATION  SOURCE OF SPECIMEN:       VAGINAL  SLIDES:  1  CLINICAL HISTORY:  Smear, vaginal, as part of routine gynecological examination      CYTOTECHNOLOGIST:             HILARIA ARROYO (ASCP)    CPT CODES:  17726         Assessment & Plan   Diagnoses and all orders for this visit:    1. Generalized anxiety disorder  -     FLUoxetine (PROzac) 40 MG capsule; Take 1 capsule by mouth Daily.  Dispense: 90 capsule; Refill: 0  -     buPROPion XL (WELLBUTRIN XL) 300 MG 24 hr tablet; Take 1 tablet by mouth Daily.  Dispense: 90 tablet; Refill: 0    2. Mild episode of recurrent major depressive disorder  -     FLUoxetine (PROzac) 40 MG capsule; Take 1 capsule by mouth Daily.  Dispense: 90 capsule; Refill: 0  -     buPROPion XL (WELLBUTRIN XL) 300 MG 24 hr tablet; Take 1 tablet by mouth Daily.  Dispense: 90 tablet; Refill: 0    3. Class 1 obesity due to excess calories without serious comorbidity with body mass index (BMI) of 32.0 to 32.9 in adult  -     buPROPion XL (WELLBUTRIN XL) 300 MG 24 hr tablet; Take 1 tablet by mouth Daily.  Dispense: 90 tablet; Refill: 0      -Patient overall fairly stable.  She has not started Wellbutrin back yet but intends to.  She is not having any major symptoms from mental health standpoint but is having some upper respiratory symptoms.  -Reviewed previous available documentation  -Reviewed most recent available labs   -Continue Prozac 40 mg p.o. daily for mood and anxiety  -Continue Wellbutrin  mg p.o. daily for mood and anxiety as well as weight  -Obesity complicates all aspects of care  -Encouraged her continued healthy eating practices and physical activity as she has had some successful weight loss  -Encouraged to consider therapy.  -Approximate appointment time 11 AM to 11:15 AM via video visit      Visit Diagnoses:    ICD-10-CM ICD-9-CM   1. Generalized anxiety disorder  F41.1 300.02   2. Mild episode of recurrent major depressive  disorder  F33.0 296.31   3. Class 1 obesity due to excess calories without serious comorbidity with body mass index (BMI) of 32.0 to 32.9 in adult  E66.09 278.00    Z68.32 V85.32         TREATMENT PLAN/GOALS: Continue supportive psychotherapy efforts and medications as indicated. Treatment and medication options discussed during today's visit. Patient acknowledged and verbally consented to continue with current treatment plan and was educated on the importance of compliance with treatment and follow-up appointments.    MEDICATION ISSUES:    Discussed medication options and treatment plan of prescribed medication as well as the risks, benefits, and side effects including potential falls, possible impaired driving and metabolic adversities among others. Patient is agreeable to call the office with any worsening of symptoms or onset of side effects. Patient is agreeable to call 911 or go to the nearest ER should he/she begin having SI/HI.     MEDS ORDERED DURING VISIT:  New Medications Ordered This Visit   Medications    FLUoxetine (PROzac) 40 MG capsule     Sig: Take 1 capsule by mouth Daily.     Dispense:  90 capsule     Refill:  0    buPROPion XL (WELLBUTRIN XL) 300 MG 24 hr tablet     Sig: Take 1 tablet by mouth Daily.     Dispense:  90 tablet     Refill:  0       Return in about 3 months (around 3/19/2024).             This document has been electronically signed by Sim Knight MD  December 19, 2023 11:08 EST

## 2024-01-26 ENCOUNTER — TELEPHONE (OUTPATIENT)
Dept: OBSTETRICS AND GYNECOLOGY | Facility: CLINIC | Age: 69
End: 2024-01-26

## 2024-01-26 ENCOUNTER — OFFICE VISIT (OUTPATIENT)
Dept: OBSTETRICS AND GYNECOLOGY | Facility: CLINIC | Age: 69
End: 2024-01-26
Payer: MEDICARE

## 2024-01-26 VITALS
WEIGHT: 185.4 LBS | DIASTOLIC BLOOD PRESSURE: 62 MMHG | BODY MASS INDEX: 30.89 KG/M2 | SYSTOLIC BLOOD PRESSURE: 120 MMHG | HEIGHT: 65 IN

## 2024-01-26 DIAGNOSIS — Z79.890 HORMONE REPLACEMENT THERAPY (POSTMENOPAUSAL): ICD-10-CM

## 2024-01-26 DIAGNOSIS — N39.46 MIXED INCONTINENCE: ICD-10-CM

## 2024-01-26 DIAGNOSIS — Z01.419 ROUTINE GYNECOLOGICAL EXAMINATION: Primary | ICD-10-CM

## 2024-01-26 DIAGNOSIS — Z12.11 ENCOUNTER FOR SCREENING COLONOSCOPY: Primary | ICD-10-CM

## 2024-01-26 DIAGNOSIS — N95.2 VAGINAL ATROPHY: ICD-10-CM

## 2024-01-26 RX ORDER — ESTRADIOL 0.1 MG/G
CREAM VAGINAL
Qty: 42.5 G | Refills: 4 | Status: SHIPPED | OUTPATIENT
Start: 2024-01-26

## 2024-01-26 RX ORDER — ESTRADIOL 0.5 MG/1
0.5 TABLET ORAL DAILY
Qty: 90 TABLET | Refills: 3 | Status: SHIPPED | OUTPATIENT
Start: 2024-01-26

## 2024-01-26 NOTE — PATIENT INSTRUCTIONS
Self breast exam monthly  Annual mammogram  Calcium 1200 mg daily and vit D 2000 mg daily  Regular excercise   Recommend starting vaginal estrogen cream and patient is OK reducing estradiol to 0.5 mg daily   Discussed findings of minimal Grade 1 cystocele. Recommend referral to urology to discuss possible Bulkamid and patient agreeable

## 2024-01-26 NOTE — TELEPHONE ENCOUNTER
Caller: Germaine Jett    Relationship to patient: Self    Best call back number: [phone]    Patient is needing: PT WAS SEEN IN OFFICE TODAY. PT IS ASKING FOR A REFERRAL SENT OVER TO DR. THAKKAR OFFICE FOR COLONOSCOPY.

## 2024-01-30 ENCOUNTER — TELEPHONE (OUTPATIENT)
Dept: SURGERY | Facility: CLINIC | Age: 69
End: 2024-01-30
Payer: MEDICARE

## 2024-01-30 NOTE — TELEPHONE ENCOUNTER
Pt would like to be scheduled at [facility] for 02/27/24-would like the miralax prep option-verified pharmacy. Thank you.

## 2024-01-30 NOTE — TELEPHONE ENCOUNTER
PRESCREENING FOR OPEN ACCESS SCHEDULING    Germaine Jtet, 1955  [id number]    01/30/24    If, the patient answers yes to any of the following questions the provider will be informed prior to scheduling open access for approval and documented in the chart.    [x]  Yes  [] No    1. Have you ever had a colonoscopy in the past?      When:  more than 10 yrs ago       Where: [facility]      Polyps or other:     []  Yes  [x] No    2. Family history of colon cancer?      Relation:       Age of onset:       Do you currently have any of the following?    []  Yes  [x] No  Rectal bleeding, if so, how long?     []  Yes  [x] No  Abdominal pain, if so, how long?    [x]  Yes  [] No  Constipation, if so, how long? About 5 yrs     []  Yes  [x] No  Diarrhea, if so, how long?    []  Yes  [x] No  Weight loss, is so, how much?    [] Yes  [x] No  Small caliber stool, if so, how long?      Have you ever had any of the following conditions?    [] Yes  [x] No  Heart attack?      When?       Last cardiac workup?     Blood thinners? 81 MG Aspirin    [] Yes  [x] No   Lung problems, asthma or COPD?  [] Yes  [x] No  Oxygen required?       [] Yes  [x] No  Stroke?     [] Yes  [x] No  Have you ever had a reaction to anesthesia?

## 2024-01-31 RX ORDER — BISACODYL 5 MG/1
TABLET, DELAYED RELEASE ORAL
Qty: 4 TABLET | Refills: 0 | Status: SHIPPED | OUTPATIENT
Start: 2024-01-31

## 2024-01-31 RX ORDER — POLYETHYLENE GLYCOL 3350 17 G/17G
238 POWDER, FOR SOLUTION ORAL ONCE
Qty: 238 G | Refills: 0 | Status: SHIPPED | OUTPATIENT
Start: 2024-01-31 | End: 2024-01-31

## 2024-02-01 ENCOUNTER — PREP FOR SURGERY (OUTPATIENT)
Dept: OTHER | Facility: HOSPITAL | Age: 69
End: 2024-02-01
Payer: MEDICARE

## 2024-02-01 DIAGNOSIS — Z12.11 SCREENING FOR COLON CANCER: Primary | ICD-10-CM

## 2024-02-19 ENCOUNTER — HOSPITAL ENCOUNTER (OUTPATIENT)
Dept: MAMMOGRAPHY | Facility: HOSPITAL | Age: 69
Discharge: HOME | End: 2024-02-19
Admitting: OBSTETRICS & GYNECOLOGY
Payer: MEDICARE

## 2024-02-19 DIAGNOSIS — Z12.31 SCREENING MAMMOGRAM FOR BREAST CANCER: ICD-10-CM

## 2024-02-19 PROCEDURE — 77067 SCR MAMMO BI INCL CAD: CPT

## 2024-02-19 PROCEDURE — 77063 BREAST TOMOSYNTHESIS BI: CPT

## 2024-02-22 ENCOUNTER — TELEPHONE (OUTPATIENT)
Dept: SURGERY | Facility: CLINIC | Age: 69
End: 2024-02-22
Payer: MEDICARE

## 2024-03-01 ENCOUNTER — OFFICE VISIT (OUTPATIENT)
Dept: UROLOGY | Facility: CLINIC | Age: 69
End: 2024-03-01
Payer: MEDICARE

## 2024-03-01 VITALS
DIASTOLIC BLOOD PRESSURE: 72 MMHG | BODY MASS INDEX: 30.82 KG/M2 | WEIGHT: 185 LBS | HEIGHT: 65 IN | SYSTOLIC BLOOD PRESSURE: 124 MMHG

## 2024-03-01 DIAGNOSIS — R32 URINARY INCONTINENCE, UNSPECIFIED TYPE: Primary | ICD-10-CM

## 2024-03-01 LAB
BILIRUB BLD-MCNC: NEGATIVE MG/DL
CLARITY, POC: ABNORMAL
COLOR UR: YELLOW
EXPIRATION DATE: ABNORMAL
GLUCOSE UR STRIP-MCNC: NEGATIVE MG/DL
KETONES UR QL: NEGATIVE
LEUKOCYTE EST, POC: ABNORMAL
Lab: ABNORMAL
NITRITE UR-MCNC: NEGATIVE MG/ML
PH UR: 5.5 [PH] (ref 5–8)
PROT UR STRIP-MCNC: ABNORMAL MG/DL
RBC # UR STRIP: ABNORMAL /UL
SP GR UR: 1.03 (ref 1–1.03)
UROBILINOGEN UR QL: NORMAL

## 2024-03-01 RX ORDER — VIBEGRON 75 MG/1
75 TABLET, FILM COATED ORAL DAILY
Qty: 30 TABLET | Refills: 11 | Status: SHIPPED | OUTPATIENT
Start: 2024-03-01

## 2024-03-01 RX ORDER — ERGOCALCIFEROL 1.25 MG/1
1 CAPSULE ORAL
COMMUNITY
Start: 2024-02-04

## 2024-03-01 NOTE — PROGRESS NOTES
[facility] Female Office Visit      Patient Name: Germaine Jett  : 1955   MRN: [id number]     Chief Complaint:  Lower Urinary Tract Symptoms.   Chief Complaint   Patient presents with    Urinary Incontinence     New patient       Referring Provider: Edwige Frausto PA-C    History of Present Illness: Mrs. Jett is a 68 y.o. female who presents with complaint of mixed urinary incontinence for the past 3 years.   Primarily complains of UUI.  She has tried kegels in the past but was unsure if she was doing it correctly.  She stops drinking fluids 3 hours before bed.      Has not previously seen urology.    Pt has primarily mixed urinary incontinence.     Severity: Pt uses up to 4 pads a day and has tried a medication in the past that she can't remember what it was.  Bladder suspension surgery with hysterectomy in .    Associated Sx: Pt has h/o daytime frequency, urgency, incontinence with coughing, sneezing, and laughing.       No h/o poor stream, straining, hesitancy or incomplete voiding, no nocturia     No h/o recurrent UTI, hematuria, nephrolithiasis    No vaginal discharge or bleeding  No sensation of POP  No        Fecal incontinence  No Post-void dribbling  No Dyspareunia   Occasional  Constipation  0 Vaginal deliveries    Subjective      Review of System: Review of Systems   Constitutional:  Negative for appetite change, chills, diaphoresis, fatigue and fever.   Gastrointestinal:  Positive for constipation (occasional). Negative for abdominal pain, diarrhea and vomiting.   Genitourinary:  Positive for frequency and urgency. Negative for difficulty urinating, dyspareunia, dysuria, flank pain and hematuria.      Past Medical History:  Past Medical History:   Diagnosis Date    Acute pain of left knee     Depression     Hx of    Dyspnea     Edema     Frozen shoulder     Hypercholesterolemia     Hyperlipidemia     Hypertension     Hypothyroid      10/14 TSH 5.320;  04/15 TSH 4.580    Insomnia      Knee osteoarthritis     RT. meniscal scope, Dr. Alvino Cifuentes, 01/15. removal of ganglion cyst 01/15    Obesity (BMI 30.0-34.9)     BMI 33.66    Palpitations     Periarthritis of shoulder     Rotator cuff syndrome     Urinary incontinence     Wears glasses     READING     Past Surgical History:  Past Surgical History:   Procedure Laterality Date    APPENDECTOMY      BLADDER SURGERY      BREAST BIOPSY Left      SECTION      COLONOSCOPY  2017    GANGLION CYST EXCISION  2015    R KNEE    HYSTERECTOMY      INCISIONAL BREAST BIOPSY      JOINT REPLACEMENT      KNEE ARTHROSCOPY Right     OOPHORECTOMY      TONSILLECTOMY AND ADENOIDECTOMY      TOTAL ABDOMINAL HYSTERECTOMY WITH SALPINGO OOPHORECTOMY      TOTAL KNEE ARTHROPLASTY Right 2019    Procedure: TOTAL KNEE ARTHROPLASTY RIGHT;  Surgeon: Medardo Cosme MD;  Location: [facility];  Service: Orthopedics     Medications:    Current Outpatient Medications:     aspirin 81 MG EC tablet, Take 1 tablet by mouth Daily. Resume in 1 month, Disp: , Rfl:     buPROPion XL (WELLBUTRIN XL) 300 MG 24 hr tablet, Take 1 tablet by mouth Daily., Disp: 90 tablet, Rfl: 0    estradiol (ESTRACE VAGINAL) 0.1 MG/GM vaginal cream, Insert 1 gm intravaginally 2 times each week, Disp: 42.5 g, Rfl: 4    estradiol (ESTRACE) 0.5 MG tablet, Take 1 tablet by mouth Daily., Disp: 90 tablet, Rfl: 3    FLUoxetine (PROzac) 40 MG capsule, Take 1 capsule by mouth Daily., Disp: 90 capsule, Rfl: 0    losartan-hydrochlorothiazide (HYZAAR) 100-12.5 MG per tablet, , Disp: , Rfl:     rosuvastatin (CRESTOR) 10 MG tablet, Take 1 tablet by mouth Every Night., Disp: , Rfl: 0    Vitamin D, Ergocalciferol, 52191 units capsule, Take 1 capsule by mouth Every 7 (Seven) Days., Disp: , Rfl:     Allergies:  Allergies   Allergen Reactions    Benadryl [Diphenhydramine] Other (See Comments)     SLEEPY    Adhesive Tape Rash    Percocet [Oxycodone-Acetaminophen] Other (See Comments)     SEVERE ITCHING IN HEAD     "Wound Dressing Adhesive Rash     Social History:  Social History     Socioeconomic History    Marital status:     Number of children: 1    Highest education level: Master's degree (e.g., MA, MS, Misty, MEd, MSW, SIMON)   Tobacco Use    Smoking status: Never    Smokeless tobacco: Never   Vaping Use    Vaping Use: Never used   Substance and Sexual Activity    Alcohol use: No    Drug use: No    Sexual activity: Yes     Partners: Male     Birth control/protection: Post-menopausal, Hysterectomy     Family History:  Family History   Problem Relation Age of Onset    Heart attack Mother         Acute MI    Hypertension Mother     Stroke Mother     Depression Mother     Heart disease Mother     Osteoarthritis Mother     Osteoporosis Mother     Heart attack Father 63            Heart disease Father     Hypertension Father     Anxiety disorder Daughter     Depression Daughter     Depression Other     Heart disease Other     Hypertension Other     Breast cancer Neg Hx     Ovarian cancer Neg Hx      Objective     Physical Exam:   Vital Signs:   Vitals:    24 1012   BP: 124/72   BP Location: Left arm   Patient Position: Sitting   Cuff Size: Adult   Weight: 83.9 kg (185 lb)   Height: 165.1 cm (65\")     Body mass index is 30.79 kg/m².     Physical Exam  Vitals and nursing note reviewed.   Constitutional:       Appearance: Normal appearance. She is not ill-appearing.   HENT:      Head: Normocephalic.   Pulmonary:      Effort: Pulmonary effort is normal.   Neurological:      General: No focal deficit present.      Mental Status: She is alert and oriented to person, place, and time.   Psychiatric:         Mood and Affect: Mood normal.         Behavior: Behavior normal.     Labs:   Brief Urine Lab Results  (Last result in the past 365 days)        Color   Clarity   Blood   Leuk Est   Nitrite   Protein   CREAT   Urine HCG        24 1018 Yellow   Slightly Cloudy   1+   Moderate (2+)   Negative   Trace             "     Lab Results   Component Value Date    GLUCOSE 149 (H) 09/27/2019    CALCIUM 8.9 09/27/2019     09/27/2019    K 3.8 09/27/2019    CO2 22.0 09/27/2019     09/27/2019    BUN 13 09/27/2019    CREATININE 0.96 09/27/2019    EGFRIFNONA 58 (L) 09/27/2019    BCR 13.5 09/27/2019    ANIONGAP 12.0 09/27/2019       Lab Results   Component Value Date    WBC 11.18 (H) 09/27/2019    HGB 10.4 (L) 09/27/2019    HCT 33.1 (L) 09/27/2019    MCV 98.5 (H) 09/27/2019     09/27/2019     Urine Incontinence: (NOUI)  Mixed      PVR 0 ml reviewed by me.     Assessment / Plan      Assessment  Ms. Jett is a 68 y.o. female with mixed urinary incontinence. She is most concerned with urge incontinence.  UA negative for nitrites    We discussed the AUA guidelines for urge urinary incontinence.  We discussed lifestyle changes such as weight reduction and caffeine reduction, as well as medications such as anticholinergics and beta agonist.  We discussed third line therapies, such as Botox and InterStim. The patient has elected to try Gemtesa.  She was provided with samples to try today. We discussed the risks, benefits, and alternatives to this approach.  She voiced her understanding and wished to proceed.    Plan  Increase water intake, decrease caffeine intake.  Drink 64 ounces of fluids per day.  Educated on Kegel exercises.  Patient verbalized understanding.    Bladder control strategies: stop fluids at least 2 hours before bed.  Avoid drinking large amounts of fluids prior to travel.  Instructed her to time voids and attempt to increase time between voiding.    Start Gemtesa, provided with 6 weeks of samples.    5. Follow up in 6 weeks for medication check and pelvic examination to evaluate for stress incontinence, PVR, and UA.     Elva Kee, APRN, MSN, FNP-C  [facility]

## 2024-03-04 ENCOUNTER — TELEPHONE (OUTPATIENT)
Dept: UROLOGY | Facility: CLINIC | Age: 69
End: 2024-03-04
Payer: MEDICARE

## 2024-03-04 NOTE — TELEPHONE ENCOUNTER
Called patient and discussed UA findings from her last visit.  This is a concentrated specimen; Nitrites were negative.  She was negative for UTI symptoms at visit.  Will repeat at her next follow up appointment.

## 2024-03-04 NOTE — TELEPHONE ENCOUNTER
Caller: Germaine Jett    Relationship: Self    Best call back number: [phone]        What test was performed: UA    When was the test performed: 3/1/24      Additional notes: PT REVIEWED UA TEST RESULTS FROM OFFICE VISIT ON 3/1/24 AND WOULD LIKE TO DISCUSS THE ABNORMAL FINDINGS.    PLEASE CALL PT TO DISCUSS.

## 2024-03-08 ENCOUNTER — OFFICE VISIT (OUTPATIENT)
Dept: ORTHOPEDIC SURGERY | Facility: CLINIC | Age: 69
End: 2024-03-08
Payer: MEDICARE

## 2024-03-08 VITALS
BODY MASS INDEX: 30.12 KG/M2 | SYSTOLIC BLOOD PRESSURE: 122 MMHG | WEIGHT: 180.8 LBS | HEIGHT: 65 IN | DIASTOLIC BLOOD PRESSURE: 76 MMHG

## 2024-03-08 DIAGNOSIS — M75.41 IMPINGEMENT SYNDROME OF RIGHT SHOULDER: ICD-10-CM

## 2024-03-08 DIAGNOSIS — M75.41 ROTATOR CUFF IMPINGEMENT SYNDROME, RIGHT: ICD-10-CM

## 2024-03-08 DIAGNOSIS — M75.121 NONTRAUMATIC COMPLETE TEAR OF RIGHT ROTATOR CUFF: Primary | ICD-10-CM

## 2024-03-08 RX ORDER — TRIAMCINOLONE ACETONIDE 40 MG/ML
40 INJECTION, SUSPENSION INTRA-ARTICULAR; INTRAMUSCULAR
Status: COMPLETED | OUTPATIENT
Start: 2024-03-08 | End: 2024-03-08

## 2024-03-08 RX ORDER — LIDOCAINE HYDROCHLORIDE 10 MG/ML
5 INJECTION, SOLUTION EPIDURAL; INFILTRATION; INTRACAUDAL; PERINEURAL
Status: COMPLETED | OUTPATIENT
Start: 2024-03-08 | End: 2024-03-08

## 2024-03-08 RX ADMIN — TRIAMCINOLONE ACETONIDE 40 MG: 40 INJECTION, SUSPENSION INTRA-ARTICULAR; INTRAMUSCULAR at 09:29

## 2024-03-08 RX ADMIN — LIDOCAINE HYDROCHLORIDE 5 ML: 10 INJECTION, SOLUTION EPIDURAL; INFILTRATION; INTRACAUDAL; PERINEURAL at 09:29

## 2024-03-08 NOTE — PROGRESS NOTES
Procedure   - Large Joint Arthrocentesis: R subacromial bursa on 3/8/2024 10:12 AM  Indications: pain  Details: 21 G needle, posterior approach  Medications: 5 mL lidocaine PF 1% 1 %; 40 mg triamcinolone acetonide 40 MG/ML  Outcome: tolerated well, no immediate complications  Procedure, treatment alternatives, risks and benefits explained, specific risks discussed. Consent was given by the patient. Immediately prior to procedure a time out was called to verify the correct patient, procedure, equipment, support staff and site/side marked as required. Patient was prepped and draped in the usual sterile fashion.

## 2024-03-08 NOTE — PROGRESS NOTES
[facility] Follow Up       Office Follow Up Visit       Patient Name: Germaine Jett    Chief Complaint:   Chief Complaint   Patient presents with    Follow-up     6 month follow up -- Nontraumatic complete tear of right rotator cuff       Referring Physician: No ref. provider found    History of Present Illness:   It has been 6  month(s) since Germaine Jett's last visit. Germaine Jett returns to clinic today for F/U: follow-up of rightBody Part: shoulderReason: pain. The issue has been ongoing for 4 year(s). Germaine Jett rates HIS/HER: herpain at 7/10 on the pain scale. Previous/current treatments: NSAIDS. Current symptoms:Symptoms: pain. The pain is worse with sleeping, working, lying on affected side, and any movement of the joint; pain medication and/or NSAID improves the pain. Overall, he/she: sheis doing the same. I have reviewed the patient's history of present illness as noted/entered above.    I have reviewed the patient's past medical history, surgical history, social history, family history, medications, and allergies as noted in the electronic medical record and as noted/entered.  I have reviewed the patient's review of systems as noted/enter and updated as noted in the patient's HPI.      RIGHT SHOULDER  2 years of pain off/on  Full-thickness tearing noted on recent MRI.  Daughter- YAMILKA Delgadillo  She loves taking care of her 6-year-old granddaughter and 3-year-old grandson  She remains very active  Counseled on operative versus nonoperative measures and desires to hold on operative measures at this time which is reasonable.  She understands tear progression can occur over time but she has been dealing with this off and on for more than 2 years     Shots have helped -- last was 12/21/2022  Alonso     Friends with Cora Jara        68 y.o. female  Body mass index is 30.29 kg/m².        MRI  Shoulder Right Without Contrast     Result Date: 4/21/2023  Impression: Rotator cuff tendinopathy with focal full-thickness tear of the distal posterior supraspinatus tendon. Additionally there is low-grade interstitial partial tear of the superior aspect of the mid infraspinatus tendon. No focal muscular atrophy. Intra-articular biceps tendinopathy. Mild subcoracoid bursitis. Mild degenerative change of the glenohumeral and acromioclavicular joints. Electronically Signed: Sanchez Ricardo  4/21/2023 3:55 PM EDT  Workstation ID: [id number]        I reviewed the MRI above and right shoulder 3 views 4/12/2023 within the system.  She has full-thickness fairly medialized tearing of the supraspinatus with a lateral stump of tissue remaining on the greater tuberosity.  She does have positive tangent sign indicative of supraspinatus fatty infiltration/atrophy of the muscle        9/5/2023:  The prior injection right subacromial on 5/23/2023 took away all of her pain and lasted until 9/3/2023 she had an exacerbation lifting overhead     Susi asked pursuing her doctorate     Cora-with her today.     Overall she is doing fantastic with exacerbation led to increased pain.  Overall she strongly desires to avoid surgery and similar findings for , Simone, and he has had a successful nonoperative course as well      3/8/2024:  RIGHT SA injection 3/8/2024  She and Cora came up together again today      Subjective   Subjective      Review of Systems   Constitutional: Negative.  Negative for chills, fatigue and fever.   HENT: Negative.  Negative for congestion and dental problem.    Eyes: Negative.  Negative for blurred vision.   Respiratory: Negative.  Negative for shortness of breath.    Cardiovascular: Negative.  Negative for leg swelling.   Gastrointestinal: Negative.  Negative for abdominal pain.   Endocrine: Negative.  Negative for polyuria.   Genitourinary: Negative.  Negative for difficulty urinating.    Musculoskeletal:  Positive for arthralgias.   Skin: Negative.    Allergic/Immunologic: Negative.    Neurological: Negative.    Hematological: Negative.  Negative for adenopathy.   Psychiatric/Behavioral: Negative.  Negative for behavioral problems.         Past Medical History:   Past Medical History:   Diagnosis Date    Acute pain of left knee     Depression     Hx of    Dyspnea     Edema     Frozen shoulder     Hypercholesterolemia     Hyperlipidemia     Hypertension     Hypothyroid      10/14 TSH 5.320;  04/15 TSH 4.580    Insomnia     Knee osteoarthritis     RT. meniscal scope, Dr. Alvino Cifuentes, 01/15. removal of ganglion cyst 01/15    Obesity (BMI 30.0-34.9)     BMI 33.66    Palpitations     Periarthritis of shoulder     Rotator cuff syndrome     Urinary incontinence     Wears glasses     READING       Past Surgical History:   Past Surgical History:   Procedure Laterality Date    APPENDECTOMY      BLADDER SURGERY      BREAST BIOPSY Left      SECTION      COLONOSCOPY  2017    GANGLION CYST EXCISION  2015    R KNEE    HYSTERECTOMY      INCISIONAL BREAST BIOPSY      JOINT REPLACEMENT      KNEE ARTHROSCOPY Right     OOPHORECTOMY      TONSILLECTOMY AND ADENOIDECTOMY      TOTAL ABDOMINAL HYSTERECTOMY WITH SALPINGO OOPHORECTOMY      TOTAL KNEE ARTHROPLASTY Right 2019    Procedure: TOTAL KNEE ARTHROPLASTY RIGHT;  Surgeon: Medardo Cosme MD;  Location: [facility];  Service: Orthopedics       Family History:   Family History   Problem Relation Age of Onset    Heart attack Mother         Acute MI    Hypertension Mother     Stroke Mother     Depression Mother     Heart disease Mother     Osteoarthritis Mother     Osteoporosis Mother     Heart attack Father 63            Heart disease Father     Hypertension Father     Anxiety disorder Daughter     Depression Daughter     Depression Other     Heart disease Other     Hypertension Other     Breast cancer Neg Hx     Ovarian cancer Neg Hx         Social History:   Social History     Socioeconomic History    Marital status:     Number of children: 1    Highest education level: Master's degree (e.g., MA, MS, Misty, MEd, MSW, SIMON)   Tobacco Use    Smoking status: Never     Passive exposure: Never    Smokeless tobacco: Never   Vaping Use    Vaping status: Never Used   Substance and Sexual Activity    Alcohol use: No    Drug use: No    Sexual activity: Yes     Partners: Male     Birth control/protection: Post-menopausal, Hysterectomy       Medications:   Current Outpatient Medications:     aspirin 81 MG EC tablet, Take 1 tablet by mouth Daily. Resume in 1 month, Disp: , Rfl:     buPROPion XL (WELLBUTRIN XL) 300 MG 24 hr tablet, Take 1 tablet by mouth Daily., Disp: 90 tablet, Rfl: 0    estradiol (ESTRACE VAGINAL) 0.1 MG/GM vaginal cream, Insert 1 gm intravaginally 2 times each week, Disp: 42.5 g, Rfl: 4    estradiol (ESTRACE) 0.5 MG tablet, Take 1 tablet by mouth Daily., Disp: 90 tablet, Rfl: 3    FLUoxetine (PROzac) 40 MG capsule, Take 1 capsule by mouth Daily., Disp: 90 capsule, Rfl: 0    losartan-hydrochlorothiazide (HYZAAR) 100-12.5 MG per tablet, , Disp: , Rfl:     rosuvastatin (CRESTOR) 10 MG tablet, Take 1 tablet by mouth Every Night., Disp: , Rfl: 0    Vibegron (Gemtesa) 75 MG tablet, Take 1 tablet by mouth Daily., Disp: 30 tablet, Rfl: 11    Vitamin D, Ergocalciferol, 58301 units capsule, Take 1 capsule by mouth Every 7 (Seven) Days., Disp: , Rfl:     Allergies:   Allergies   Allergen Reactions    Benadryl [Diphenhydramine] Other (See Comments)     SLEEPY    Adhesive Tape Rash    Percocet [Oxycodone-Acetaminophen] Other (See Comments)     SEVERE ITCHING IN HEAD    Wound Dressing Adhesive Rash       The following portions of the patient's history were reviewed and updated as appropriate: allergies, current medications, past family history, past medical history, past social history, past surgical history and problem list.        Objective   "  Objective      Vital Signs:   Vitals:    03/08/24 0921   BP: 122/76   Weight: 82 kg (180 lb 12.8 oz)   Height: 165.1 cm (65\")       Ortho Exam:  RIGHT shoulder pain    Results Review:  Imaging Results (Last 24 Hours)       ** No results found for the last 24 hours. **              Procedures    RIGHT SHOULDER SUBACROMIAL SPACE INJECTION: Risks and benefits of a shoulder subacromial space injection were discussed and the patient desired to proceed. Verbal consent was obtained. The patient understood the risk of infection, potential skin changes, bump in blood glucose especially with diabetes, nerve injury, possibility of increased pain in the short term, and possible incomplete pain relief.  Using sterile technique, the shoulder subacromial space was injected from a posterior approach with 1mL of 40 mg triamcinolone acetonide 40 MG/ML and 5cc of lidocaine with aspiration prior to injection. The patient tolerated the procedure without difficulty.  CPT CODE 56194 for major joint aspiration/injection          Assessment / Plan      Assessment/Plan:   Problem List Items Addressed This Visit          Musculoskeletal and Injuries    Nontraumatic complete tear of right rotator cuff - Primary    Relevant Orders    - Large Joint Arthrocentesis: R subacromial bursa    Rotator cuff impingement syndrome, right    Relevant Orders    - Large Joint Arthrocentesis: R subacromial bursa    Impingement syndrome of right shoulder    Relevant Orders    - Large Joint Arthrocentesis: R subacromial bursa       RIGHT SA injection 3/8/2024    Follow Up: 6 months - repeat right SA injection per patient preference      Gilbert Castanon MD, FAAOS  Orthopedic Surgeon  Fellowship Trained Shoulder and Elbow Surgeon  [facility]  [facility]  [address] [place] [address], [address]  [place] [address]    03/08/24  12:57 EST  "

## 2024-03-08 NOTE — PROGRESS NOTES
Procedure   - Large Joint Arthrocentesis: R subacromial bursa on 3/8/2024 9:29 AM  Indications: pain  Details: 21 G needle, posterior approach  Medications: 5 mL lidocaine PF 1% 1 %; 40 mg triamcinolone acetonide 40 MG/ML  Outcome: tolerated well, no immediate complications  Procedure, treatment alternatives, risks and benefits explained, specific risks discussed. Consent was given by the patient. Immediately prior to procedure a time out was called to verify the correct patient, procedure, equipment, support staff and site/side marked as required. Patient was prepped and draped in the usual sterile fashion.

## 2024-03-13 ENCOUNTER — TELEPHONE (OUTPATIENT)
Dept: UROLOGY | Facility: CLINIC | Age: 69
End: 2024-03-13
Payer: MEDICARE

## 2024-03-13 NOTE — TELEPHONE ENCOUNTER
I contacted patient concerning her PA for Gemtesa to see if [facility] had contacted her about cost and they hadn't. I was going to get the [facility] number off of her card but she informed to not worry about it right now that we could just wait because the gemtesa doesn't seem to be helping that much with her incontinence and that she had enough samples to take until her follow up in April.     Khloe, CMA

## 2024-03-19 ENCOUNTER — TELEMEDICINE (OUTPATIENT)
Dept: PSYCHIATRY | Facility: CLINIC | Age: 69
End: 2024-03-19
Payer: MEDICARE

## 2024-03-19 DIAGNOSIS — E66.09 CLASS 1 OBESITY DUE TO EXCESS CALORIES WITHOUT SERIOUS COMORBIDITY WITH BODY MASS INDEX (BMI) OF 32.0 TO 32.9 IN ADULT: ICD-10-CM

## 2024-03-19 DIAGNOSIS — F33.0 MILD EPISODE OF RECURRENT MAJOR DEPRESSIVE DISORDER: ICD-10-CM

## 2024-03-19 DIAGNOSIS — F41.1 GENERALIZED ANXIETY DISORDER: ICD-10-CM

## 2024-03-19 RX ORDER — FLUOXETINE HYDROCHLORIDE 40 MG/1
40 CAPSULE ORAL DAILY
Qty: 90 CAPSULE | Refills: 0 | Status: SHIPPED | OUTPATIENT
Start: 2024-03-19

## 2024-03-19 RX ORDER — BUPROPION HYDROCHLORIDE 300 MG/1
300 TABLET ORAL DAILY
Qty: 90 TABLET | Refills: 0 | Status: SHIPPED | OUTPATIENT
Start: 2024-03-19

## 2024-03-19 NOTE — PROGRESS NOTES
Subjective   Germaine Jett is a 68 y.o. female who presents today for follow up    Chief Complaint:  Depression and Anxiety    This provider is located at [facility], [address] [place] [address]. The Patient is seen remotely at home, using Epic Mychart. Patient is being seen via telehealth and stated they are in a secure environment for this session. The patient’s condition being diagnosed/treated is appropriate for telemedicine. The provider identified himself as well as his credentials.   The patient gave consent to be seen remotely, and when consent is given they understand that the consent allows for patient identifiable information to be sent to a third party as needed.   They may refuse to be seen remotely at any time. The electronic data is encrypted and password protected, and the patient has been advised of the potential risks to privacy not withstanding such measures      History of Present Illness: Patient presented today for follow-up.  Since last visit, she has been fairly stable but has had some increased irritability and dysphoria as she has not been taking her medication reliably.  She has not started the Wellbutrin back yet but can tell a difference when she does or does not take her Prozac.  She plans to get back on track with medications but has no major concerning symptoms at this time.  She denies SI/HI/AVH.      The following portions of the patient's history were reviewed and updated as appropriate: allergies, current medications, past family history, past medical history, past social history, past surgical history and problem list.      Past Medical History:  Past Medical History:   Diagnosis Date    Acute pain of left knee     Depression     Hx of    Dyspnea     Edema     Frozen shoulder 2021    Hypercholesterolemia     Hyperlipidemia     Hypertension     Hypothyroid      10/14 TSH 5.320;  04/15 TSH 4.580    Insomnia     Knee osteoarthritis     RT. meniscal shahriar, Dr. De Oliveira  Esau, 01/15. removal of ganglion cyst 01/15    Obesity (BMI 30.0-34.9)     BMI 33.66    Palpitations     Periarthritis of shoulder     Rotator cuff syndrome     Urinary incontinence     Wears glasses     READING       Social History:  Social History     Socioeconomic History    Marital status:     Number of children: 1    Highest education level: Master's degree (e.g., MA, MS, Misty, MEd, MSW, SIMON)   Tobacco Use    Smoking status: Never     Passive exposure: Never    Smokeless tobacco: Never   Vaping Use    Vaping status: Never Used   Substance and Sexual Activity    Alcohol use: No    Drug use: No    Sexual activity: Yes     Partners: Male     Birth control/protection: Post-menopausal, Hysterectomy       Family History:  Family History   Problem Relation Age of Onset    Heart attack Mother         Acute MI    Hypertension Mother     Stroke Mother     Depression Mother     Heart disease Mother     Osteoarthritis Mother     Osteoporosis Mother     Heart attack Father 63            Heart disease Father     Hypertension Father     Anxiety disorder Daughter     Depression Daughter     Depression Other     Heart disease Other     Hypertension Other     Breast cancer Neg Hx     Ovarian cancer Neg Hx        Past Surgical History:  Past Surgical History:   Procedure Laterality Date    APPENDECTOMY      BLADDER SURGERY      BREAST BIOPSY Left      SECTION      COLONOSCOPY  2017    GANGLION CYST EXCISION  2015    R KNEE    HYSTERECTOMY      INCISIONAL BREAST BIOPSY      JOINT REPLACEMENT      KNEE ARTHROSCOPY Right     OOPHORECTOMY      TONSILLECTOMY AND ADENOIDECTOMY      TOTAL ABDOMINAL HYSTERECTOMY WITH SALPINGO OOPHORECTOMY      TOTAL KNEE ARTHROPLASTY Right 2019    Procedure: TOTAL KNEE ARTHROPLASTY RIGHT;  Surgeon: Medardo Cosme MD;  Location: [facility];  Service: Orthopedics       Problem List:  Patient Active Problem List   Diagnosis    Palpitations    Hypertension     Hyperlipidemia    Obesity (BMI 30.0-34.9)    Hypercholesterolemia    Depression    Knee osteoarthritis    Hypothyroid    Insomnia    Primary osteoarthritis of right knee    Status post total right knee replacement    Leukocytosis, mild, likely reactive    Acute blood loss anemia, mild, asymptomatic    Acute postoperative pain    Nontraumatic complete tear of right rotator cuff    Rotator cuff impingement syndrome, right    Impingement syndrome of right shoulder       Allergy:   Allergies   Allergen Reactions    Benadryl [Diphenhydramine] Other (See Comments)     SLEEPY    Adhesive Tape Rash    Percocet [Oxycodone-Acetaminophen] Other (See Comments)     SEVERE ITCHING IN HEAD    Wound Dressing Adhesive Rash        Current Medications:   Current Outpatient Medications   Medication Sig Dispense Refill    aspirin 81 MG EC tablet Take 1 tablet by mouth Daily. Resume in 1 month      buPROPion XL (WELLBUTRIN XL) 300 MG 24 hr tablet Take 1 tablet by mouth Daily. 90 tablet 0    estradiol (ESTRACE VAGINAL) 0.1 MG/GM vaginal cream Insert 1 gm intravaginally 2 times each week 42.5 g 4    estradiol (ESTRACE) 0.5 MG tablet Take 1 tablet by mouth Daily. 90 tablet 3    FLUoxetine (PROzac) 40 MG capsule Take 1 capsule by mouth Daily. 90 capsule 0    losartan-hydrochlorothiazide (HYZAAR) 100-12.5 MG per tablet       rosuvastatin (CRESTOR) 10 MG tablet Take 1 tablet by mouth Every Night.  0    Vibegron (Gemtesa) 75 MG tablet Take 1 tablet by mouth Daily. 30 tablet 11    Vitamin D, Ergocalciferol, 18516 units capsule Take 1 capsule by mouth Every 7 (Seven) Days.       No current facility-administered medications for this visit.       Review of Symptoms:    Review of Systems   Constitutional:  Positive for activity change (improved) and fatigue.   HENT:  Positive for congestion, rhinorrhea and sore throat.    Respiratory:  Positive for cough.    Cardiovascular: Negative.    Gastrointestinal: Negative.    Neurological:  Positive for  headache.   Psychiatric/Behavioral:  Positive for dysphoric mood. Negative for agitation, behavioral problems, sleep disturbance and stress. The patient is not nervous/anxious.          Physical Exam:   not currently breastfeeding.  Video visit    Appearance:  female stated age in no acute distress  Gait, Station, Strength: Video visit    Mental Status Exam:     Hygiene:   good  Cooperation:  Cooperative  Eye Contact:  Good  Psychomotor Behavior:  Appropriate  Affect:  Full range  Mood: fluctates  Hopelessness: Denies  Speech:  Normal  Thought Process:  Goal directed and Linear  Thought Content:  Normal and Mood congruent  Suicidal:  None  Homicidal:  None  Hallucinations:  None  Delusion:  None  Memory:  Intact  Orientation:  Person, Place, Time and Situation  Reliability:  good  Insight:  Good  Judgement:  Good  Impulse Control:  Good  Physical/Medical Issues:  No        Lab Results:   Office Visit on 03/01/2024   Component Date Value Ref Range Status    Color 03/01/2024 Yellow  Yellow, Straw, Dark Yellow, Jaki Final    Clarity, UA 03/01/2024 Slightly Cloudy (A)  Clear Final    Specific Gravity  03/01/2024 1.030  1.005 - 1.030 Final    pH, Urine 03/01/2024 5.5  5.0 - 8.0 Final    Leukocytes 03/01/2024 Moderate (2+) (A)  Negative Final    Nitrite, UA 03/01/2024 Negative  Negative Final    Protein, POC 03/01/2024 Trace (A)  Negative mg/dL Final    Glucose, UA 03/01/2024 Negative  Negative mg/dL Final    Ketones, UA 03/01/2024 Negative  Negative Final    Urobilinogen, UA 03/01/2024 Normal  Normal, 0.2 E.U./dL Final    Bilirubin 03/01/2024 Negative  Negative Final    Blood, UA 03/01/2024 1+ (A)  Negative Final    Lot Number 03/01/2024 [id number]   Final    Expiration Date 03/01/2024 07/13/2025   Final       Assessment & Plan   Diagnoses and all orders for this visit:    1. Generalized anxiety disorder  -     FLUoxetine (PROzac) 40 MG capsule; Take 1 capsule by mouth Daily.  Dispense: 90 capsule; Refill:  0  -     buPROPion XL (WELLBUTRIN XL) 300 MG 24 hr tablet; Take 1 tablet by mouth Daily.  Dispense: 90 tablet; Refill: 0    2. Mild episode of recurrent major depressive disorder  -     FLUoxetine (PROzac) 40 MG capsule; Take 1 capsule by mouth Daily.  Dispense: 90 capsule; Refill: 0  -     buPROPion XL (WELLBUTRIN XL) 300 MG 24 hr tablet; Take 1 tablet by mouth Daily.  Dispense: 90 tablet; Refill: 0    3. Class 1 obesity due to excess calories without serious comorbidity with body mass index (BMI) of 32.0 to 32.9 in adult  -     buPROPion XL (WELLBUTRIN XL) 300 MG 24 hr tablet; Take 1 tablet by mouth Daily.  Dispense: 90 tablet; Refill: 0        -Patient overall continues to be fairly stable but again has not started Wellbutrin back and sometimes does not reliably take medications causing some dysphoria or irritability.  She does well when she is taking medications reliably and has no significant symptom burden at this time  -Reviewed previous available documentation  -Reviewed most recent available labs   -Continue Prozac 40 mg p.o. daily for mood and anxiety  -Continue Wellbutrin  mg p.o. daily for mood and anxiety as well as weight  -Obesity complicates all aspects of care  -Encouraged her continued healthy eating practices and physical activity as she has had some successful weight loss  -Encouraged to consider therapy.  -Approximate appointment time 11 AM to 11:15 AM via video visit      Visit Diagnoses:    ICD-10-CM ICD-9-CM   1. Generalized anxiety disorder  F41.1 300.02   2. Mild episode of recurrent major depressive disorder  F33.0 296.31   3. Class 1 obesity due to excess calories without serious comorbidity with body mass index (BMI) of 32.0 to 32.9 in adult  E66.09 278.00    Z68.32 V85.32           TREATMENT PLAN/GOALS: Continue supportive psychotherapy efforts and medications as indicated. Treatment and medication options discussed during today's visit. Patient acknowledged and verbally consented  to continue with current treatment plan and was educated on the importance of compliance with treatment and follow-up appointments.    MEDICATION ISSUES:    Discussed medication options and treatment plan of prescribed medication as well as the risks, benefits, and side effects including potential falls, possible impaired driving and metabolic adversities among others. Patient is agreeable to call the office with any worsening of symptoms or onset of side effects. Patient is agreeable to call 911 or go to the nearest ER should he/she begin having SI/HI.     MEDS ORDERED DURING VISIT:  New Medications Ordered This Visit   Medications    FLUoxetine (PROzac) 40 MG capsule     Sig: Take 1 capsule by mouth Daily.     Dispense:  90 capsule     Refill:  0    buPROPion XL (WELLBUTRIN XL) 300 MG 24 hr tablet     Sig: Take 1 tablet by mouth Daily.     Dispense:  90 tablet     Refill:  0       Return in about 3 months (around 6/19/2024).             This document has been electronically signed by Sim Knight MD  March 19, 2024 11:00 EDT

## 2024-05-15 ENCOUNTER — TELEPHONE (OUTPATIENT)
Dept: UROLOGY | Facility: CLINIC | Age: 69
End: 2024-05-15

## 2024-05-15 NOTE — TELEPHONE ENCOUNTER
Caller: Germaine Jett    Relationship to patient: Self    Best call back number: [phone]     Patient is needing: SAME DAY CANCEL APPOINTMENT. PT IS NOT FEELING WELL. SHE WILL CALL BACK LATER TO RESCHEDULE.

## 2024-05-21 ENCOUNTER — TELEMEDICINE (OUTPATIENT)
Dept: PSYCHIATRY | Facility: CLINIC | Age: 69
End: 2024-05-21
Payer: MEDICARE

## 2024-05-21 DIAGNOSIS — F41.1 GENERALIZED ANXIETY DISORDER: ICD-10-CM

## 2024-05-21 DIAGNOSIS — F33.0 MILD EPISODE OF RECURRENT MAJOR DEPRESSIVE DISORDER: Primary | ICD-10-CM

## 2024-05-21 DIAGNOSIS — E66.09 CLASS 1 OBESITY DUE TO EXCESS CALORIES WITHOUT SERIOUS COMORBIDITY WITH BODY MASS INDEX (BMI) OF 32.0 TO 32.9 IN ADULT: ICD-10-CM

## 2024-05-21 RX ORDER — FLUOXETINE HYDROCHLORIDE 40 MG/1
40 CAPSULE ORAL DAILY
Qty: 90 CAPSULE | Refills: 0 | Status: SHIPPED | OUTPATIENT
Start: 2024-05-21

## 2024-05-21 RX ORDER — BUPROPION HYDROCHLORIDE 300 MG/1
300 TABLET ORAL DAILY
Qty: 90 TABLET | Refills: 0 | Status: SHIPPED | OUTPATIENT
Start: 2024-05-21

## 2024-05-21 NOTE — PROGRESS NOTES
Subjective   Germaine Jett is a 69 y.o. female who presents today for follow up    Chief Complaint:  Depression and Anxiety    This provider is located at [facility], [address] [place] [address]. The Patient is seen remotely at home, using Epic Mychart. Patient is being seen via telehealth and stated they are in a secure environment for this session. The patient’s condition being diagnosed/treated is appropriate for telemedicine. The provider identified himself as well as his credentials.   The patient gave consent to be seen remotely, and when consent is given they understand that the consent allows for patient identifiable information to be sent to a third party as needed.   They may refuse to be seen remotely at any time. The electronic data is encrypted and password protected, and the patient has been advised of the potential risks to privacy not withstanding such measures      History of Present Illness: Patient presented today for follow-up.  Since last visit, she is more reliably taking her medication and has noted a difference.  Her frustration tolerance is better and her overall mood and coping has improved.  She is not having any medication side effects.  Sleep and appetite are stable.  She has been busy helping her daughter's family with the kids.  She will be taking a vacation girls trip with her daughter and the kids this summer and is looking forward to this.  She denies SI/HI/AVH.    The following portions of the patient's history were reviewed and updated as appropriate: allergies, current medications, past family history, past medical history, past social history, past surgical history and problem list.      Past Medical History:  Past Medical History:   Diagnosis Date    Acute pain of left knee     Depression     Hx of    Dyspnea     Edema     Frozen shoulder 2021    Hypercholesterolemia     Hyperlipidemia     Hypertension     Hypothyroid      10/14 TSH 5.320;  04/15 TSH 4.580     Insomnia     Knee osteoarthritis     RT. meniscal scope, Dr. Alvino Cifuentes, 01/15. removal of ganglion cyst 01/15    Obesity (BMI 30.0-34.9)     BMI 33.66    Palpitations     Periarthritis of shoulder     Rotator cuff syndrome     Urinary incontinence     Wears glasses     READING       Social History:  Social History     Socioeconomic History    Marital status:     Number of children: 1    Highest education level: Master's degree (e.g., MA, MS, Misty, MEd, MSW, SIMON)   Tobacco Use    Smoking status: Never     Passive exposure: Never    Smokeless tobacco: Never   Vaping Use    Vaping status: Never Used   Substance and Sexual Activity    Alcohol use: No    Drug use: No    Sexual activity: Yes     Partners: Male     Birth control/protection: Post-menopausal, Hysterectomy       Family History:  Family History   Problem Relation Age of Onset    Heart attack Mother         Acute MI    Hypertension Mother     Stroke Mother     Depression Mother     Heart disease Mother     Osteoarthritis Mother     Osteoporosis Mother     Heart attack Father 63            Heart disease Father     Hypertension Father     Anxiety disorder Daughter     Depression Daughter     Depression Other     Heart disease Other     Hypertension Other     Breast cancer Neg Hx     Ovarian cancer Neg Hx        Past Surgical History:  Past Surgical History:   Procedure Laterality Date    APPENDECTOMY      BLADDER SURGERY      BREAST BIOPSY Left      SECTION      COLONOSCOPY  2017    GANGLION CYST EXCISION  2015    R KNEE    HYSTERECTOMY      INCISIONAL BREAST BIOPSY      JOINT REPLACEMENT      KNEE ARTHROSCOPY Right     OOPHORECTOMY      TONSILLECTOMY AND ADENOIDECTOMY      TOTAL ABDOMINAL HYSTERECTOMY WITH SALPINGO OOPHORECTOMY      TOTAL KNEE ARTHROPLASTY Right 2019    Procedure: TOTAL KNEE ARTHROPLASTY RIGHT;  Surgeon: Medardo Cosme MD;  Location: [facility];  Service: Orthopedics       Problem List:  Patient Active  Problem List   Diagnosis    Palpitations    Hypertension    Hyperlipidemia    Obesity (BMI 30.0-34.9)    Hypercholesterolemia    Depression    Knee osteoarthritis    Hypothyroid    Insomnia    Primary osteoarthritis of right knee    Status post total right knee replacement    Leukocytosis, mild, likely reactive    Acute blood loss anemia, mild, asymptomatic    Acute postoperative pain    Nontraumatic complete tear of right rotator cuff    Rotator cuff impingement syndrome, right    Impingement syndrome of right shoulder       Allergy:   Allergies   Allergen Reactions    Benadryl [Diphenhydramine] Other (See Comments)     SLEEPY    Adhesive Tape Rash    Percocet [Oxycodone-Acetaminophen] Other (See Comments)     SEVERE ITCHING IN HEAD    Wound Dressing Adhesive Rash        Current Medications:   Current Outpatient Medications   Medication Sig Dispense Refill    aspirin 81 MG EC tablet Take 1 tablet by mouth Daily. Resume in 1 month      buPROPion XL (WELLBUTRIN XL) 300 MG 24 hr tablet Take 1 tablet by mouth Daily. 90 tablet 0    estradiol (ESTRACE VAGINAL) 0.1 MG/GM vaginal cream Insert 1 gm intravaginally 2 times each week 42.5 g 4    estradiol (ESTRACE) 0.5 MG tablet Take 1 tablet by mouth Daily. 90 tablet 3    FLUoxetine (PROzac) 40 MG capsule Take 1 capsule by mouth Daily. 90 capsule 0    losartan-hydrochlorothiazide (HYZAAR) 100-12.5 MG per tablet       rosuvastatin (CRESTOR) 10 MG tablet Take 1 tablet by mouth Every Night.  0    Vibegron (Gemtesa) 75 MG tablet Take 1 tablet by mouth Daily. 30 tablet 11    Vitamin D, Ergocalciferol, 72420 units capsule Take 1 capsule by mouth Every 7 (Seven) Days.       No current facility-administered medications for this visit.       Review of Symptoms:    Review of Systems   Constitutional:  Positive for fatigue. Negative for activity change (improved).   HENT:  Negative for congestion, rhinorrhea and sore throat.    Respiratory:  Negative for cough.    Cardiovascular:  Negative.    Gastrointestinal: Negative.    Neurological:  Positive for headache.   Psychiatric/Behavioral:  Negative for agitation, behavioral problems, dysphoric mood, sleep disturbance and stress. The patient is not nervous/anxious.          Physical Exam:   not currently breastfeeding.  Video visit    Appearance:  female stated age in no acute distress  Gait, Station, Strength: Video visit    Mental Status Exam:     Hygiene:   good  Cooperation:  Cooperative  Eye Contact:  Good  Psychomotor Behavior:  Appropriate  Affect:  Full range  Mood: normal  Hopelessness: Denies  Speech:  Normal  Thought Process:  Goal directed and Linear  Thought Content:  Normal and Mood congruent  Suicidal:  None  Homicidal:  None  Hallucinations:  None  Delusion:  None  Memory:  Intact  Orientation:  Person, Place, Time and Situation  Reliability:  good  Insight:  Good  Judgement:  Good  Impulse Control:  Good  Physical/Medical Issues:  No        Lab Results:   No visits with results within 1 Month(s) from this visit.   Latest known visit with results is:   Office Visit on 03/01/2024   Component Date Value Ref Range Status    Color 03/01/2024 Yellow  Yellow, Straw, Dark Yellow, Jaki Final    Clarity, UA 03/01/2024 Slightly Cloudy (A)  Clear Final    Specific Gravity  03/01/2024 1.030  1.005 - 1.030 Final    pH, Urine 03/01/2024 5.5  5.0 - 8.0 Final    Leukocytes 03/01/2024 Moderate (2+) (A)  Negative Final    Nitrite, UA 03/01/2024 Negative  Negative Final    Protein, POC 03/01/2024 Trace (A)  Negative mg/dL Final    Glucose, UA 03/01/2024 Negative  Negative mg/dL Final    Ketones, UA 03/01/2024 Negative  Negative Final    Urobilinogen, UA 03/01/2024 Normal  Normal, 0.2 E.U./dL Final    Bilirubin 03/01/2024 Negative  Negative Final    Blood, UA 03/01/2024 1+ (A)  Negative Final    Lot Number 03/01/2024 [id number]   Final    Expiration Date 03/01/2024 07/13/2025   Final       Assessment & Plan   Diagnoses and all orders  for this visit:    1. Mild episode of recurrent major depressive disorder (Primary)  -     buPROPion XL (WELLBUTRIN XL) 300 MG 24 hr tablet; Take 1 tablet by mouth Daily.  Dispense: 90 tablet; Refill: 0  -     FLUoxetine (PROzac) 40 MG capsule; Take 1 capsule by mouth Daily.  Dispense: 90 capsule; Refill: 0    2. Generalized anxiety disorder  -     buPROPion XL (WELLBUTRIN XL) 300 MG 24 hr tablet; Take 1 tablet by mouth Daily.  Dispense: 90 tablet; Refill: 0  -     FLUoxetine (PROzac) 40 MG capsule; Take 1 capsule by mouth Daily.  Dispense: 90 capsule; Refill: 0    3. Class 1 obesity due to excess calories without serious comorbidity with body mass index (BMI) of 32.0 to 32.9 in adult  -     buPROPion XL (WELLBUTRIN XL) 300 MG 24 hr tablet; Take 1 tablet by mouth Daily.  Dispense: 90 tablet; Refill: 0          -Patient doing very well.  She is back to taking medication reliably and has noted an improvement.  No major issue noted today.  -Reviewed previous available documentation  -Reviewed most recent available labs   -Continue Prozac 40 mg p.o. daily for mood and anxiety  -Continue Wellbutrin  mg p.o. daily for mood and anxiety as well as weight  -Obesity complicates all aspects of care  -Encouraged her continued healthy eating practices and physical activity as she has had some successful weight loss  -Encouraged to consider therapy.  -Approximate appointment time 10:10 AM to 10:25 AM via video visit      Visit Diagnoses:    ICD-10-CM ICD-9-CM   1. Mild episode of recurrent major depressive disorder  F33.0 296.31   2. Generalized anxiety disorder  F41.1 300.02   3. Class 1 obesity due to excess calories without serious comorbidity with body mass index (BMI) of 32.0 to 32.9 in adult  E66.09 278.00    Z68.32 V85.32             TREATMENT PLAN/GOALS: Continue supportive psychotherapy efforts and medications as indicated. Treatment and medication options discussed during today's visit. Patient acknowledged and  verbally consented to continue with current treatment plan and was educated on the importance of compliance with treatment and follow-up appointments.    MEDICATION ISSUES:    Discussed medication options and treatment plan of prescribed medication as well as the risks, benefits, and side effects including potential falls, possible impaired driving and metabolic adversities among others. Patient is agreeable to call the office with any worsening of symptoms or onset of side effects. Patient is agreeable to call 911 or go to the nearest ER should he/she begin having SI/HI.     MEDS ORDERED DURING VISIT:  New Medications Ordered This Visit   Medications    buPROPion XL (WELLBUTRIN XL) 300 MG 24 hr tablet     Sig: Take 1 tablet by mouth Daily.     Dispense:  90 tablet     Refill:  0    FLUoxetine (PROzac) 40 MG capsule     Sig: Take 1 capsule by mouth Daily.     Dispense:  90 capsule     Refill:  0       Return in about 3 months (around 8/21/2024).             This document has been electronically signed by Sim Knight MD  May 21, 2024 10:00 EDT

## 2024-06-03 ENCOUNTER — OFFICE VISIT (OUTPATIENT)
Dept: UROLOGY | Facility: CLINIC | Age: 69
End: 2024-06-03
Payer: MEDICARE

## 2024-06-03 VITALS
HEART RATE: 60 BPM | DIASTOLIC BLOOD PRESSURE: 70 MMHG | SYSTOLIC BLOOD PRESSURE: 148 MMHG | TEMPERATURE: 97.1 F | BODY MASS INDEX: 29.65 KG/M2 | OXYGEN SATURATION: 100 % | WEIGHT: 178.2 LBS

## 2024-06-03 DIAGNOSIS — N39.46 MIXED STRESS AND URGE URINARY INCONTINENCE: Primary | ICD-10-CM

## 2024-06-03 LAB
BILIRUB BLD-MCNC: NEGATIVE MG/DL
CLARITY, POC: CLEAR
COLOR UR: NORMAL
EXPIRATION DATE: NORMAL
GLUCOSE UR STRIP-MCNC: NEGATIVE MG/DL
KETONES UR QL: NEGATIVE
LEUKOCYTE EST, POC: NEGATIVE
Lab: NORMAL
NITRITE UR-MCNC: NEGATIVE MG/ML
PH UR: 6 [PH] (ref 5–8)
PROT UR STRIP-MCNC: NEGATIVE MG/DL
RBC # UR STRIP: NEGATIVE /UL
SP GR UR: 1.02 (ref 1–1.03)
UROBILINOGEN UR QL: NORMAL

## 2024-06-03 PROCEDURE — 1159F MED LIST DOCD IN RCRD: CPT | Performed by: NURSE PRACTITIONER

## 2024-06-03 PROCEDURE — 51798 US URINE CAPACITY MEASURE: CPT | Performed by: NURSE PRACTITIONER

## 2024-06-03 PROCEDURE — 81003 URINALYSIS AUTO W/O SCOPE: CPT | Performed by: NURSE PRACTITIONER

## 2024-06-03 PROCEDURE — 1160F RVW MEDS BY RX/DR IN RCRD: CPT | Performed by: NURSE PRACTITIONER

## 2024-06-03 PROCEDURE — 99214 OFFICE O/P EST MOD 30 MIN: CPT | Performed by: NURSE PRACTITIONER

## 2024-06-03 PROCEDURE — 3078F DIAST BP <80 MM HG: CPT | Performed by: NURSE PRACTITIONER

## 2024-06-03 PROCEDURE — 3077F SYST BP >= 140 MM HG: CPT | Performed by: NURSE PRACTITIONER

## 2024-06-03 RX ORDER — ONDANSETRON HYDROCHLORIDE 8 MG/1
TABLET, FILM COATED ORAL
COMMUNITY
Start: 2024-03-27

## 2024-06-03 RX ORDER — VALACYCLOVIR HYDROCHLORIDE 1 G/1
TABLET, FILM COATED ORAL
COMMUNITY
Start: 2024-03-28

## 2024-06-03 NOTE — PROGRESS NOTES
Office Visit General Established Female Patient     Patient Name: Germaine Jett  : 1955   MRN: [id number]     Chief Complaint:   Chief Complaint   Patient presents with    Urinary Incontinence    Follow-up       Referring Provider: No ref. provider found    History of Present Illness: Germaine Jett is a 69 y.o. female with history below in assessment, who presents for follow up with history of mixed urinary incontinence. In March she was started on Gemtesa and instructed on bladder control strategies.  She is here today for JOSE evaluation.      She took Gemtesa for 6 weeks with 1 day of symptom improvement.  Insurance also refused to pay for medication and it was going to cost her >$400 per month.  She is here today for JOSE evaluation.  Admits to using vaginal estrace cream when she remembers, does not use consistently.  No new complaints, overall doing well. UUI continues to bother her the most.     Subjective      Review of System: ROS reviewed by me and is negative unless otherwise noted in HPI.      Past Medical History:   Past Medical History:   Diagnosis Date    Acute pain of left knee     Depression     Hx of    Dyspnea     Edema     Frozen shoulder     Hypercholesterolemia     Hyperlipidemia     Hypertension     Hypothyroid      10/14 TSH 5.320;  04/15 TSH 4.580    Insomnia     Knee osteoarthritis     RT. meniscal scope, Dr. Alvino Cifuentes, 01/15. removal of ganglion cyst 01/15    Obesity (BMI 30.0-34.9)     BMI 33.66    Palpitations     Periarthritis of shoulder     Rotator cuff syndrome     Urinary incontinence     Wears glasses     READING     Past Surgical History:   Past Surgical History:   Procedure Laterality Date    APPENDECTOMY      BLADDER SURGERY      BREAST BIOPSY Left      SECTION      COLONOSCOPY  2017    GANGLION CYST EXCISION  2015    R KNEE    HYSTERECTOMY      INCISIONAL BREAST BIOPSY      JOINT REPLACEMENT      KNEE ARTHROSCOPY Right      OOPHORECTOMY      TONSILLECTOMY AND ADENOIDECTOMY      TOTAL ABDOMINAL HYSTERECTOMY WITH SALPINGO OOPHORECTOMY      TOTAL KNEE ARTHROPLASTY Right 2019    Procedure: TOTAL KNEE ARTHROPLASTY RIGHT;  Surgeon: Medardo Cosme MD;  Location: [facility] OR;  Service: Orthopedics     Family History:   Family History   Problem Relation Age of Onset    Heart attack Mother         Acute MI    Hypertension Mother     Stroke Mother     Depression Mother     Heart disease Mother     Osteoarthritis Mother     Osteoporosis Mother     Heart attack Father 63            Heart disease Father     Hypertension Father     Anxiety disorder Daughter     Depression Daughter     Depression Other     Heart disease Other     Hypertension Other     Breast cancer Neg Hx     Ovarian cancer Neg Hx      Social History:   Social History     Socioeconomic History    Marital status:     Number of children: 1    Highest education level: Master's degree (e.g., MA, MS, Misty, MEd, MSW, SIMON)   Tobacco Use    Smoking status: Never     Passive exposure: Never    Smokeless tobacco: Never   Vaping Use    Vaping status: Never Used   Substance and Sexual Activity    Alcohol use: Never    Drug use: Never    Sexual activity: Yes     Partners: Male     Birth control/protection: Hysterectomy     Medications:     Current Outpatient Medications:     aspirin 81 MG EC tablet, Take 1 tablet by mouth Daily. Resume in 1 month, Disp: , Rfl:     buPROPion XL (WELLBUTRIN XL) 300 MG 24 hr tablet, Take 1 tablet by mouth Daily., Disp: 90 tablet, Rfl: 0    estradiol (ESTRACE VAGINAL) 0.1 MG/GM vaginal cream, Insert 1 gm intravaginally 2 times each week, Disp: 42.5 g, Rfl: 4    estradiol (ESTRACE) 0.5 MG tablet, Take 1 tablet by mouth Daily., Disp: 90 tablet, Rfl: 3    FLUoxetine (PROzac) 40 MG capsule, Take 1 capsule by mouth Daily., Disp: 90 capsule, Rfl: 0    losartan-hydrochlorothiazide (HYZAAR) 100-12.5 MG per tablet, , Disp: , Rfl:     rosuvastatin (CRESTOR) 10  MG tablet, Take 1 tablet by mouth Every Night., Disp: , Rfl: 0    ondansetron (ZOFRAN) 8 MG tablet, , Disp: , Rfl:     valACYclovir (VALTREX) 1000 MG tablet, , Disp: , Rfl:     Allergies:   Allergies   Allergen Reactions    Benadryl [Diphenhydramine] Other (See Comments)     SLEEPY    Adhesive Tape Rash    Percocet [Oxycodone-Acetaminophen] Other (See Comments)     SEVERE ITCHING IN HEAD    Wound Dressing Adhesive Rash     Objective     Physical Exam:   Vital Signs:   Visit Vitals  /70 (BP Location: Left arm, Patient Position: Sitting, Cuff Size: Adult)   Pulse 60   Temp 97.1 °F (36.2 °C) (Temporal)   Wt 80.8 kg (178 lb 3.2 oz)   SpO2 100%   BMI 29.65 kg/m²      Body mass index is 29.65 kg/m².   Physical Exam  Vitals and nursing note reviewed. Exam conducted with a chaperone present.   Constitutional:       General: She is not in acute distress.     Appearance: Normal appearance. She is not ill-appearing.   HENT:      Head: Normocephalic and atraumatic.   Pulmonary:      Effort: Pulmonary effort is normal.   Genitourinary:     Pubic Area: No rash.       Labia:         Right: No rash.         Left: No rash.       Urethra: No prolapse.      Comments: Pale vaginal mucosa, decreased vaginal rugation, minimal urethral mobility.  JOSE witnessed with light cough.  Grade 1 cystocele, no rectocele.    Skin:     General: Skin is warm and dry.   Neurological:      General: No focal deficit present.      Mental Status: She is alert and oriented to person, place, and time.   Psychiatric:         Mood and Affect: Mood normal.         Behavior: Behavior normal.        Labs  Brief Urine Lab Results  (Last result in the past 365 days)        Color   Clarity   Blood   Leuk Est   Nitrite   Protein   CREAT   Urine HCG        06/03/24 1131 Straw   Clear   Negative   Negative   Negative   Negative                 Lab Results   Component Value Date    GLUCOSE 149 (H) 09/27/2019    CALCIUM 8.9 09/27/2019     09/27/2019    K 3.8  09/27/2019    CO2 22.0 09/27/2019     09/27/2019    BUN 13 09/27/2019    CREATININE 0.96 09/27/2019    EGFRIFNONA 58 (L) 09/27/2019    BCR 13.5 09/27/2019    ANIONGAP 12.0 09/27/2019     Lab Results   Component Value Date    WBC 11.18 (H) 09/27/2019    HGB 10.4 (L) 09/27/2019    HCT 33.1 (L) 09/27/2019    MCV 98.5 (H) 09/27/2019     09/27/2019     Radiographic Studies  None to review.      I have reviewed the above labs and imaging.     PVR:  2 ml.      Assessment / Plan      Assessment/Plan:  Ms. Jett presented today to discussed mixed urinary incontinence. She did not see much benefit in symptom improvement when taking Gemtesa and the medication was expensive.  UA was benign.  PVR 2 ml today.      We discussed the AUA guidelines for urge urinary incontinence. We discussed lifestyle changes such as weight reduction and caffeine reduction, as well as medications such as anticholinergics and beta agonist. She has tried these without improvement in symptoms.  She is not interested in trying PFPT at this time as she doesn't feel she will have time to attend sessions.      We discussed third line therapies, such as Botox and InterStim. She was given information on both and she would like to think about It and let me know.  We discussed the risks, benefits, and alternatives to this approach. Patient to call me with her decision.      Diagnoses and all orders for this visit:    1. Mixed stress and urge urinary incontinence (Primary)  -     POC Urinalysis Dipstick, Automated    Other orders  -     Cancel: POC Urinalysis Dipstick, Automated     Follow Up:   Return for Patient to let me know her decision regarding bladder botox vs interstim.    Elva Kee, APRN, MSN, FNP-C  [facility] [place]

## 2024-06-28 ENCOUNTER — OFFICE VISIT (OUTPATIENT)
Dept: ORTHOPEDIC SURGERY | Facility: CLINIC | Age: 69
End: 2024-06-28
Payer: MEDICARE

## 2024-06-28 VITALS
SYSTOLIC BLOOD PRESSURE: 120 MMHG | DIASTOLIC BLOOD PRESSURE: 80 MMHG | WEIGHT: 174.4 LBS | HEIGHT: 65 IN | BODY MASS INDEX: 29.06 KG/M2

## 2024-06-28 DIAGNOSIS — M75.41 IMPINGEMENT SYNDROME OF RIGHT SHOULDER: ICD-10-CM

## 2024-06-28 DIAGNOSIS — M75.41 ROTATOR CUFF IMPINGEMENT SYNDROME, RIGHT: ICD-10-CM

## 2024-06-28 DIAGNOSIS — M75.121 NONTRAUMATIC COMPLETE TEAR OF RIGHT ROTATOR CUFF: Primary | ICD-10-CM

## 2024-06-28 RX ORDER — LIDOCAINE HYDROCHLORIDE 10 MG/ML
5 INJECTION, SOLUTION EPIDURAL; INFILTRATION; INTRACAUDAL; PERINEURAL
Status: COMPLETED | OUTPATIENT
Start: 2024-06-28 | End: 2024-06-28

## 2024-06-28 RX ORDER — TRIAMCINOLONE ACETONIDE 40 MG/ML
40 INJECTION, SUSPENSION INTRA-ARTICULAR; INTRAMUSCULAR
Status: COMPLETED | OUTPATIENT
Start: 2024-06-28 | End: 2024-06-28

## 2024-06-28 RX ADMIN — TRIAMCINOLONE ACETONIDE 40 MG: 40 INJECTION, SUSPENSION INTRA-ARTICULAR; INTRAMUSCULAR at 11:11

## 2024-06-28 RX ADMIN — LIDOCAINE HYDROCHLORIDE 5 ML: 10 INJECTION, SOLUTION EPIDURAL; INFILTRATION; INTRACAUDAL; PERINEURAL at 11:11

## 2024-06-28 NOTE — PROGRESS NOTES
[facility] Orthopaedic Surgery Office Follow Up - Gilbert Castanon MD  [facility]    Office Follow Up Visit       Patient Name: Germaine Jett    Chief Complaint:   Chief Complaint   Patient presents with    Follow-up     3.5 month follow-up: Nontraumatic complete tear of right rotator cuff       Referring Physician: No ref. provider found    History of Present Illness:   It has been 3.5  month(s) since Germaine Jett's last visit. Germaine Jett returns to clinic today for F/U: follow-up of rightBody Part: shoulderReason: pain. The issue has been ongoing for 4.5 year(s). Germaine Jett rates HIS/HER: herpain at 6/10 on the pain scale. Previous/current treatments: NSAIDS and steroid injection (last injection 3/8/24). Current symptoms:Symptoms: pain. The pain is worse with sleeping, working, and lying on affected side; pain medication and/or NSAID improves the pain. Overall, he/she: sheis doing better. I have reviewed the patient's history of present illness as noted/entered above.    I have reviewed the patient's past medical history, surgical history, social history, family history, medications, and allergies as noted in the electronic medical record and as noted/entered.  I have reviewed the patient's review of systems as noted/enter and updated as noted in the patient's HPI.      RIGHT SHOULDER  2 years of pain off/on  Full-thickness tearing noted on recent MRI.  Daughter- Susi JettYAMILKA  She loves taking care of her 6-year-old granddaughter and 3-year-old grandson  She remains very active  Counseled on operative versus nonoperative measures and desires to hold on operative measures at this time which is reasonable.  She understands tear progression can occur over time but she has been dealing with this off and on for more than 2 years     Shots have helped -- last was 12/21/2022  Celeste      Friends with Cora Jara        68 y.o. female  Body mass index is 30.29 kg/m².        MRI Shoulder Right Without Contrast     Result Date: 4/21/2023  Impression: Rotator cuff tendinopathy with focal full-thickness tear of the distal posterior supraspinatus tendon. Additionally there is low-grade interstitial partial tear of the superior aspect of the mid infraspinatus tendon. No focal muscular atrophy. Intra-articular biceps tendinopathy. Mild subcoracoid bursitis. Mild degenerative change of the glenohumeral and acromioclavicular joints. Electronically Signed: Sanchez Silva  4/21/2023 3:55 PM EDT  Workstation ID: [id number]        I reviewed the MRI above and right shoulder 3 views 4/12/2023 within the system.  She has full-thickness fairly medialized tearing of the supraspinatus with a lateral stump of tissue remaining on the greater tuberosity.  She does have positive tangent sign indicative of supraspinatus fatty infiltration/atrophy of the muscle        9/5/2023:  The prior injection right subacromial on 5/23/2023 took away all of her pain and lasted until 9/3/2023 she had an exacerbation lifting overhead     Susi asked pursuing her doctorate     Cora-with her today.     Overall she is doing fantastic with exacerbation led to increased pain.  Overall she strongly desires to avoid surgery and similar findings for , Simone, and he has had a successful nonoperative course as well        3/8/2024:  RIGHT SA injection 3/8/2024  She and Cora came up together again today       6/28/2024 Desires repeat RIGHT SA injection    She lives with are going to [place] in mid July.  She is looking forward to it.      Subjective   Subjective      Review of Systems   Constitutional: Negative.  Negative for chills, fatigue and fever.   HENT: Negative.  Negative for congestion and dental problem.    Eyes: Negative.  Negative for blurred vision.   Respiratory: Negative.  Negative for shortness of breath.     Cardiovascular: Negative.  Negative for leg swelling.   Gastrointestinal: Negative.  Negative for abdominal pain.   Endocrine: Negative.  Negative for polyuria.   Genitourinary: Negative.  Negative for difficulty urinating.   Musculoskeletal:  Positive for arthralgias.   Skin: Negative.    Allergic/Immunologic: Negative.    Neurological: Negative.    Hematological: Negative.  Negative for adenopathy.   Psychiatric/Behavioral: Negative.  Negative for behavioral problems.         Past Medical History:   Past Medical History:   Diagnosis Date    Acute pain of left knee     Depression     Hx of    Dyspnea     Edema     Frozen shoulder     Hypercholesterolemia     Hyperlipidemia     Hypertension     Hypothyroid      10/14 TSH 5.320;  04/15 TSH 4.580    Insomnia     Knee osteoarthritis     RT. meniscal scope, Dr. Alvino Cifuentes, 01/15. removal of ganglion cyst 01/15    Obesity (BMI 30.0-34.9)     BMI 33.66    Palpitations     Periarthritis of shoulder     Rotator cuff syndrome     Urinary incontinence     Wears glasses     READING       Past Surgical History:   Past Surgical History:   Procedure Laterality Date    APPENDECTOMY      BLADDER SURGERY      BREAST BIOPSY Left      SECTION      COLONOSCOPY  2017    GANGLION CYST EXCISION  2015    R KNEE    HYSTERECTOMY      INCISIONAL BREAST BIOPSY      JOINT REPLACEMENT      KNEE ARTHROSCOPY Right     OOPHORECTOMY      TONSILLECTOMY AND ADENOIDECTOMY      TOTAL ABDOMINAL HYSTERECTOMY WITH SALPINGO OOPHORECTOMY      TOTAL KNEE ARTHROPLASTY Right 2019    Procedure: TOTAL KNEE ARTHROPLASTY RIGHT;  Surgeon: Medardo Cosme MD;  Location: [facility];  Service: Orthopedics       Family History:   Family History   Problem Relation Age of Onset    Heart attack Mother         Acute MI    Hypertension Mother     Stroke Mother     Depression Mother     Heart disease Mother     Osteoarthritis Mother     Osteoporosis Mother     Heart attack Father 63             Heart disease Father     Hypertension Father     Anxiety disorder Daughter     Depression Daughter     Depression Other     Heart disease Other     Hypertension Other     Breast cancer Neg Hx     Ovarian cancer Neg Hx        Social History:   Social History     Socioeconomic History    Marital status:     Number of children: 1    Highest education level: Master's degree (e.g., MA, MS, Misty, MEd, MSW, SIMON)   Tobacco Use    Smoking status: Never     Passive exposure: Never    Smokeless tobacco: Never   Vaping Use    Vaping status: Never Used   Substance and Sexual Activity    Alcohol use: Never    Drug use: Never    Sexual activity: Yes     Partners: Male     Birth control/protection: Hysterectomy       Medications:   Current Outpatient Medications:     aspirin 81 MG EC tablet, Take 1 tablet by mouth Daily. Resume in 1 month, Disp: , Rfl:     buPROPion XL (WELLBUTRIN XL) 300 MG 24 hr tablet, Take 1 tablet by mouth Daily., Disp: 90 tablet, Rfl: 0    estradiol (ESTRACE VAGINAL) 0.1 MG/GM vaginal cream, Insert 1 gm intravaginally 2 times each week, Disp: 42.5 g, Rfl: 4    estradiol (ESTRACE) 0.5 MG tablet, Take 1 tablet by mouth Daily., Disp: 90 tablet, Rfl: 3    FLUoxetine (PROzac) 40 MG capsule, Take 1 capsule by mouth Daily., Disp: 90 capsule, Rfl: 0    losartan-hydrochlorothiazide (HYZAAR) 100-12.5 MG per tablet, , Disp: , Rfl:     rosuvastatin (CRESTOR) 10 MG tablet, Take 1 tablet by mouth Every Night., Disp: , Rfl: 0    Allergies:   Allergies   Allergen Reactions    Benadryl [Diphenhydramine] Other (See Comments)     SLEEPY    Adhesive Tape Rash    Percocet [Oxycodone-Acetaminophen] Other (See Comments)     SEVERE ITCHING IN HEAD    Wound Dressing Adhesive Rash       The following portions of the patient's history were reviewed and updated as appropriate: allergies, current medications, past family history, past medical history, past social history, past surgical history and problem list.       "  Objective    Objective      Vital Signs:   Vitals:    06/28/24 1052   BP: 120/80   Weight: 79.1 kg (174 lb 6.4 oz)   Height: 165.1 cm (65\")       Ortho Exam:  RIGHT shoulder pain    Results Review:  Imaging Results (Last 24 Hours)       ** No results found for the last 24 hours. **              - Large Joint Arthrocentesis: R subacromial bursa on 6/28/2024 11:11 AM  Indications: pain  Details: 21 G needle, posterior approach  Medications: 5 mL lidocaine PF 1% 1 %; 40 mg triamcinolone acetonide 40 MG/ML  Outcome: tolerated well, no immediate complications  Procedure, treatment alternatives, risks and benefits explained, specific risks discussed. Consent was given by the patient. Immediately prior to procedure a time out was called to verify the correct patient, procedure, equipment, support staff and site/side marked as required. Patient was prepped and draped in the usual sterile fashion.         RIGHT SHOULDER SUBACROMIAL SPACE INJECTION: Risks and benefits of a shoulder subacromial space injection were discussed and the patient desired to proceed. Verbal consent was obtained. The patient understood the risk of infection, potential skin changes, bump in blood glucose especially with diabetes, nerve injury, possibility of increased pain in the short term, and possible incomplete pain relief.  Using sterile technique, the shoulder subacromial space was injected from a posterior approach with 1mL of 40 mg triamcinolone acetonide 40 MG/ML and 5cc of lidocaine with aspiration prior to injection. The patient tolerated the procedure without difficulty.  CPT CODE 37446 for major joint aspiration/injection          Assessment / Plan      Assessment/Plan:   Problem List Items Addressed This Visit          Musculoskeletal and Injuries    Nontraumatic complete tear of right rotator cuff - Primary    Relevant Orders    - Large Joint Arthrocentesis: R subacromial bursa    Rotator cuff impingement syndrome, right    Relevant " Orders    - Large Joint Arthrocentesis: R subacromial bursa    Impingement syndrome of right shoulder    Relevant Orders    - Large Joint Arthrocentesis: R subacromial bursa       6/28/2024 Desires repeat RIGHT SA injection  Desires to avoid surgery and injections have been effective for her    Excellent relief today from the injection as well.  She desires follow-up in 3 months to consider repeat injection at that time    Follow Up: 3 months right shoulder subacromial injection, no x-rays needed      Gilbert Castanon MD, FAAOS  Orthopedic Surgeon, Shoulder Surgery  [facility]  Orthopedics and Sports Medicine  [address], [address]  [place] [address]    & New Location:  [facility] Location  [address], [address]  [place] [address]    06/28/24  11:32 EDT

## 2024-07-10 ENCOUNTER — OFFICE VISIT (OUTPATIENT)
Dept: CARDIOLOGY | Facility: CLINIC | Age: 69
End: 2024-07-10
Payer: MEDICARE

## 2024-07-10 VITALS
DIASTOLIC BLOOD PRESSURE: 78 MMHG | HEIGHT: 65 IN | WEIGHT: 174 LBS | BODY MASS INDEX: 28.99 KG/M2 | SYSTOLIC BLOOD PRESSURE: 122 MMHG | OXYGEN SATURATION: 95 % | HEART RATE: 73 BPM

## 2024-07-10 DIAGNOSIS — R00.2 PALPITATIONS: Primary | ICD-10-CM

## 2024-07-10 DIAGNOSIS — R07.2 PRECORDIAL PAIN: ICD-10-CM

## 2024-07-10 DIAGNOSIS — R42 DIZZINESS: ICD-10-CM

## 2024-07-10 PROCEDURE — 93000 ELECTROCARDIOGRAM COMPLETE: CPT | Performed by: INTERNAL MEDICINE

## 2024-07-10 PROCEDURE — 3078F DIAST BP <80 MM HG: CPT | Performed by: INTERNAL MEDICINE

## 2024-07-10 PROCEDURE — 99204 OFFICE O/P NEW MOD 45 MIN: CPT | Performed by: INTERNAL MEDICINE

## 2024-07-10 PROCEDURE — 3074F SYST BP LT 130 MM HG: CPT | Performed by: INTERNAL MEDICINE

## 2024-07-10 NOTE — PROGRESS NOTES
[facility] Cardiology  Consultation H&P  Germaineredd Daigle Maliha  1955  [phone]  There is no work phone number on file..    VISIT DATE:  07/10/2024    PCP: Susi Jett, YAMILKA  [address] [facility] [place] [address]    CC:  Chief Complaint   Patient presents with    Premature Atrial Contraction           ASSESSMENT:   Diagnosis Plan   1. Palpitations  Holter Monitor - 72 Hour Up To 15 Days    Adult Transthoracic Echo Complete W/ Cont if Necessary Per Protocol      2. Precordial pain  Stress Test With Myocardial Perfusion (1 Day)      3. Dizziness  Duplex Carotid Ultrasound CAR            PLAN:  -Holter monitor, 2-week: Quantify burden of supraventricular ectopy, symptom to rhythm correlation with episodes of lightheadedness.  -Transthoracic echo pending to assess underlying myocardial structure and function  -Exercise myocardial perfusion imaging for ischemia evaluation  -Carotid duplex imaging pending.  -Keep home blood pressure log.    History of Present Illness   69-year-old female with history of hypertension dyslipidemia, family history of coronary disease.  Presenting with palpitations, chest discomfort and intermittent lightheadedness.  Describes episodes of left arm heaviness and tingling.  No obvious triggers but does appear to happen more commonly when she is active throughout the day.  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  No other associated symptoms.  Intermittent sharp precordial chest discomfort which is transient in nature.  More recently she has had episodes in which she suddenly feels an unusual sensation in her head, almost like transient lightheadedness.  No obvious triggers.  Occurring over the previous 4 to 6 weeks.  Has had history of intermittent brief tachypalpitations.  Recent twelve-lead EKGs have revealed sinus rhythm with premature atrial contractions.    PHYSICAL EXAMINATION:  Vitals:    07/10/24 0836   BP: 122/78   BP Location: Left arm   Patient Position:  "Sitting   Cuff Size: Adult   Pulse: 73   SpO2: 95%   Weight: 78.9 kg (174 lb)   Height: 165.1 cm (65\")     General Appearance:    Alert, cooperative, no distress, appears stated age   Head:    Normocephalic, without obvious abnormality, atraumatic   Eyes:    conjunctiva/corneas clear, EOM's intact, fundi     benign, both eyes   Ears:    Normal TM's and external ear canals, both ears   Nose:   Nares normal, septum midline, mucosa normal, no drainage    or sinus tenderness   Throat:   Lips, mucosa, and tongue normal; teeth and gums normal   Neck:   Supple, symmetrical, trachea midline, no adenopathy;     thyroid:  no enlargement/tenderness/nodules; no carotid    bruit or JVD   Back:     Symmetric, no curvature, ROM normal, no CVA tenderness   Lungs:     Clear to auscultation bilaterally, respirations unlabored   Chest Wall:    No tenderness or deformity    Heart:    Regular rate and rhythm, S1 and S2 normal, no murmur, rub   or gallop, normal carotid impulse bilaterally without bruit.   Abdomen:     Soft, non-tender, bowel sounds active all four quadrants,     no masses, no organomegaly   Extremities:   Extremities normal, atraumatic, no cyanosis or edema   Pulses:   2+ and symmetric all extremities   Skin:   Skin color, texture, turgor normal, no rashes or lesions   Lymph nodes:   Cervical, supraclavicular, and axillary nodes normal   Neurologic:   normal strength, sensation intact     throughout       Diagnostic Data:    ECG 12 Lead    Date/Time: 7/10/2024 9:01 AM  Performed by: Alfredo Coppola III, MD    Authorized by: Alfredo Coppola III, MD  Comparison: compared with previous ECG from 9/12/2019  Similar to previous ECG  Rhythm: sinus rhythm  Ectopy: atrial premature contractions    Clinical impression: abnormal EKG        No results found for: \"CHLPL\", \"TRIG\", \"HDL\", \"LDLDIRECT\"  Lab Results   Component Value Date    GLUCOSE 149 (H) 09/27/2019    BUN 13 09/27/2019    CREATININE 0.96 09/27/2019     09/27/2019    " K 3.8 09/27/2019     09/27/2019    CO2 22.0 09/27/2019     Lab Results   Component Value Date    HGBA1C 5.50 09/12/2019     Lab Results   Component Value Date    WBC 11.18 (H) 09/27/2019    HGB 10.4 (L) 09/27/2019    HCT 33.1 (L) 09/27/2019     09/27/2019       PROBLEM LIST:  Patient Active Problem List   Diagnosis    Palpitations    Hypertension    Hyperlipidemia    Obesity (BMI 30.0-34.9)    Hypercholesterolemia    Depression    Knee osteoarthritis    Hypothyroid    Insomnia    Primary osteoarthritis of right knee    Status post total right knee replacement    Leukocytosis, mild, likely reactive    Acute blood loss anemia, mild, asymptomatic    Acute postoperative pain    Nontraumatic complete tear of right rotator cuff    Rotator cuff impingement syndrome, right    Impingement syndrome of right shoulder       PAST MEDICAL HX  Past Medical History:   Diagnosis Date    Acute pain of left knee     Depression     Hx of    Dyspnea     Edema     Frozen shoulder 2021    Hypercholesterolemia     Hyperlipidemia     Hypertension     Hypothyroid      10/14 TSH 5.320;  04/15 TSH 4.580    Insomnia     Knee osteoarthritis     RT. meniscal scope, Dr. Alvino Cifuentes, 01/15. removal of ganglion cyst 01/15    Obesity (BMI 30.0-34.9)     BMI 33.66    Palpitations     Periarthritis of shoulder 2020    Rotator cuff syndrome 2020    Urinary incontinence     Wears glasses     READING       Allergies  Allergies   Allergen Reactions    Benadryl [Diphenhydramine] Other (See Comments)     SLEEPY    Adhesive Tape Rash    Percocet [Oxycodone-Acetaminophen] Other (See Comments)     SEVERE ITCHING IN HEAD    Wound Dressing Adhesive Rash       Current Medications    Current Outpatient Medications:     aspirin 81 MG EC tablet, Take 1 tablet by mouth Daily. Resume in 1 month, Disp: , Rfl:     buPROPion XL (WELLBUTRIN XL) 300 MG 24 hr tablet, Take 1 tablet by mouth Daily., Disp: 90 tablet, Rfl: 0    estradiol (ESTRACE VAGINAL) 0.1 MG/GM  vaginal cream, Insert 1 gm intravaginally 2 times each week, Disp: 42.5 g, Rfl: 4    estradiol (ESTRACE) 0.5 MG tablet, Take 1 tablet by mouth Daily., Disp: 90 tablet, Rfl: 3    FLUoxetine (PROzac) 40 MG capsule, Take 1 capsule by mouth Daily., Disp: 90 capsule, Rfl: 0    losartan-hydrochlorothiazide (HYZAAR) 100-12.5 MG per tablet, , Disp: , Rfl:     rosuvastatin (CRESTOR) 10 MG tablet, Take 1 tablet by mouth Every Night., Disp: , Rfl: 0         ROS  ROS      SOCIAL HX  Social History     Socioeconomic History    Marital status:     Number of children: 1    Highest education level: Master's degree (e.g., MA, MS, Misty, MEd, MSW, SIMON)   Tobacco Use    Smoking status: Never     Passive exposure: Never    Smokeless tobacco: Never   Vaping Use    Vaping status: Never Used   Substance and Sexual Activity    Alcohol use: Never    Drug use: Never    Sexual activity: Yes     Partners: Male     Birth control/protection: Hysterectomy       FAMILY HX  Family History   Problem Relation Age of Onset    Heart attack Mother         Acute MI    Hypertension Mother     Stroke Mother     Depression Mother     Heart disease Mother     Osteoarthritis Mother     Osteoporosis Mother     Heart attack Father 63            Heart disease Father     Hypertension Father     Anxiety disorder Daughter     Depression Daughter     Depression Other     Heart disease Other     Hypertension Other     Breast cancer Neg Hx     Ovarian cancer Neg Hx              Alfredo Coppola III, MD, FACC

## 2024-08-09 ENCOUNTER — HOSPITAL ENCOUNTER (OUTPATIENT)
Dept: CARDIOLOGY | Facility: HOSPITAL | Age: 69
Discharge: HOME | End: 2024-08-09
Payer: MEDICARE

## 2024-08-09 ENCOUNTER — TELEPHONE (OUTPATIENT)
Dept: CARDIOLOGY | Facility: CLINIC | Age: 69
End: 2024-08-09
Payer: MEDICARE

## 2024-08-09 VITALS
HEART RATE: 68 BPM | SYSTOLIC BLOOD PRESSURE: 147 MMHG | DIASTOLIC BLOOD PRESSURE: 85 MMHG | OXYGEN SATURATION: 98 % | RESPIRATION RATE: 16 BRPM

## 2024-08-09 VITALS
HEIGHT: 65 IN | BODY MASS INDEX: 28.98 KG/M2 | WEIGHT: 173.94 LBS | WEIGHT: 173.94 LBS | BODY MASS INDEX: 28.98 KG/M2 | HEIGHT: 65 IN

## 2024-08-09 DIAGNOSIS — R07.2 PRECORDIAL PAIN: ICD-10-CM

## 2024-08-09 DIAGNOSIS — R00.2 PALPITATIONS: ICD-10-CM

## 2024-08-09 DIAGNOSIS — R00.2 PALPITATIONS: Primary | ICD-10-CM

## 2024-08-09 DIAGNOSIS — R42 DIZZINESS: ICD-10-CM

## 2024-08-09 LAB
BH CV ECHO MEAS - AO MAX PG: 4.6 MMHG
BH CV ECHO MEAS - AO MEAN PG: 2.33 MMHG
BH CV ECHO MEAS - AO ROOT DIAM: 2.6 CM
BH CV ECHO MEAS - AO V2 MAX: 107 CM/SEC
BH CV ECHO MEAS - AO V2 VTI: 26.1 CM
BH CV ECHO MEAS - AVA(I,D): 2.41 CM2
BH CV ECHO MEAS - EDV(CUBED): 74.1 ML
BH CV ECHO MEAS - EDV(MOD-SP2): 112 ML
BH CV ECHO MEAS - EDV(MOD-SP4): 96.7 ML
BH CV ECHO MEAS - EF(MOD-BP): 54.5 %
BH CV ECHO MEAS - EF(MOD-SP2): 55 %
BH CV ECHO MEAS - EF(MOD-SP4): 54.1 %
BH CV ECHO MEAS - ESV(CUBED): 18.5 ML
BH CV ECHO MEAS - ESV(MOD-SP2): 50.4 ML
BH CV ECHO MEAS - ESV(MOD-SP4): 44.4 ML
BH CV ECHO MEAS - FS: 37 %
BH CV ECHO MEAS - IVS/LVPW: 0.71 CM
BH CV ECHO MEAS - IVSD: 0.5 CM
BH CV ECHO MEAS - LA DIMENSION: 3.4 CM
BH CV ECHO MEAS - LAT PEAK E' VEL: 8.1 CM/SEC
BH CV ECHO MEAS - LV DIASTOLIC VOL/BSA (35-75): 51.9 CM2
BH CV ECHO MEAS - LV MASS(C)D: 70 GRAMS
BH CV ECHO MEAS - LV MAX PG: 3.3 MMHG
BH CV ECHO MEAS - LV MEAN PG: 2 MMHG
BH CV ECHO MEAS - LV SYSTOLIC VOL/BSA (12-30): 23.8 CM2
BH CV ECHO MEAS - LV V1 MAX: 90.4 CM/SEC
BH CV ECHO MEAS - LV V1 VTI: 21 CM
BH CV ECHO MEAS - LVIDD: 4.2 CM
BH CV ECHO MEAS - LVIDS: 2.6 CM
BH CV ECHO MEAS - LVOT AREA: 3 CM2
BH CV ECHO MEAS - LVOT DIAM: 1.95 CM
BH CV ECHO MEAS - LVPWD: 0.7 CM
BH CV ECHO MEAS - MED PEAK E' VEL: 8.9 CM/SEC
BH CV ECHO MEAS - MV A MAX VEL: 75.4 CM/SEC
BH CV ECHO MEAS - MV DEC SLOPE: 248.4 CM/SEC2
BH CV ECHO MEAS - MV DEC TIME: 0.19 SEC
BH CV ECHO MEAS - MV E MAX VEL: 79.7 CM/SEC
BH CV ECHO MEAS - MV E/A: 1.06
BH CV ECHO MEAS - MV MAX PG: 3.2 MMHG
BH CV ECHO MEAS - MV MEAN PG: 1.09 MMHG
BH CV ECHO MEAS - MV P1/2T: 75.8 MSEC
BH CV ECHO MEAS - MV V2 VTI: 21.2 CM
BH CV ECHO MEAS - MVA(P1/2T): 2.9 CM2
BH CV ECHO MEAS - MVA(VTI): 3 CM2
BH CV ECHO MEAS - PA ACC TIME: 0.16 SEC
BH CV ECHO MEAS - PA V2 MAX: 89.6 CM/SEC
BH CV ECHO MEAS - PI END-D VEL: 77.4 CM/SEC
BH CV ECHO MEAS - RAP SYSTOLE: 8 MMHG
BH CV ECHO MEAS - RVSP: 26 MMHG
BH CV ECHO MEAS - SV(LVOT): 62.9 ML
BH CV ECHO MEAS - SV(MOD-SP2): 61.6 ML
BH CV ECHO MEAS - SV(MOD-SP4): 52.3 ML
BH CV ECHO MEAS - SVI(LVOT): 33.7 ML/M2
BH CV ECHO MEAS - SVI(MOD-SP2): 33 ML/M2
BH CV ECHO MEAS - SVI(MOD-SP4): 28.1 ML/M2
BH CV ECHO MEAS - TAPSE (>1.6): 2.28 CM
BH CV ECHO MEAS - TR MAX PG: 18.3 MMHG
BH CV ECHO MEAS - TR MAX VEL: 213.9 CM/SEC
BH CV ECHO MEASUREMENTS AVERAGE E/E' RATIO: 9.38
BH CV REST NUCLEAR ISOTOPE DOSE: 9.6 MCI
BH CV STRESS BP STAGE 2: NORMAL
BH CV STRESS BP STAGE 4: NORMAL
BH CV STRESS COMMENTS STAGE 1: NORMAL
BH CV STRESS COMMENTS STAGE 2: NORMAL
BH CV STRESS DOSE REGADENOSON STAGE 1: 0.4
BH CV STRESS DURATION MIN STAGE 1: 1
BH CV STRESS DURATION MIN STAGE 2: 1
BH CV STRESS DURATION SEC STAGE 1: 0
BH CV STRESS DURATION SEC STAGE 2: 0
BH CV STRESS HR STAGE 1: 70
BH CV STRESS HR STAGE 2: 98
BH CV STRESS HR STAGE 3: 90
BH CV STRESS HR STAGE 4: 87
BH CV STRESS NUCLEAR ISOTOPE DOSE: 33 MCI
BH CV STRESS O2 STAGE 1: 97
BH CV STRESS O2 STAGE 2: 98
BH CV STRESS O2 STAGE 3: 99
BH CV STRESS O2 STAGE 4: 98
BH CV STRESS PROTOCOL 1: NORMAL
BH CV STRESS RECOVERY BP: NORMAL MMHG
BH CV STRESS RECOVERY HR: 80 BPM
BH CV STRESS RECOVERY O2: 97 %
BH CV STRESS STAGE 1: 1
BH CV STRESS STAGE 2: 2
BH CV STRESS STAGE 3: 3
BH CV STRESS STAGE 4: 4
BH CV XLRA - RV BASE: 3.5 CM
BH CV XLRA - RV LENGTH: 6.1 CM
BH CV XLRA - RV MID: 2.6 CM
BH CV XLRA - TDI S': 13.2 CM/SEC
BH CV XLRA MEAS LEFT DIST CCA EDV: 17.2 CM/SEC
BH CV XLRA MEAS LEFT DIST CCA PSV: 60.4 CM/SEC
BH CV XLRA MEAS LEFT DIST ICA EDV: 34 CM/SEC
BH CV XLRA MEAS LEFT DIST ICA PSV: 137 CM/SEC
BH CV XLRA MEAS LEFT ICA/CCA RATIO: 1.08
BH CV XLRA MEAS LEFT MID CCA EDV: 12.1 CM/SEC
BH CV XLRA MEAS LEFT MID CCA PSV: 74.1 CM/SEC
BH CV XLRA MEAS LEFT MID ICA EDV: 23 CM/SEC
BH CV XLRA MEAS LEFT MID ICA PSV: 80.1 CM/SEC
BH CV XLRA MEAS LEFT PROX CCA EDV: 17.6 CM/SEC
BH CV XLRA MEAS LEFT PROX CCA PSV: 93.8 CM/SEC
BH CV XLRA MEAS LEFT PROX ECA EDV: 5.5 CM/SEC
BH CV XLRA MEAS LEFT PROX ECA PSV: 65.9 CM/SEC
BH CV XLRA MEAS LEFT PROX ICA EDV: 11.3 CM/SEC
BH CV XLRA MEAS LEFT PROX ICA PSV: 43.9 CM/SEC
BH CV XLRA MEAS LEFT PROX SCLA PSV: 124.9 CM/SEC
BH CV XLRA MEAS LEFT VERTEBRAL A EDV: 9.8 CM/SEC
BH CV XLRA MEAS LEFT VERTEBRAL A PSV: 38.9 CM/SEC
BH CV XLRA MEAS RIGHT DIST CCA EDV: 18.2 CM/SEC
BH CV XLRA MEAS RIGHT DIST CCA PSV: 64.4 CM/SEC
BH CV XLRA MEAS RIGHT DIST ICA EDV: 23.1 CM/SEC
BH CV XLRA MEAS RIGHT DIST ICA PSV: 102 CM/SEC
BH CV XLRA MEAS RIGHT ICA/CCA RATIO: 0.95
BH CV XLRA MEAS RIGHT MID CCA EDV: 22.7 CM/SEC
BH CV XLRA MEAS RIGHT MID CCA PSV: 84.7 CM/SEC
BH CV XLRA MEAS RIGHT MID ICA EDV: 26.9 CM/SEC
BH CV XLRA MEAS RIGHT MID ICA PSV: 80.7 CM/SEC
BH CV XLRA MEAS RIGHT PROX CCA EDV: 25.1 CM/SEC
BH CV XLRA MEAS RIGHT PROX CCA PSV: 87.8 CM/SEC
BH CV XLRA MEAS RIGHT PROX ECA EDV: 8.1 CM/SEC
BH CV XLRA MEAS RIGHT PROX ECA PSV: 99.4 CM/SEC
BH CV XLRA MEAS RIGHT PROX ICA EDV: 16.2 CM/SEC
BH CV XLRA MEAS RIGHT PROX ICA PSV: 63.4 CM/SEC
BH CV XLRA MEAS RIGHT PROX SCLA PSV: 103.5 CM/SEC
BH CV XLRA MEAS RIGHT VERTEBRAL A EDV: 7.2 CM/SEC
BH CV XLRA MEAS RIGHT VERTEBRAL A PSV: 34.2 CM/SEC
LEFT ATRIUM VOLUME INDEX: 20.5 ML/M2
LV EF NUC BP: 65 %
MAXIMAL PREDICTED HEART RATE: 150 BPM
PERCENT MAX PREDICTED HR: 86.67 %
RIGHT ARM BP: NORMAL MMHG
STRESS BASELINE BP: NORMAL MMHG
STRESS BASELINE HR: 64 BPM
STRESS O2 SAT REST: 97 %
STRESS PERCENT HR: 102 %
STRESS POST ESTIMATED WORKLOAD: 1 METS
STRESS POST EXERCISE DUR MIN: 4 MIN
STRESS POST EXERCISE DUR SEC: 0 SEC
STRESS POST O2 SAT PEAK: 99 %
STRESS POST PEAK BP: NORMAL MMHG
STRESS POST PEAK HR: 130 BPM
STRESS TARGET HR: 128 BPM

## 2024-08-09 PROCEDURE — 93880 EXTRACRANIAL BILAT STUDY: CPT

## 2024-08-09 PROCEDURE — 78452 HT MUSCLE IMAGE SPECT MULT: CPT

## 2024-08-09 PROCEDURE — 93017 CV STRESS TEST TRACING ONLY: CPT

## 2024-08-09 PROCEDURE — 25010000002 REGADENOSON 0.4 MG/5ML SOLUTION: Performed by: INTERNAL MEDICINE

## 2024-08-09 PROCEDURE — 93306 TTE W/DOPPLER COMPLETE: CPT

## 2024-08-09 PROCEDURE — A9500 TC99M SESTAMIBI: HCPCS | Performed by: INTERNAL MEDICINE

## 2024-08-09 PROCEDURE — 0 TECHNETIUM SESTAMIBI: Performed by: INTERNAL MEDICINE

## 2024-08-09 RX ORDER — REGADENOSON 0.08 MG/ML
0.4 INJECTION, SOLUTION INTRAVENOUS ONCE
Status: COMPLETED | OUTPATIENT
Start: 2024-08-09 | End: 2024-08-09

## 2024-08-09 RX ADMIN — REGADENOSON 0.4 MG: 0.08 INJECTION, SOLUTION INTRAVENOUS at 11:05

## 2024-08-09 RX ADMIN — TECHNETIUM TC 99M SESTAMIBI 1 DOSE: 1 INJECTION INTRAVENOUS at 08:57

## 2024-08-09 RX ADMIN — TECHNETIUM TC 99M SESTAMIBI 1 DOSE: 1 INJECTION INTRAVENOUS at 11:05

## 2024-08-09 NOTE — TELEPHONE ENCOUNTER
----- Message from Alfredo Coppola sent at 8/9/2024  3:24 PM EDT -----  Normal heart function noted on echo, no evidence of blockage on stress test.  No significant blockages noted in your carotid arteries.  Overall reassuring evaluation.

## 2024-08-13 ENCOUNTER — TELEPHONE (OUTPATIENT)
Dept: CARDIOLOGY | Facility: CLINIC | Age: 69
End: 2024-08-13
Payer: MEDICARE

## 2024-08-13 NOTE — TELEPHONE ENCOUNTER
----- Message from Alfredo Coppola sent at 8/13/2024  3:51 PM EDT -----  No concerning heart rhythm issues noted on monitor.  Intermittently symptoms are due to extra heartbeats coming from the top chambers of the heart.  Will discuss further at follow-up.

## 2024-08-26 ENCOUNTER — TELEMEDICINE (OUTPATIENT)
Dept: PSYCHIATRY | Facility: CLINIC | Age: 69
End: 2024-08-26
Payer: MEDICARE

## 2024-08-26 DIAGNOSIS — F41.1 GENERALIZED ANXIETY DISORDER: Primary | ICD-10-CM

## 2024-08-26 DIAGNOSIS — E66.3 OVERWEIGHT WITH BODY MASS INDEX (BMI) OF 28 TO 28.9 IN ADULT: ICD-10-CM

## 2024-08-26 DIAGNOSIS — F33.0 MILD EPISODE OF RECURRENT MAJOR DEPRESSIVE DISORDER: ICD-10-CM

## 2024-08-26 PROCEDURE — 1160F RVW MEDS BY RX/DR IN RCRD: CPT | Performed by: PSYCHIATRY & NEUROLOGY

## 2024-08-26 PROCEDURE — 1159F MED LIST DOCD IN RCRD: CPT | Performed by: PSYCHIATRY & NEUROLOGY

## 2024-08-26 PROCEDURE — 99214 OFFICE O/P EST MOD 30 MIN: CPT | Performed by: PSYCHIATRY & NEUROLOGY

## 2024-08-26 NOTE — PROGRESS NOTES
Subjective   Germaine Jett is a 69 y.o. female who presents today for follow up    Chief Complaint:  Depression and Anxiety    This provider is located at [facility], [address] [place] [address]. The Patient is seen remotely at home, using Epic Mychart. Patient is being seen via telehealth and stated they are in a secure environment for this session. The patient’s condition being diagnosed/treated is appropriate for telemedicine. The provider identified himself as well as his credentials.   The patient gave consent to be seen remotely, and when consent is given they understand that the consent allows for patient identifiable information to be sent to a third party as needed.   They may refuse to be seen remotely at any time. The electronic data is encrypted and password protected, and the patient has been advised of the potential risks to privacy not withstanding such measures      History of Present Illness: The patient presented today for follow-up.  Since last visit, patient has been doing fairly well.  She states that she has been forcing herself to get up and do things during the day which has helped with her motivation and mood overall.  She has also been more reliably taking medications.  She is not having any side effects.  Sleep and appetite are stable.  She denies SI/HI/AVH.  She did recently go on vacation and came back with COVID but is recovering appropriately though she did have some extreme fatigue for a week to 2 after the initial illness.    The following portions of the patient's history were reviewed and updated as appropriate: allergies, current medications, past family history, past medical history, past social history, past surgical history and problem list.      Past Medical History:  Past Medical History:   Diagnosis Date    Acute pain of left knee     Depression     Hx of    Dyspnea     Edema     Frozen shoulder 2021    Hypercholesterolemia     Hyperlipidemia     Hypertension      Hypothyroid      10/14 TSH 5.320;  04/15 TSH 4.580    Insomnia     Knee osteoarthritis     RT. meniscal scope, Dr. Alvino Cifuentes, 01/15. removal of ganglion cyst 01/15    Obesity (BMI 30.0-34.9)     BMI 33.66    Palpitations     Periarthritis of shoulder     Rotator cuff syndrome     Urinary incontinence     Wears glasses     READING       Social History:  Social History     Socioeconomic History    Marital status:     Number of children: 1    Highest education level: Master's degree (e.g., MA, MS, Misty, MEd, MSW, SIMON)   Tobacco Use    Smoking status: Never     Passive exposure: Never    Smokeless tobacco: Never   Vaping Use    Vaping status: Never Used   Substance and Sexual Activity    Alcohol use: Never    Drug use: Never    Sexual activity: Yes     Partners: Male     Birth control/protection: Hysterectomy       Family History:  Family History   Problem Relation Age of Onset    Heart attack Mother         Acute MI    Hypertension Mother     Stroke Mother     Depression Mother     Heart disease Mother     Osteoarthritis Mother     Osteoporosis Mother     Heart attack Father 63            Heart disease Father     Hypertension Father     Anxiety disorder Daughter     Depression Daughter     Depression Other     Heart disease Other     Hypertension Other     Breast cancer Neg Hx     Ovarian cancer Neg Hx        Past Surgical History:  Past Surgical History:   Procedure Laterality Date    APPENDECTOMY      BLADDER SURGERY      BREAST BIOPSY Left     CARDIAC CATHETERIZATION       SECTION      COLONOSCOPY  2017    GANGLION CYST EXCISION  2015    R KNEE    HYSTERECTOMY      INCISIONAL BREAST BIOPSY      JOINT REPLACEMENT      KNEE ARTHROSCOPY Right     OOPHORECTOMY      TONSILLECTOMY AND ADENOIDECTOMY      TOTAL ABDOMINAL HYSTERECTOMY WITH SALPINGO OOPHORECTOMY      TOTAL KNEE ARTHROPLASTY Right 2019    Procedure: TOTAL KNEE ARTHROPLASTY RIGHT;  Surgeon: Medardo Cosme MD;   Location: [facility] OR;  Service: Orthopedics       Problem List:  Patient Active Problem List   Diagnosis    Palpitations    Hypertension    Hyperlipidemia    Obesity (BMI 30.0-34.9)    Hypercholesterolemia    Depression    Knee osteoarthritis    Hypothyroid    Insomnia    Primary osteoarthritis of right knee    Status post total right knee replacement    Leukocytosis, mild, likely reactive    Acute blood loss anemia, mild, asymptomatic    Acute postoperative pain    Nontraumatic complete tear of right rotator cuff    Rotator cuff impingement syndrome, right    Impingement syndrome of right shoulder       Allergy:   Allergies   Allergen Reactions    Benadryl [Diphenhydramine] Other (See Comments)     SLEEPY    Hydrocodone Itching    Percocet [Oxycodone-Acetaminophen] Other (See Comments)     SEVERE ITCHING IN HEAD        Current Medications:   Current Outpatient Medications   Medication Sig Dispense Refill    aspirin 81 MG EC tablet Take 1 tablet by mouth Daily. Resume in 1 month      buPROPion XL (WELLBUTRIN XL) 300 MG 24 hr tablet Take 1 tablet by mouth Daily. 90 tablet 0    estradiol (ESTRACE VAGINAL) 0.1 MG/GM vaginal cream Insert 1 gm intravaginally 2 times each week 42.5 g 4    estradiol (ESTRACE) 0.5 MG tablet Take 1 tablet by mouth Daily. 90 tablet 3    FLUoxetine (PROzac) 40 MG capsule Take 1 capsule by mouth Daily. 90 capsule 0    losartan-hydrochlorothiazide (HYZAAR) 100-12.5 MG per tablet       rosuvastatin (CRESTOR) 10 MG tablet Take 1 tablet by mouth Every Night.  0     No current facility-administered medications for this visit.       Review of Symptoms:    Review of Systems   Constitutional:  Positive for fatigue. Negative for activity change (improved).   HENT:  Negative for congestion, rhinorrhea and sore throat.    Respiratory:  Negative for cough.    Cardiovascular: Negative.    Gastrointestinal: Negative.    Neurological:  Negative for headache.   Psychiatric/Behavioral:  Negative for agitation,  behavioral problems, dysphoric mood, sleep disturbance and stress. The patient is not nervous/anxious.          Physical Exam:   not currently breastfeeding.  Video visit    Appearance:  female stated age in no acute distress  Gait, Station, Strength: Video visit    Mental Status Exam:     Mental Status exam performed 08/26/2024  and patient shows no significant changes from previous exam.     Hygiene:   good  Cooperation:  Cooperative  Eye Contact:  Good  Psychomotor Behavior:  Appropriate  Affect:  Full range  Mood: normal  Hopelessness: Denies  Speech:  Normal  Thought Process:  Goal directed and Linear  Thought Content:  Normal and Mood congruent  Suicidal:  None  Homicidal:  None  Hallucinations:  None  Delusion:  None  Memory:  Intact  Orientation:  Person, Place, Time and Situation  Reliability:  good  Insight:  Good  Judgement:  Good  Impulse Control:  Good  Physical/Medical Issues:  No        Lab Results:   Hospital Outpatient Visit on 08/09/2024   Component Date Value Ref Range Status    Prox CCA PSV 08/09/2024 87.8  cm/sec Final    Prox CCA EDV 08/09/2024 25.1  cm/sec Final    Right Mid CCA PSV 08/09/2024 84.7  cm/sec Final    right Mid CCA EDV 08/09/2024 22.7  cm/sec Final    Dist CCA PSV 08/09/2024 64.4  cm/sec Final    Dist CCA EDV 08/09/2024 18.2  cm/sec Final    Prox ICA PSV 08/09/2024 63.4  cm/sec Final    Prox ICA EDV 08/09/2024 16.2  cm/sec Final    Mid ICA PSV 08/09/2024 80.7  cm/sec Final    Mid ICA EDV 08/09/2024 26.9  cm/sec Final    Dist ICA PSV 08/09/2024 102.0  cm/sec Final    Dist ICA EDV 08/09/2024 23.1  cm/sec Final    Prox ECA PSV 08/09/2024 99.4  cm/sec Final    Prox ECA EDV 08/09/2024 8.1  cm/sec Final    Vertebral A PSV 08/09/2024 34.2  cm/sec Final    Vertebral A EDV 08/09/2024 7.2  cm/sec Final    Prox SCLA PSV 08/09/2024 103.5  cm/sec Final    Prox CCA PSV 08/09/2024 93.8  cm/sec Final    Prox CCA EDV 08/09/2024 17.6  cm/sec Final    left Mid CCA PSV 08/09/2024 74.1   cm/sec Final    left Mid CCA EDV 08/09/2024 12.1  cm/sec Final    Dist CCA PSV 08/09/2024 60.4  cm/sec Final    Dist CCA EDV 08/09/2024 17.2  cm/sec Final    Prox ICA PSV 08/09/2024 43.9  cm/sec Final    Prox ICA EDV 08/09/2024 11.3  cm/sec Final    Mid ICA PSV 08/09/2024 80.1  cm/sec Final    Mid ICA EDV 08/09/2024 23.0  cm/sec Final    Dist ICA PSV 08/09/2024 137.0  cm/sec Final    Dist ICA EDV 08/09/2024 34.0  cm/sec Final    Prox ECA PSV 08/09/2024 65.9  cm/sec Final    Prox ECA EDV 08/09/2024 5.5  cm/sec Final    Vertebral A PSV 08/09/2024 38.9  cm/sec Final    Vertebral A EDV 08/09/2024 9.8  cm/sec Final    Prox SCLA PSV 08/09/2024 124.9  cm/sec Final    Right arm BP 08/09/2024 122/78  mmHg Final    ICA/CCA ratio 08/09/2024 0.95   Final    ICA/CCA ratio 08/09/2024 1.08   Final   Hospital Outpatient Visit on 08/09/2024   Component Date Value Ref Range Status    EF(MOD-bp) 08/09/2024 54.5  % Final    LVIDd 08/09/2024 4.2  cm Final    LVIDs 08/09/2024 2.6  cm Final    IVSd 08/09/2024 0.50  cm Final    LVPWd 08/09/2024 0.70  cm Final    FS 08/09/2024 37.0  % Final    IVS/LVPW 08/09/2024 0.71  cm Final    ESV(cubed) 08/09/2024 18.5  ml Final    LV Sys Vol (BSA corrected) 08/09/2024 23.8  cm2 Final    EDV(cubed) 08/09/2024 74.1  ml Final    LV Pierce Vol (BSA corrected) 08/09/2024 51.9  cm2 Final    LV mass(C)d 08/09/2024 70.0  grams Final    LVOT area 08/09/2024 3.0  cm2 Final    LVOT diam 08/09/2024 1.95  cm Final    EDV(MOD-sp2) 08/09/2024 112.0  ml Final    EDV(MOD-sp4) 08/09/2024 96.7  ml Final    ESV(MOD-sp2) 08/09/2024 50.4  ml Final    ESV(MOD-sp4) 08/09/2024 44.4  ml Final    SV(MOD-sp2) 08/09/2024 61.6  ml Final    SV(MOD-sp4) 08/09/2024 52.3  ml Final    SVi(MOD-SP2) 08/09/2024 33.0  ml/m2 Final    SVi(MOD-SP4) 08/09/2024 28.1  ml/m2 Final    SVi (LVOT) 08/09/2024 33.7  ml/m2 Final    EF(MOD-sp2) 08/09/2024 55.0  % Final    EF(MOD-sp4) 08/09/2024 54.1  % Final    MV E max deana 08/09/2024 79.7  cm/sec Final     MV A max ena 08/09/2024 75.4  cm/sec Final    MV dec time 08/09/2024 0.19  sec Final    MV E/A 08/09/2024 1.06   Final    LA ESV Index (BP) 08/09/2024 20.5  ml/m2 Final    Med Peak E' Ean 08/09/2024 8.9  cm/sec Final    Lat Peak E' Ean 08/09/2024 8.1  cm/sec Final    TR max ean 08/09/2024 213.9  cm/sec Final    Avg E/e' ratio 08/09/2024 9.38   Final    SV(LVOT) 08/09/2024 62.9  ml Final    RV Base 08/09/2024 3.5  cm Final    RV Mid 08/09/2024 2.6  cm Final    RV Length 08/09/2024 6.1  cm Final    TAPSE (>1.6) 08/09/2024 2.28  cm Final    RV S' 08/09/2024 13.2  cm/sec Final    LA dimension (2D)  08/09/2024 3.4  cm Final    LV V1 max 08/09/2024 90.4  cm/sec Final    LV V1 max PG 08/09/2024 3.3  mmHg Final    LV V1 mean PG 08/09/2024 2.00  mmHg Final    LV V1 VTI 08/09/2024 21.0  cm Final    Ao pk ean 08/09/2024 107.0  cm/sec Final    Ao max PG 08/09/2024 4.6  mmHg Final    Ao mean PG 08/09/2024 2.33  mmHg Final    Ao V2 VTI 08/09/2024 26.1  cm Final    LACY(I,D) 08/09/2024 2.41  cm2 Final    MV max PG 08/09/2024 3.2  mmHg Final    MV mean PG 08/09/2024 1.09  mmHg Final    MV V2 VTI 08/09/2024 21.2  cm Final    MV P1/2t 08/09/2024 75.8  msec Final    MVA(P1/2t) 08/09/2024 2.9  cm2 Final    MVA(VTI) 08/09/2024 3.0  cm2 Final    MV dec slope 08/09/2024 248.4  cm/sec2 Final    TR max PG 08/09/2024 18.3  mmHg Final    PA V2 max 08/09/2024 89.6  cm/sec Final    PA acc time 08/09/2024 0.16  sec Final    PI end-d ena 08/09/2024 77.4  cm/sec Final    Ao root diam 08/09/2024 2.6  cm Final    RAP systole 08/09/2024 8  mmHg Final    RVSP(TR) 08/09/2024 26  mmHg Final   Hospital Outpatient Visit on 08/09/2024   Component Date Value Ref Range Status     CV STRESS PROTOCOL 1 08/09/2024 Pharmacologic   Final    Stage 1 08/09/2024 1.0   Final    Duration Min Stage 1 08/09/2024 1   Final    Duration Sec Stage 1 08/09/2024 0   Final    Stress Dose Regadenoson Stage 1 08/09/2024 0.40   Final    Stress Comments Stage 1 08/09/2024 10  sec bolus injection   Final    Stage 2 08/09/2024 2.0   Final    Duration Min Stage 2 08/09/2024 1   Final    Duration Sec Stage 2 08/09/2024 0   Final    Stress Comments Stage 2 08/09/2024 recovery   Final    Stage 3 08/09/2024 3.0   Final    Stage 4 08/09/2024 4.0   Final    Baseline HR 08/09/2024 64  bpm Final    Baseline BP 08/09/2024 147/85  mmHg Final    O2 sat rest 08/09/2024 97  % Final    Target HR (85%) 08/09/2024 128  bpm Final    Max. Pred. HR (100%) 08/09/2024 150  bpm Final    BH CV REST NUCLEAR ISOTOPE DOSE 08/09/2024 9.6  mCi Final    BH CV STRESS NUCLEAR ISOTOPE DOSE 08/09/2024 33.0  mCi Final    Nuc Stress EF 08/09/2024 65  % Final    HR Stage 1 08/09/2024 70   Final    O2 Stage 1 08/09/2024 97   Final    HR Stage 2 08/09/2024 98   Final    BP Stage 2 08/09/2024 139/69   Final    O2 Stage 2 08/09/2024 98   Final    HR Stage 3 08/09/2024 90   Final    O2 Stage 3 08/09/2024 99   Final    HR Stage 4 08/09/2024 87   Final    BP Stage 4 08/09/2024 144/75   Final    O2 Stage 4 08/09/2024 98   Final    Peak HR 08/09/2024 130  bpm Final    Peak BP 08/09/2024 144/75  mmHg Final    O2 sat peak 08/09/2024 99  % Final    Recovery HR 08/09/2024 80  bpm Final    Recovery BP 08/09/2024 167/76  mmHg Final    Recovery O2 08/09/2024 97  % Final    Percent Max Pred HR 08/09/2024 86.67  % Final    Exercise duration (min) 08/09/2024 4  min Final    Exercise duration (sec) 08/09/2024 0  sec Final    Estimated workload 08/09/2024 1.0  METS Final    Percent Target HR 08/09/2024 102  % Final       Assessment & Plan   Diagnoses and all orders for this visit:    1. Generalized anxiety disorder (Primary)    2. Mild episode of recurrent major depressive disorder    3. Overweight with body mass index (BMI) of 28 to 28.9 in adult      -Patient doing very well.  No major issues noted today.  She has lost some weight.  -Reviewed previous available documentation  -Reviewed most recent available labs   -Continue Prozac 40 mg p.o.  daily for mood and anxiety  -Continue Wellbutrin  mg p.o. daily for mood and anxiety as well as weight  -Obesity complicates all aspects of care  -Encouraged her continued healthy eating practices and physical activity as she has had some successful weight loss  -Encouraged to consider therapy.  -Approximate appointment time 9:30 AM to 9:45 AM via video visit      Visit Diagnoses:    ICD-10-CM ICD-9-CM   1. Generalized anxiety disorder  F41.1 300.02   2. Mild episode of recurrent major depressive disorder  F33.0 296.31   3. Overweight with body mass index (BMI) of 28 to 28.9 in adult  E66.3 278.02    Z68.28 V85.24         TREATMENT PLAN/GOALS: Continue supportive psychotherapy efforts and medications as indicated. Treatment and medication options discussed during today's visit. Patient acknowledged and verbally consented to continue with current treatment plan and was educated on the importance of compliance with treatment and follow-up appointments.    MEDICATION ISSUES:    Discussed medication options and treatment plan of prescribed medication as well as the risks, benefits, and side effects including potential falls, possible impaired driving and metabolic adversities among others. Patient is agreeable to call the office with any worsening of symptoms or onset of side effects. Patient is agreeable to call 911 or go to the nearest ER should he/she begin having SI/HI.     MEDS ORDERED DURING VISIT:  No orders of the defined types were placed in this encounter.      Return in about 3 months (around 11/26/2024).             This document has been electronically signed by Sim Knight MD  August 26, 2024 09:20 EDT

## 2024-10-10 ENCOUNTER — OFFICE VISIT (OUTPATIENT)
Dept: ORTHOPEDIC SURGERY | Facility: CLINIC | Age: 69
End: 2024-10-10
Payer: MEDICARE

## 2024-10-10 DIAGNOSIS — M75.121 NONTRAUMATIC COMPLETE TEAR OF RIGHT ROTATOR CUFF: Primary | ICD-10-CM

## 2024-10-10 RX ORDER — LIDOCAINE HYDROCHLORIDE 10 MG/ML
5 INJECTION, SOLUTION EPIDURAL; INFILTRATION; INTRACAUDAL; PERINEURAL
Status: COMPLETED | OUTPATIENT
Start: 2024-10-10 | End: 2024-10-10

## 2024-10-10 RX ORDER — TRIAMCINOLONE ACETONIDE 40 MG/ML
40 INJECTION, SUSPENSION INTRA-ARTICULAR; INTRAMUSCULAR
Status: COMPLETED | OUTPATIENT
Start: 2024-10-10 | End: 2024-10-10

## 2024-10-10 RX ORDER — ONDANSETRON 8 MG/1
TABLET, FILM COATED ORAL
COMMUNITY

## 2024-10-10 RX ORDER — BUPROPION HYDROCHLORIDE 150 MG/1
150 TABLET ORAL DAILY
COMMUNITY
Start: 2024-09-02 | End: 2025-09-02

## 2024-10-10 RX ORDER — LORATADINE 10 MG/1
TABLET ORAL
COMMUNITY

## 2024-10-10 RX ADMIN — TRIAMCINOLONE ACETONIDE 40 MG: 40 INJECTION, SUSPENSION INTRA-ARTICULAR; INTRAMUSCULAR at 09:48

## 2024-10-10 RX ADMIN — LIDOCAINE HYDROCHLORIDE 5 ML: 10 INJECTION, SOLUTION EPIDURAL; INFILTRATION; INTRACAUDAL; PERINEURAL at 09:48

## 2024-10-10 NOTE — PROGRESS NOTES
Procedure   - Large Joint Arthrocentesis: R subacromial bursa on 10/10/2024 9:48 AM  Indications: pain  Details: 21 G needle, posterior approach  Medications: 5 mL lidocaine PF 1% 1 %; 40 mg triamcinolone acetonide 40 MG/ML  Outcome: tolerated well, no immediate complications  Procedure, treatment alternatives, risks and benefits explained, specific risks discussed. Consent was given by the patient. Immediately prior to procedure a time out was called to verify the correct patient, procedure, equipment, support staff and site/side marked as required. Patient was prepped and draped in the usual sterile fashion.          Desires repeat subacromial injection.  Prior was 6/28/2024.  Desires to stick with conservative course.

## 2024-10-30 ENCOUNTER — TRANSCRIBE ORDERS (OUTPATIENT)
Dept: ADMINISTRATIVE | Facility: HOSPITAL | Age: 69
End: 2024-10-30
Payer: MEDICARE

## 2024-10-30 DIAGNOSIS — Z12.31 SCREENING MAMMOGRAM FOR BREAST CANCER: Primary | ICD-10-CM

## 2024-11-06 ENCOUNTER — PATIENT ROUNDING (BHMG ONLY) (OUTPATIENT)
Dept: CARDIOLOGY | Facility: CLINIC | Age: 69
End: 2024-11-06
Payer: MEDICARE

## 2024-11-06 ENCOUNTER — OFFICE VISIT (OUTPATIENT)
Dept: CARDIOLOGY | Facility: CLINIC | Age: 69
End: 2024-11-06
Payer: MEDICARE

## 2024-11-06 VITALS
DIASTOLIC BLOOD PRESSURE: 82 MMHG | HEIGHT: 65 IN | HEART RATE: 77 BPM | OXYGEN SATURATION: 100 % | WEIGHT: 180.6 LBS | BODY MASS INDEX: 30.09 KG/M2 | SYSTOLIC BLOOD PRESSURE: 110 MMHG

## 2024-11-06 DIAGNOSIS — I10 PRIMARY HYPERTENSION: Chronic | ICD-10-CM

## 2024-11-06 DIAGNOSIS — I49.1 PREMATURE ATRIAL COMPLEXES: Primary | ICD-10-CM

## 2024-11-06 RX ORDER — METOPROLOL SUCCINATE 25 MG/1
25 TABLET, EXTENDED RELEASE ORAL DAILY
Qty: 90 TABLET | Refills: 3 | Status: SHIPPED | OUTPATIENT
Start: 2024-11-06

## 2024-11-06 NOTE — PROGRESS NOTES
[facility] Cardiology  Office visit  Germaine Jett  1955  [phone]  There is no work phone number on file.    VISIT DATE:  11/6/2024    PCP: Susi Jett APRN  [address] [facility] [place] [address]    CC:  Chief Complaint   Patient presents with    Palpitations       Previous cardiac studies and procedures:  August 2024  Holter, 2-week    Frequent PACs, 21%.  Intermittently symptomatic.    Symptoms correlate with sinus rhythm and sinus rhythm with premature atrial contractions.  Carotid duplex: Normal  TTE: Normal  MPI: Normal    ASSESSMENT:   Diagnosis Plan   1. Premature atrial complexes        2. Primary hypertension            PLAN:  Premature atrial contractions: Frequent.  Intermittently symptomatic with lightheadedness.  No underlying structural, functional or ischemic heart disease.  Unremarkable laboratory evaluation.  Trial of low-dose beta-blockade, metoprolol succinate 25 mg p.o. nightly.  May consider antiarrhythmic therapy if symptoms worsen or persist.    Hypertension: Goal less than 130/80 mmHg.  Currently well-controlled.  Continue current medical therapy.    Subjective  Interval assessment: Denies chest pain.  Intermittent swimmy headed sensation which is transient.  No obvious triggers.  Compliant with medical therapy.  Has not been checking home blood pressures.    Initial evaluation:69-year-old female with history of hypertension dyslipidemia, family history of coronary disease.  Presenting with palpitations, chest discomfort and intermittent lightheadedness.  Describes episodes of left arm heaviness and tingling.  No obvious triggers but does appear to happen more commonly when she is active throughout the day.  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  No other associated symptoms.  Intermittent sharp precordial chest discomfort which is transient in nature.  More recently she has had episodes in which she suddenly feels an unusual sensation in her head,  "almost like transient lightheadedness.  No obvious triggers.  Occurring over the previous 4 to 6 weeks.  Has had history of intermittent brief tachypalpitations.  Recent twelve-lead EKGs have revealed sinus rhythm with premature atrial contractions.    PHYSICAL EXAMINATION:  Vitals:    11/06/24 1017   BP: 110/82   BP Location: Left arm   Patient Position: Sitting   Cuff Size: Adult   Pulse: 77   SpO2: 100%   Weight: 81.9 kg (180 lb 9.6 oz)   Height: 165.1 cm (65\")     General Appearance:    Alert, cooperative, no distress, appears stated age   Head:    Normocephalic, without obvious abnormality, atraumatic   Eyes:    conjunctiva/corneas clear   Nose:   Nares normal, septum midline, mucosa normal, no drainage   Throat:   Lips, teeth and gums normal   Neck:   Supple, symmetrical, trachea midline, no carotid    bruit or JVD   Lungs:     Clear to auscultation bilaterally, respirations unlabored   Chest Wall:    No tenderness or deformity    Heart:    Regular rate and rhythm, S1 and S2 normal, no murmur, rub   or gallop, normal carotid impulse bilaterally without bruit.   Abdomen:     Soft, non-tender   Extremities:   Extremities normal, atraumatic, no cyanosis or edema   Pulses:   2+ and symmetric all extremities   Skin:   Skin color, texture, turgor normal, no rashes or lesions       Diagnostic Data:  Procedures  No results found for: \"CHLPL\", \"TRIG\", \"HDL\", \"LDLDIRECT\"  Lab Results   Component Value Date    GLUCOSE 149 (H) 09/27/2019    BUN 13 09/27/2019    CREATININE 0.96 09/27/2019     09/27/2019    K 3.8 09/27/2019     09/27/2019    CO2 22.0 09/27/2019     Lab Results   Component Value Date    HGBA1C 5.50 09/12/2019     Lab Results   Component Value Date    WBC 11.18 (H) 09/27/2019    HGB 10.4 (L) 09/27/2019    HCT 33.1 (L) 09/27/2019     09/27/2019       Allergies  Allergies   Allergen Reactions    Benadryl [Diphenhydramine] Other (See Comments)     SLEEPY    Hydrocodone Itching    Percocet " [Oxycodone-Acetaminophen] Other (See Comments)     SEVERE ITCHING IN HEAD       Current Medications    Current Outpatient Medications:     aspirin 81 MG EC tablet, Take 1 tablet by mouth Daily. Resume in 1 month, Disp: , Rfl:     buPROPion XL (WELLBUTRIN XL) 150 MG 24 hr tablet, Take 1 tablet by mouth Daily., Disp: , Rfl:     estradiol (ESTRACE) 0.5 MG tablet, Take 1 tablet by mouth Daily., Disp: 90 tablet, Rfl: 3    FLUoxetine (PROzac) 40 MG capsule, Take 1 capsule by mouth Daily., Disp: 90 capsule, Rfl: 0    losartan-hydrochlorothiazide (HYZAAR) 100-12.5 MG per tablet, , Disp: , Rfl:     rosuvastatin (CRESTOR) 10 MG tablet, Take 1 tablet by mouth Every Night., Disp: , Rfl: 0    ascorbic acid (VITAMIN C) 250 MG tablet, Take 2 tablets by mouth Daily., Disp: , Rfl:     loratadine (CLARITIN) 10 MG tablet, , Disp: , Rfl:     metoprolol succinate XL (TOPROL-XL) 25 MG 24 hr tablet, Take 1 tablet by mouth Daily., Disp: 90 tablet, Rfl: 3    ondansetron (ZOFRAN) 8 MG tablet, , Disp: , Rfl:           ROS  ROS      SOCIAL HX  Social History     Socioeconomic History    Marital status:     Number of children: 1    Highest education level: Master's degree (e.g., MA, MS, Misty, MEd, MSW, SIMON)   Tobacco Use    Smoking status: Never     Passive exposure: Never    Smokeless tobacco: Never   Vaping Use    Vaping status: Never Used   Substance and Sexual Activity    Alcohol use: Never    Drug use: Never    Sexual activity: Yes     Partners: Male     Birth control/protection: Hysterectomy       FAMILY HX  Family History   Problem Relation Age of Onset    Heart attack Mother         Acute MI    Hypertension Mother     Stroke Mother     Depression Mother     Heart disease Mother     Osteoarthritis Mother     Osteoporosis Mother     Heart attack Father 63            Heart disease Father     Hypertension Father     Anxiety disorder Daughter     Depression Daughter     Depression Other     Heart disease Other     Hypertension  Other     Breast cancer Neg Hx     Ovarian cancer Neg Hx              Alfredo Coppola III, MD, FACC

## 2024-11-06 NOTE — PROGRESS NOTES
November 6, 2024    Hello, may I speak with Germaine Jett?    My name is Zainab Chance      I am  with [facility] CARDIOLOGY  [address]  [address]  [facility] [address] [phone].    Before we get started may I verify your date of birth? 1955    Tell me about your visit with us. What things went well?  Everything went smoothly. Everyone was very friendly. Dr. Coppola is great.       We're always looking for ways to make our patients' experiences even better. Do you have recommendations on ways we may improve?  yes. Better parking.    Overall were you satisfied with your first visit to our practice? yes       I appreciate you taking the time to speak with me today. Is there anything else I can do for you? no      Thank you, and have a great day.

## 2024-12-03 ENCOUNTER — TELEMEDICINE (OUTPATIENT)
Dept: PSYCHIATRY | Facility: CLINIC | Age: 69
End: 2024-12-03
Payer: MEDICARE

## 2024-12-03 DIAGNOSIS — E66.09 CLASS 1 OBESITY DUE TO EXCESS CALORIES WITHOUT SERIOUS COMORBIDITY WITH BODY MASS INDEX (BMI) OF 30.0 TO 30.9 IN ADULT: ICD-10-CM

## 2024-12-03 DIAGNOSIS — F33.41 RECURRENT MAJOR DEPRESSIVE DISORDER, IN PARTIAL REMISSION: ICD-10-CM

## 2024-12-03 DIAGNOSIS — E66.811 CLASS 1 OBESITY DUE TO EXCESS CALORIES WITHOUT SERIOUS COMORBIDITY WITH BODY MASS INDEX (BMI) OF 30.0 TO 30.9 IN ADULT: ICD-10-CM

## 2024-12-03 DIAGNOSIS — F41.1 GENERALIZED ANXIETY DISORDER: Primary | ICD-10-CM

## 2024-12-03 PROCEDURE — 1160F RVW MEDS BY RX/DR IN RCRD: CPT | Performed by: PSYCHIATRY & NEUROLOGY

## 2024-12-03 PROCEDURE — 1159F MED LIST DOCD IN RCRD: CPT | Performed by: PSYCHIATRY & NEUROLOGY

## 2024-12-03 PROCEDURE — 99214 OFFICE O/P EST MOD 30 MIN: CPT | Performed by: PSYCHIATRY & NEUROLOGY

## 2024-12-03 RX ORDER — BUPROPION HYDROCHLORIDE 150 MG/1
150 TABLET ORAL DAILY
Qty: 30 TABLET | Refills: 11 | Status: SHIPPED | OUTPATIENT
Start: 2024-12-03 | End: 2025-12-03

## 2024-12-03 RX ORDER — FLUOXETINE 40 MG/1
40 CAPSULE ORAL DAILY
Qty: 90 CAPSULE | Refills: 0 | Status: SHIPPED | OUTPATIENT
Start: 2024-12-03

## 2024-12-03 NOTE — PROGRESS NOTES
Subjective   Germaine Jett is a 69 y.o. female who presents today for follow up    Chief Complaint:  Depression and Anxiety    This provider is located at [facility], [address] [place] [address]. The Patient is seen remotely at home, using Epic Mychart. Patient is being seen via telehealth and stated they are in a secure environment for this session. The patient’s condition being diagnosed/treated is appropriate for telemedicine. The provider identified himself as well as his credentials.   The patient gave consent to be seen remotely, and when consent is given they understand that the consent allows for patient identifiable information to be sent to a third party as needed.   They may refuse to be seen remotely at any time. The electronic data is encrypted and password protected, and the patient has been advised of the potential risks to privacy not withstanding such measures      History of Present Illness: Patient presented today for follow-up.  Since last visit, patient has been doing fairly well from a mental health standpoint.  She has been better about taking her medications regularly and is functioning appropriately with minimal mood or anxiety symptoms.  She is not having medication side effects.  She denies SI/HI/AVH.  She did have scurvy since last visit and is currently sick with mono.      The following portions of the patient's history were reviewed and updated as appropriate: allergies, current medications, past family history, past medical history, past social history, past surgical history and problem list.      Past Medical History:  Past Medical History:   Diagnosis Date    Abnormal ECG     Acute pain of left knee     Depression     Hx of    Dyspnea     Edema     Frozen shoulder 2021    Hypercholesterolemia     Hyperlipidemia     Hypertension     Hypothyroid      10/14 TSH 5.320;  04/15 TSH 4.580    Insomnia     Knee osteoarthritis     RT. meniscal scope, Dr. Alvino Cifuentes, 01/15.  removal of ganglion cyst 01/15    Obesity (BMI 30.0-34.9)     BMI 33.66    Palpitations     Periarthritis of shoulder     Rotator cuff syndrome     Urinary incontinence     Wears glasses     READING       Social History:  Social History     Socioeconomic History    Marital status:     Number of children: 1    Highest education level: Master's degree (e.g., MA, MS, Misty, MEd, MSW, SIMON)   Tobacco Use    Smoking status: Never     Passive exposure: Never    Smokeless tobacco: Never   Vaping Use    Vaping status: Never Used   Substance and Sexual Activity    Alcohol use: Never    Drug use: Never    Sexual activity: Yes     Partners: Male     Birth control/protection: Hysterectomy       Family History:  Family History   Problem Relation Age of Onset    Heart attack Mother         Acute MI    Hypertension Mother     Stroke Mother     Depression Mother     Heart disease Mother     Osteoarthritis Mother     Osteoporosis Mother     Heart attack Father 63            Heart disease Father     Hypertension Father     Anxiety disorder Daughter     Depression Daughter     Depression Other     Heart disease Other     Hypertension Other     Breast cancer Neg Hx     Ovarian cancer Neg Hx        Past Surgical History:  Past Surgical History:   Procedure Laterality Date    APPENDECTOMY      BLADDER SURGERY      BREAST BIOPSY Left     CARDIAC CATHETERIZATION       SECTION      COLONOSCOPY  2017    GANGLION CYST EXCISION  2015    R KNEE    HYSTERECTOMY      INCISIONAL BREAST BIOPSY      JOINT REPLACEMENT      KNEE ARTHROSCOPY Right     OOPHORECTOMY      TONSILLECTOMY AND ADENOIDECTOMY      TOTAL ABDOMINAL HYSTERECTOMY WITH SALPINGO OOPHORECTOMY      TOTAL KNEE ARTHROPLASTY Right 2019    Procedure: TOTAL KNEE ARTHROPLASTY RIGHT;  Surgeon: Medardo Cosme MD;  Location: [facility];  Service: Orthopedics       Problem List:  Patient Active Problem List   Diagnosis    Palpitations    Hypertension     Hyperlipidemia    Obesity (BMI 30.0-34.9)    Hypercholesterolemia    Depression    Knee osteoarthritis    Hypothyroid    Insomnia    Primary osteoarthritis of right knee    Status post total right knee replacement    Leukocytosis, mild, likely reactive    Acute blood loss anemia, mild, asymptomatic    Acute postoperative pain    Nontraumatic complete tear of right rotator cuff    Rotator cuff impingement syndrome, right    Impingement syndrome of right shoulder    Premature atrial complexes       Allergy:   Allergies   Allergen Reactions    Benadryl [Diphenhydramine] Other (See Comments)     SLEEPY    Hydrocodone Itching    Percocet [Oxycodone-Acetaminophen] Other (See Comments)     SEVERE ITCHING IN HEAD        Current Medications:   Current Outpatient Medications   Medication Sig Dispense Refill    ascorbic acid (VITAMIN C) 250 MG tablet Take 2 tablets by mouth Daily.      aspirin 81 MG EC tablet Take 1 tablet by mouth Daily. Resume in 1 month      buPROPion XL (WELLBUTRIN XL) 150 MG 24 hr tablet Take 1 tablet by mouth Daily.      estradiol (ESTRACE) 0.5 MG tablet Take 1 tablet by mouth Daily. 90 tablet 3    FLUoxetine (PROzac) 40 MG capsule Take 1 capsule by mouth Daily. 90 capsule 0    loratadine (CLARITIN) 10 MG tablet       losartan-hydrochlorothiazide (HYZAAR) 100-12.5 MG per tablet       metoprolol succinate XL (TOPROL-XL) 25 MG 24 hr tablet Take 1 tablet by mouth Daily. 90 tablet 3    ondansetron (ZOFRAN) 8 MG tablet       rosuvastatin (CRESTOR) 10 MG tablet Take 1 tablet by mouth Every Night.  0     No current facility-administered medications for this visit.       Review of Symptoms:    Review of Systems   Constitutional:  Positive for fatigue. Negative for activity change (improved).   HENT:  Negative for congestion, rhinorrhea and sore throat.    Respiratory:  Negative for cough.    Cardiovascular: Negative.    Gastrointestinal: Negative.    Neurological:  Negative for headache.    Psychiatric/Behavioral:  Negative for agitation, behavioral problems, dysphoric mood, sleep disturbance and stress. The patient is not nervous/anxious.          Physical Exam:   not currently breastfeeding.  Video visit    Appearance:  female stated age in no acute distress  Gait, Station, Strength: Video visit    Mental Status Exam:     Mental Status exam performed 12/03/2024  and patient shows no significant changes from previous exam.     Hygiene:   good  Cooperation:  Cooperative  Eye Contact:  Good  Psychomotor Behavior:  Appropriate  Affect:  Full range  Mood: normal  Hopelessness: Denies  Speech:  Normal  Thought Process:  Goal directed and Linear  Thought Content:  Normal and Mood congruent  Suicidal:  None  Homicidal:  None  Hallucinations:  None  Delusion:  None  Memory:  Intact  Orientation:  Person, Place, Time and Situation  Reliability:  good  Insight:  Good  Judgement:  Good  Impulse Control:  Good  Physical/Medical Issues:  No        Lab Results:   No visits with results within 1 Month(s) from this visit.   Latest known visit with results is:   Hospital Outpatient Visit on 08/09/2024   Component Date Value Ref Range Status    Prox CCA PSV 08/09/2024 87.8  cm/sec Final    Prox CCA EDV 08/09/2024 25.1  cm/sec Final    Right Mid CCA PSV 08/09/2024 84.7  cm/sec Final    right Mid CCA EDV 08/09/2024 22.7  cm/sec Final    Dist CCA PSV 08/09/2024 64.4  cm/sec Final    Dist CCA EDV 08/09/2024 18.2  cm/sec Final    Prox ICA PSV 08/09/2024 63.4  cm/sec Final    Prox ICA EDV 08/09/2024 16.2  cm/sec Final    Mid ICA PSV 08/09/2024 80.7  cm/sec Final    Mid ICA EDV 08/09/2024 26.9  cm/sec Final    Dist ICA PSV 08/09/2024 102.0  cm/sec Final    Dist ICA EDV 08/09/2024 23.1  cm/sec Final    Prox ECA PSV 08/09/2024 99.4  cm/sec Final    Prox ECA EDV 08/09/2024 8.1  cm/sec Final    Vertebral A PSV 08/09/2024 34.2  cm/sec Final    Vertebral A EDV 08/09/2024 7.2  cm/sec Final    Prox SCLA PSV 08/09/2024  103.5  cm/sec Final    Prox CCA PSV 08/09/2024 93.8  cm/sec Final    Prox CCA EDV 08/09/2024 17.6  cm/sec Final    left Mid CCA PSV 08/09/2024 74.1  cm/sec Final    left Mid CCA EDV 08/09/2024 12.1  cm/sec Final    Dist CCA PSV 08/09/2024 60.4  cm/sec Final    Dist CCA EDV 08/09/2024 17.2  cm/sec Final    Prox ICA PSV 08/09/2024 43.9  cm/sec Final    Prox ICA EDV 08/09/2024 11.3  cm/sec Final    Mid ICA PSV 08/09/2024 80.1  cm/sec Final    Mid ICA EDV 08/09/2024 23.0  cm/sec Final    Dist ICA PSV 08/09/2024 137.0  cm/sec Final    Dist ICA EDV 08/09/2024 34.0  cm/sec Final    Prox ECA PSV 08/09/2024 65.9  cm/sec Final    Prox ECA EDV 08/09/2024 5.5  cm/sec Final    Vertebral A PSV 08/09/2024 38.9  cm/sec Final    Vertebral A EDV 08/09/2024 9.8  cm/sec Final    Prox SCLA PSV 08/09/2024 124.9  cm/sec Final    Right arm BP 08/09/2024 122/78  mmHg Final    ICA/CCA ratio 08/09/2024 0.95   Final    ICA/CCA ratio 08/09/2024 1.08   Final       Assessment & Plan   Diagnoses and all orders for this visit:    1. Generalized anxiety disorder (Primary)  -     FLUoxetine (PROzac) 40 MG capsule; Take 1 capsule by mouth Daily.  Dispense: 90 capsule; Refill: 0  -     buPROPion XL (WELLBUTRIN XL) 150 MG 24 hr tablet; Take 1 tablet by mouth Daily.  Dispense: 30 tablet; Refill: 11    2. Recurrent major depressive disorder, in partial remission  -     FLUoxetine (PROzac) 40 MG capsule; Take 1 capsule by mouth Daily.  Dispense: 90 capsule; Refill: 0  -     buPROPion XL (WELLBUTRIN XL) 150 MG 24 hr tablet; Take 1 tablet by mouth Daily.  Dispense: 30 tablet; Refill: 11    3. Class 1 obesity due to excess calories without serious comorbidity with body mass index (BMI) of 30.0 to 30.9 in adult  -     buPROPion XL (WELLBUTRIN XL) 150 MG 24 hr tablet; Take 1 tablet by mouth Daily.  Dispense: 30 tablet; Refill: 11      -Patient overall doing fairly well.  We had reduced Wellbutrin in between visits due to some side effects and she is doing  better on the smaller dosing.  -Reviewed previous available documentation  -Reviewed most recent available labs   -Continue Prozac 40 mg p.o. daily for mood and anxiety  -Continue Wellbutrin  mg p.o. daily for mood and anxiety as well as weight  -Obesity complicates all aspects of care  -Encouraged her continued healthy eating practices and physical activity as she has had some successful weight loss  -Encouraged to consider therapy.  -Approximate appointment time 9:45 AM to 10 AM via video visit      Visit Diagnoses:    ICD-10-CM ICD-9-CM   1. Generalized anxiety disorder  F41.1 300.02   2. Recurrent major depressive disorder, in partial remission  F33.41 296.35   3. Class 1 obesity due to excess calories without serious comorbidity with body mass index (BMI) of 30.0 to 30.9 in adult  E66.811 278.00    E66.09 V85.30    Z68.30            TREATMENT PLAN/GOALS: Continue supportive psychotherapy efforts and medications as indicated. Treatment and medication options discussed during today's visit. Patient acknowledged and verbally consented to continue with current treatment plan and was educated on the importance of compliance with treatment and follow-up appointments.    MEDICATION ISSUES:    Discussed medication options and treatment plan of prescribed medication as well as the risks, benefits, and side effects including potential falls, possible impaired driving and metabolic adversities among others. Patient is agreeable to call the office with any worsening of symptoms or onset of side effects. Patient is agreeable to call 911 or go to the nearest ER should he/she begin having SI/HI.     MEDS ORDERED DURING VISIT:  New Medications Ordered This Visit   Medications    FLUoxetine (PROzac) 40 MG capsule     Sig: Take 1 capsule by mouth Daily.     Dispense:  90 capsule     Refill:  0    buPROPion XL (WELLBUTRIN XL) 150 MG 24 hr tablet     Sig: Take 1 tablet by mouth Daily.     Dispense:  30 tablet     Refill:  11        Return in about 3 months (around 3/3/2025).             This document has been electronically signed by Sim Knight MD  December 3, 2024 09:25 EST

## 2025-01-16 ENCOUNTER — OFFICE VISIT (OUTPATIENT)
Dept: ORTHOPEDIC SURGERY | Facility: CLINIC | Age: 70
End: 2025-01-16
Payer: MEDICARE

## 2025-01-16 DIAGNOSIS — M75.121 NONTRAUMATIC COMPLETE TEAR OF RIGHT ROTATOR CUFF: Primary | ICD-10-CM

## 2025-01-16 RX ORDER — TRIAMCINOLONE ACETONIDE 40 MG/ML
40 INJECTION, SUSPENSION INTRA-ARTICULAR; INTRAMUSCULAR
Status: COMPLETED | OUTPATIENT
Start: 2025-01-16 | End: 2025-01-16

## 2025-01-16 RX ORDER — LIDOCAINE HYDROCHLORIDE 10 MG/ML
5 INJECTION, SOLUTION EPIDURAL; INFILTRATION; INTRACAUDAL; PERINEURAL
Status: COMPLETED | OUTPATIENT
Start: 2025-01-16 | End: 2025-01-16

## 2025-01-16 RX ADMIN — LIDOCAINE HYDROCHLORIDE 5 ML: 10 INJECTION, SOLUTION EPIDURAL; INFILTRATION; INTRACAUDAL; PERINEURAL at 09:52

## 2025-01-16 RX ADMIN — TRIAMCINOLONE ACETONIDE 40 MG: 40 INJECTION, SUSPENSION INTRA-ARTICULAR; INTRAMUSCULAR at 09:52

## 2025-01-16 NOTE — PROGRESS NOTES
Procedure   - Large Joint Arthrocentesis: R subacromial bursa on 1/16/2025 9:52 AM  Indications: pain  Details: 21 G needle, posterior approach  Medications: 5 mL lidocaine PF 1% 1 %; 40 mg triamcinolone acetonide 40 MG/ML  Outcome: tolerated well, no immediate complications  Procedure, treatment alternatives, risks and benefits explained, specific risks discussed. Consent was given by the patient. Immediately prior to procedure a time out was called to verify the correct patient, procedure, equipment, support staff and site/side marked as required. Patient was prepped and draped in the usual sterile fashion.      The shoulder injections have worked very effectively for her.  She desires to avoid operative intervention.  Repeat right subacromial injection    RIGHT SHOULDER SUBACROMIAL SPACE INJECTION: Risks and benefits of a shoulder subacromial space injection were discussed and the patient desired to proceed. Verbal consent was obtained. The patient understood the risk of infection, potential skin changes, bump in blood glucose especially with diabetes, nerve injury, possibility of increased pain in the short term, and possible incomplete pain relief.  Using sterile technique, the shoulder subacromial space was injected from a posterior approach with 1mL of 40 mg triamcinolone acetonide 40 MG/ML and 5cc of lidocaine with aspiration prior to injection. The patient tolerated the procedure without difficulty.  CPT CODE 32556 for major joint aspiration/injection

## 2025-02-21 ENCOUNTER — HOSPITAL ENCOUNTER (OUTPATIENT)
Dept: MAMMOGRAPHY | Facility: HOSPITAL | Age: 70
Discharge: HOME | End: 2025-02-21
Admitting: OBSTETRICS & GYNECOLOGY
Payer: MEDICARE

## 2025-02-21 DIAGNOSIS — Z12.31 SCREENING MAMMOGRAM FOR BREAST CANCER: ICD-10-CM

## 2025-02-21 PROCEDURE — 77067 SCR MAMMO BI INCL CAD: CPT

## 2025-02-21 PROCEDURE — 77063 BREAST TOMOSYNTHESIS BI: CPT

## 2025-02-25 ENCOUNTER — TELEMEDICINE (OUTPATIENT)
Dept: PSYCHIATRY | Facility: CLINIC | Age: 70
End: 2025-02-25
Payer: MEDICARE

## 2025-02-25 DIAGNOSIS — F41.1 GENERALIZED ANXIETY DISORDER: ICD-10-CM

## 2025-02-25 DIAGNOSIS — F33.41 RECURRENT MAJOR DEPRESSIVE DISORDER, IN PARTIAL REMISSION: ICD-10-CM

## 2025-02-25 PROCEDURE — 99213 OFFICE O/P EST LOW 20 MIN: CPT | Performed by: PSYCHIATRY & NEUROLOGY

## 2025-02-25 PROCEDURE — 1160F RVW MEDS BY RX/DR IN RCRD: CPT | Performed by: PSYCHIATRY & NEUROLOGY

## 2025-02-25 PROCEDURE — 1159F MED LIST DOCD IN RCRD: CPT | Performed by: PSYCHIATRY & NEUROLOGY

## 2025-02-25 RX ORDER — FLUOXETINE HYDROCHLORIDE 40 MG/1
40 CAPSULE ORAL DAILY
Qty: 90 CAPSULE | Refills: 0 | Status: SHIPPED | OUTPATIENT
Start: 2025-02-25

## 2025-02-25 NOTE — PROGRESS NOTES
Subjective   Germaine Jett is a 69 y.o. female who presents today for follow up    Chief Complaint:  Depression and Anxiety    This provider is located at [facility], [address] [place] [address]. The Patient is seen remotely at home, using Epic Mychart. Patient is being seen via telehealth and stated they are in a secure environment for this session. The patient’s condition being diagnosed/treated is appropriate for telemedicine. The provider identified himself as well as his credentials.   The patient gave consent to be seen remotely, and when consent is given they understand that the consent allows for patient identifiable information to be sent to a third party as needed.   They may refuse to be seen remotely at any time. The electronic data is encrypted and password protected, and the patient has been advised of the potential risks to privacy not withstanding such measures      History of Present Illness:     History of Present Illness  The patient is a 69-year-old female presenting for a follow-up of depression and anxiety.    She reports an improvement in her mood, attributing this positive change to the consistent use of her prescribed medication.    Supplemental Information  Recovering from Flu A    MEDICATIONS  Wellbutrin, Prozac          The following portions of the patient's history were reviewed and updated as appropriate: allergies, current medications, past family history, past medical history, past social history, past surgical history and problem list.      Past Medical History:  Past Medical History:   Diagnosis Date    Abnormal ECG     Acute pain of left knee     Depression     Hx of    Dyspnea     Edema     Frozen shoulder 2021    Hypercholesterolemia     Hyperlipidemia     Hypertension     Hypothyroid      10/14 TSH 5.320;  04/15 TSH 4.580    Insomnia     Knee osteoarthritis     RT. meniscal scope, Dr. Alvino Cifuentes, 01/15. removal of ganglion cyst 01/15    Obesity (BMI 30.0-34.9)      BMI 33.66    Palpitations     Periarthritis of shoulder     Rotator cuff syndrome     Urinary incontinence     Wears glasses     READING       Social History:  Social History     Socioeconomic History    Marital status:     Number of children: 1    Highest education level: Master's degree (e.g., MA, MS, Misty, MEd, MSW, SIMON)   Tobacco Use    Smoking status: Never     Passive exposure: Never    Smokeless tobacco: Never   Vaping Use    Vaping status: Never Used   Substance and Sexual Activity    Alcohol use: Never    Drug use: Never    Sexual activity: Yes     Partners: Male     Birth control/protection: Hysterectomy       Family History:  Family History   Problem Relation Age of Onset    Heart attack Mother         Acute MI    Hypertension Mother     Stroke Mother     Depression Mother     Heart disease Mother     Osteoarthritis Mother     Osteoporosis Mother     Heart attack Father 63            Heart disease Father     Hypertension Father     Anxiety disorder Daughter     Depression Daughter     Depression Other     Heart disease Other     Hypertension Other     Breast cancer Neg Hx     Ovarian cancer Neg Hx        Past Surgical History:  Past Surgical History:   Procedure Laterality Date    APPENDECTOMY      BLADDER SURGERY      BREAST BIOPSY Left     CARDIAC CATHETERIZATION       SECTION      COLONOSCOPY      GANGLION CYST EXCISION  2015    R KNEE    HYSTERECTOMY      INCISIONAL BREAST BIOPSY      JOINT REPLACEMENT      KNEE ARTHROSCOPY Right     OOPHORECTOMY      TONSILLECTOMY AND ADENOIDECTOMY      TOTAL ABDOMINAL HYSTERECTOMY WITH SALPINGO OOPHORECTOMY      TOTAL KNEE ARTHROPLASTY Right 2019    Procedure: TOTAL KNEE ARTHROPLASTY RIGHT;  Surgeon: Medardo Cosme MD;  Location: [facility];  Service: Orthopedics       Problem List:  Patient Active Problem List   Diagnosis    Palpitations    Hypertension    Hyperlipidemia    Obesity (BMI 30.0-34.9)    Hypercholesterolemia     Depression    Knee osteoarthritis    Hypothyroid    Insomnia    Primary osteoarthritis of right knee    Status post total right knee replacement    Leukocytosis, mild, likely reactive    Acute blood loss anemia, mild, asymptomatic    Acute postoperative pain    Nontraumatic complete tear of right rotator cuff    Rotator cuff impingement syndrome, right    Impingement syndrome of right shoulder    Premature atrial complexes       Allergy:   Allergies   Allergen Reactions    Benadryl [Diphenhydramine] Other (See Comments)     SLEEPY    Hydrocodone Itching    Percocet [Oxycodone-Acetaminophen] Other (See Comments)     SEVERE ITCHING IN HEAD        Current Medications:   Current Outpatient Medications   Medication Sig Dispense Refill    aspirin 81 MG EC tablet Take 1 tablet by mouth Daily. Resume in 1 month      buPROPion XL (WELLBUTRIN XL) 150 MG 24 hr tablet Take 1 tablet by mouth Daily. 30 tablet 11    estradiol (ESTRACE) 0.5 MG tablet Take 1 tablet by mouth Daily. 90 tablet 3    FLUoxetine (PROzac) 40 MG capsule Take 1 capsule by mouth Daily. 90 capsule 0    losartan-hydrochlorothiazide (HYZAAR) 100-12.5 MG per tablet       metoprolol succinate XL (TOPROL-XL) 25 MG 24 hr tablet Take 1 tablet by mouth Daily. 90 tablet 3    rosuvastatin (CRESTOR) 10 MG tablet Take 1 tablet by mouth Every Night.  0     No current facility-administered medications for this visit.       Review of Symptoms:    Review of Systems   Constitutional:  Positive for fatigue. Negative for activity change (improved).   HENT:  Negative for congestion, rhinorrhea and sore throat.    Respiratory:  Negative for cough.    Cardiovascular: Negative.    Gastrointestinal: Negative.    Neurological:  Negative for headache.   Psychiatric/Behavioral:  Negative for agitation, behavioral problems, dysphoric mood, sleep disturbance and stress. The patient is not nervous/anxious.          Physical Exam:   not currently breastfeeding.  Video  visit    Appearance:  female stated age in no acute distress  Gait, Station, Strength: Video visit    Mental Status Exam:     Mental Status exam performed 02/25/2025  and patient shows no significant changes from previous exam.     Hygiene:   good  Cooperation:  Cooperative  Eye Contact:  Good  Psychomotor Behavior:  Appropriate  Affect:  Full range  Mood: normal  Hopelessness: Denies  Speech:  Normal  Thought Process:  Goal directed and Linear  Thought Content:  Normal and Mood congruent  Suicidal:  None  Homicidal:  None  Hallucinations:  None  Delusion:  None  Memory:  Intact  Orientation:  Person, Place, Time and Situation  Reliability:  good  Insight:  Good  Judgement:  Good  Impulse Control:  Good  Physical/Medical Issues:  No        Lab Results:   No visits with results within 1 Month(s) from this visit.   Latest known visit with results is:   Hospital Outpatient Visit on 08/09/2024   Component Date Value Ref Range Status    Prox CCA PSV 08/09/2024 87.8  cm/sec Final    Prox CCA EDV 08/09/2024 25.1  cm/sec Final    Right Mid CCA PSV 08/09/2024 84.7  cm/sec Final    right Mid CCA EDV 08/09/2024 22.7  cm/sec Final    Dist CCA PSV 08/09/2024 64.4  cm/sec Final    Dist CCA EDV 08/09/2024 18.2  cm/sec Final    Prox ICA PSV 08/09/2024 63.4  cm/sec Final    Prox ICA EDV 08/09/2024 16.2  cm/sec Final    Mid ICA PSV 08/09/2024 80.7  cm/sec Final    Mid ICA EDV 08/09/2024 26.9  cm/sec Final    Dist ICA PSV 08/09/2024 102.0  cm/sec Final    Dist ICA EDV 08/09/2024 23.1  cm/sec Final    Prox ECA PSV 08/09/2024 99.4  cm/sec Final    Prox ECA EDV 08/09/2024 8.1  cm/sec Final    Vertebral A PSV 08/09/2024 34.2  cm/sec Final    Vertebral A EDV 08/09/2024 7.2  cm/sec Final    Prox SCLA PSV 08/09/2024 103.5  cm/sec Final    Prox CCA PSV 08/09/2024 93.8  cm/sec Final    Prox CCA EDV 08/09/2024 17.6  cm/sec Final    left Mid CCA PSV 08/09/2024 74.1  cm/sec Final    left Mid CCA EDV 08/09/2024 12.1  cm/sec Final    Dist  CCA PSV 08/09/2024 60.4  cm/sec Final    Dist CCA EDV 08/09/2024 17.2  cm/sec Final    Prox ICA PSV 08/09/2024 43.9  cm/sec Final    Prox ICA EDV 08/09/2024 11.3  cm/sec Final    Mid ICA PSV 08/09/2024 80.1  cm/sec Final    Mid ICA EDV 08/09/2024 23.0  cm/sec Final    Dist ICA PSV 08/09/2024 137.0  cm/sec Final    Dist ICA EDV 08/09/2024 34.0  cm/sec Final    Prox ECA PSV 08/09/2024 65.9  cm/sec Final    Prox ECA EDV 08/09/2024 5.5  cm/sec Final    Vertebral A PSV 08/09/2024 38.9  cm/sec Final    Vertebral A EDV 08/09/2024 9.8  cm/sec Final    Prox SCLA PSV 08/09/2024 124.9  cm/sec Final    Right arm BP 08/09/2024 122/78  mmHg Final    ICA/CCA ratio 08/09/2024 0.95   Final    ICA/CCA ratio 08/09/2024 1.08   Final       Assessment & Plan   Diagnoses and all orders for this visit:    1. Recurrent major depressive disorder, in partial remission  -     FLUoxetine (PROzac) 40 MG capsule; Take 1 capsule by mouth Daily.  Dispense: 90 capsule; Refill: 0    2. Generalized anxiety disorder  -     FLUoxetine (PROzac) 40 MG capsule; Take 1 capsule by mouth Daily.  Dispense: 90 capsule; Refill: 0        -Patient continues to do very well.  No major issues noted today.  She is more consistently taking her medication which has helped overall mood.  -Reviewed previous available documentation  -Reviewed most recent available labs   -Continue Prozac 40 mg p.o. daily for mood and anxiety  -Continue Wellbutrin  mg p.o. daily for mood and anxiety as well as weight  -Obesity complicates all aspects of care  -Encouraged to consider therapy.  -Approximate appointment time 10:15 AM to 10:30 AM via video visit      Visit Diagnoses:    ICD-10-CM ICD-9-CM   1. Recurrent major depressive disorder, in partial remission  F33.41 296.35   2. Generalized anxiety disorder  F41.1 300.02             TREATMENT PLAN/GOALS: Continue supportive psychotherapy efforts and medications as indicated. Treatment and medication options discussed during  today's visit. Patient acknowledged and verbally consented to continue with current treatment plan and was educated on the importance of compliance with treatment and follow-up appointments.    MEDICATION ISSUES:    Discussed medication options and treatment plan of prescribed medication as well as the risks, benefits, and side effects including potential falls, possible impaired driving and metabolic adversities among others. Patient is agreeable to call the office with any worsening of symptoms or onset of side effects. Patient is agreeable to call 911 or go to the nearest ER should he/she begin having SI/HI.     MEDS ORDERED DURING VISIT:  New Medications Ordered This Visit   Medications    FLUoxetine (PROzac) 40 MG capsule     Sig: Take 1 capsule by mouth Daily.     Dispense:  90 capsule     Refill:  0       Return in about 4 months (around 6/25/2025).       Patient or patient representative verbalized consent for the use of Ambient Listening during the visit with  Sim Knight MD for chart documentation. 2/25/2025  10:36 EST        This document has been electronically signed by Sim Knight MD  February 25, 2025 09:45 EST

## 2025-04-09 ENCOUNTER — OFFICE VISIT (OUTPATIENT)
Dept: OBSTETRICS AND GYNECOLOGY | Facility: CLINIC | Age: 70
End: 2025-04-09
Payer: MEDICARE

## 2025-04-09 VITALS
WEIGHT: 188.6 LBS | SYSTOLIC BLOOD PRESSURE: 114 MMHG | DIASTOLIC BLOOD PRESSURE: 80 MMHG | HEIGHT: 65 IN | BODY MASS INDEX: 31.42 KG/M2

## 2025-04-09 DIAGNOSIS — Z13.820 SCREENING FOR OSTEOPOROSIS: ICD-10-CM

## 2025-04-09 DIAGNOSIS — Z01.419 WOMEN'S ANNUAL ROUTINE GYNECOLOGICAL EXAMINATION: Primary | ICD-10-CM

## 2025-04-09 DIAGNOSIS — Z79.890 HORMONE REPLACEMENT THERAPY (POSTMENOPAUSAL): ICD-10-CM

## 2025-04-09 DIAGNOSIS — M85.88 OTHER SPECIFIED DISORDERS OF BONE DENSITY AND STRUCTURE, OTHER SITE: ICD-10-CM

## 2025-04-09 RX ORDER — ONDANSETRON 4 MG/1
TABLET, ORALLY DISINTEGRATING ORAL
COMMUNITY
Start: 2025-04-02

## 2025-04-09 RX ORDER — ESTRADIOL 0.5 MG/1
0.5 TABLET ORAL DAILY
Qty: 90 TABLET | Refills: 3 | Status: SHIPPED | OUTPATIENT
Start: 2025-04-09

## 2025-04-09 NOTE — PROGRESS NOTES
Subjective   Chief Complaint   Patient presents with    Gynecologic Exam     No pap, Last pap done 22-WNL, MMG done 25-WNL, No complaints       Germaine Jett is a 69 y.o. year old  presenting to be seen for her annual gynecological exam.   No complaints or concerns  Desires to continue estradiol 0.5 mg  Several years since last DEXA    Past Medical History:   Diagnosis Date    Abnormal ECG     Acute pain of left knee     Anxiety     Depression     Hx of    Dyspnea     Edema     Frozen shoulder     Hypercholesterolemia     Hyperlipidemia     Hypertension     Hypothyroid      10/14 TSH 5.320;  04/15 TSH 4.580    Insomnia     Knee osteoarthritis     RT. meniscal scope, Dr. Alvino Cifuentes, 01/15. removal of ganglion cyst 01/15    Obesity (BMI 30.0-34.9)     BMI 33.66    Palpitations     Periarthritis of shoulder     Rotator cuff syndrome     Urinary incontinence     Wears glasses     READING        Current Outpatient Medications:     aspirin 81 MG EC tablet, Take 1 tablet by mouth Daily. Resume in 1 month, Disp: , Rfl:     buPROPion XL (WELLBUTRIN XL) 150 MG 24 hr tablet, Take 1 tablet by mouth Daily., Disp: 30 tablet, Rfl: 11    estradiol (ESTRACE) 0.5 MG tablet, Take 1 tablet by mouth Daily., Disp: 90 tablet, Rfl: 3    FLUoxetine (PROzac) 40 MG capsule, Take 1 capsule by mouth Daily., Disp: 90 capsule, Rfl: 0    losartan-hydrochlorothiazide (HYZAAR) 100-12.5 MG per tablet, , Disp: , Rfl:     metoprolol succinate XL (TOPROL-XL) 25 MG 24 hr tablet, Take 1 tablet by mouth Daily., Disp: 90 tablet, Rfl: 3    ondansetron ODT (ZOFRAN-ODT) 4 MG disintegrating tablet, , Disp: , Rfl:     rosuvastatin (CRESTOR) 10 MG tablet, Take 1 tablet by mouth Every Night., Disp: , Rfl: 0   Allergies   Allergen Reactions    Benadryl [Diphenhydramine] Other (See Comments)     SLEEPY    Hydrocodone Itching    Percocet [Oxycodone-Acetaminophen] Other (See Comments)     SEVERE ITCHING IN HEAD      Past Surgical  "History:   Procedure Laterality Date    APPENDECTOMY      BLADDER SURGERY      BREAST BIOPSY Left     CARDIAC CATHETERIZATION       SECTION      COLONOSCOPY  2017    GANGLION CYST EXCISION  2015    R KNEE    HYSTERECTOMY      INCISIONAL BREAST BIOPSY      JOINT REPLACEMENT      KNEE ARTHROSCOPY Right     OOPHORECTOMY      SKIN BIOPSY      TONSILLECTOMY AND ADENOIDECTOMY      TOTAL ABDOMINAL HYSTERECTOMY WITH SALPINGO OOPHORECTOMY      TOTAL KNEE ARTHROPLASTY Right 2019    Procedure: TOTAL KNEE ARTHROPLASTY RIGHT;  Surgeon: Medardo Cosme MD;  Location: [facility];  Service: Orthopedics      Social History     Socioeconomic History    Marital status:     Number of children: 1    Highest education level: Master's degree (e.g., MA, MS, Misty, MEd, MSW, SIMON)   Tobacco Use    Smoking status: Never     Passive exposure: Never    Smokeless tobacco: Never   Vaping Use    Vaping status: Never Used   Substance and Sexual Activity    Alcohol use: Never    Drug use: Never    Sexual activity: Yes     Partners: Male     Birth control/protection: Hysterectomy      Family History   Problem Relation Age of Onset    Heart attack Mother         Acute MI    Hypertension Mother     Stroke Mother     Depression Mother     Heart disease Mother     Osteoarthritis Mother     Osteoporosis Mother     Dementia Mother     Heart attack Father 63            Heart disease Father     Hypertension Father     Anxiety disorder Daughter     Depression Daughter     Depression Other     Heart disease Other     Hypertension Other     Breast cancer Neg Hx     Ovarian cancer Neg Hx        Review of Systems   Constitutional: Negative.    Gastrointestinal: Negative.  Positive for constipation.   Genitourinary: Negative.            Objective   /80   Ht 165.1 cm (65\")   Wt 85.5 kg (188 lb 9.6 oz)   BMI 31.38 kg/m²     Physical Exam  Exam conducted with a chaperone present.   Constitutional:       Appearance: Normal " appearance. She is well-developed and well-groomed.   Eyes:      General: Lids are normal.      Extraocular Movements: Extraocular movements intact.      Conjunctiva/sclera: Conjunctivae normal.   Chest:   Breasts:     Breasts are symmetrical.      Right: No inverted nipple, mass, nipple discharge, skin change or tenderness.      Left: No inverted nipple, mass, nipple discharge, skin change or tenderness.   Abdominal:      General: There is no distension.      Palpations: Abdomen is soft.      Tenderness: There is no abdominal tenderness.   Genitourinary:     Exam position: Lithotomy position.      Labia:         Right: No rash, tenderness or lesion.         Left: No rash, tenderness or lesion.       Urethra: No prolapse, urethral pain, urethral swelling or urethral lesion.      Vagina: No vaginal discharge, tenderness or lesions.      Adnexa:         Right: No mass or tenderness.          Left: No mass or tenderness.     Musculoskeletal:      Cervical back: Neck supple.   Lymphadenopathy:      Upper Body:      Right upper body: No axillary adenopathy.      Left upper body: No axillary adenopathy.   Skin:     General: Skin is warm and dry.      Findings: No lesion.   Neurological:      General: No focal deficit present.      Mental Status: She is alert and oriented to person, place, and time.   Psychiatric:         Attention and Perception: Attention normal.         Mood and Affect: Mood normal.         Speech: Speech normal.         Behavior: Behavior normal.         Thought Content: Thought content normal.            Result Review :                   Assessment and Plan  Diagnoses and all orders for this visit:    1. Women's annual routine gynecological examination (Primary)    2. Hormone replacement therapy (postmenopausal)    3. Screening for osteoporosis  -     DEXA Bone Density Axial; Future    4. Other specified disorders of bone density and structure, other site  -     DEXA Bone Density Axial;  Future    Other orders  -     estradiol (ESTRACE) 0.5 MG tablet; Take 1 tablet by mouth Daily.  Dispense: 90 tablet; Refill: 3      Patient Instructions   Self breast exam monthly  Annual mammogram  vit D 2000 mg daily  Regular excercise            This note was electronically signed.    Edwige Frausto PA-C   April 9, 2025

## 2025-06-05 ENCOUNTER — OFFICE VISIT (OUTPATIENT)
Dept: ORTHOPEDIC SURGERY | Facility: CLINIC | Age: 70
End: 2025-06-05
Payer: MEDICARE

## 2025-06-05 DIAGNOSIS — M75.121 NONTRAUMATIC COMPLETE TEAR OF RIGHT ROTATOR CUFF: Primary | ICD-10-CM

## 2025-06-05 RX ORDER — TRIAMCINOLONE ACETONIDE 40 MG/ML
40 INJECTION, SUSPENSION INTRA-ARTICULAR; INTRAMUSCULAR
Status: COMPLETED | OUTPATIENT
Start: 2025-06-05 | End: 2025-06-05

## 2025-06-05 RX ORDER — LIDOCAINE HYDROCHLORIDE 10 MG/ML
5 INJECTION, SOLUTION EPIDURAL; INFILTRATION; INTRACAUDAL; PERINEURAL
Status: COMPLETED | OUTPATIENT
Start: 2025-06-05 | End: 2025-06-05

## 2025-06-05 RX ADMIN — TRIAMCINOLONE ACETONIDE 40 MG: 40 INJECTION, SUSPENSION INTRA-ARTICULAR; INTRAMUSCULAR at 11:21

## 2025-06-05 RX ADMIN — LIDOCAINE HYDROCHLORIDE 5 ML: 10 INJECTION, SOLUTION EPIDURAL; INFILTRATION; INTRACAUDAL; PERINEURAL at 11:21

## 2025-06-05 NOTE — PROGRESS NOTES
Procedure   Large Joint Arthrocentesis: R subacromial bursa  Date/Time: 6/5/2025 11:21 AM  Consent given by: patient  Site marked: site marked  Timeout: Immediately prior to procedure a time out was called to verify the correct patient, procedure, equipment, support staff and site/side marked as required   Supporting Documentation  Indications: pain   Procedure Details  Location: shoulder - R subacromial bursa  Preparation: Patient was prepped and draped in the usual sterile fashion  Needle gauge: 21g.  Approach: posterior  Medications administered: 5 mL lidocaine PF 1% 1 %; 40 mg triamcinolone acetonide 40 MG/ML  Patient tolerance: patient tolerated the procedure well with no immediate complications       Right shoulder injections have continued to be effective and she desires to avoid any operative intervention which is reasonable.    Susi is in [place] with her family, celebrating her birthday.  Great trip so far    RIGHT SHOULDER SUBACROMIAL SPACE INJECTION: Risks and benefits of a shoulder subacromial space injection were discussed and the patient desired to proceed. Verbal consent was obtained. The patient understood the risk of infection, potential skin changes, bump in blood glucose especially with diabetes, nerve injury, possibility of increased pain in the short term, and possible incomplete pain relief.  Using sterile technique, the shoulder subacromial space was injected from a posterior approach with 1mL of 40 mg triamcinolone acetonide 40 MG/ML and 5cc of lidocaine with aspiration prior to injection. The patient tolerated the procedure without difficulty.  CPT CODE 65093 for major joint aspiration/injection

## 2025-08-13 ENCOUNTER — TELEMEDICINE (OUTPATIENT)
Dept: PSYCHIATRY | Facility: CLINIC | Age: 70
End: 2025-08-13
Payer: MEDICARE

## 2025-08-13 DIAGNOSIS — F90.2 ATTENTION DEFICIT HYPERACTIVITY DISORDER (ADHD), COMBINED TYPE: Primary | ICD-10-CM

## 2025-08-13 DIAGNOSIS — F41.1 GENERALIZED ANXIETY DISORDER: ICD-10-CM

## 2025-08-13 DIAGNOSIS — F33.41 RECURRENT MAJOR DEPRESSIVE DISORDER, IN PARTIAL REMISSION: ICD-10-CM

## 2025-08-13 PROCEDURE — 1159F MED LIST DOCD IN RCRD: CPT | Performed by: PSYCHIATRY & NEUROLOGY

## 2025-08-13 PROCEDURE — 99214 OFFICE O/P EST MOD 30 MIN: CPT | Performed by: PSYCHIATRY & NEUROLOGY

## 2025-08-13 PROCEDURE — 1160F RVW MEDS BY RX/DR IN RCRD: CPT | Performed by: PSYCHIATRY & NEUROLOGY

## 2025-08-13 RX ORDER — DEXTROAMPHETAMINE SACCHARATE, AMPHETAMINE ASPARTATE MONOHYDRATE, DEXTROAMPHETAMINE SULFATE AND AMPHETAMINE SULFATE 3.75; 3.75; 3.75; 3.75 MG/1; MG/1; MG/1; MG/1
15 CAPSULE, EXTENDED RELEASE ORAL EVERY MORNING
Qty: 30 CAPSULE | Refills: 0 | Status: SHIPPED | OUTPATIENT
Start: 2025-08-13

## (undated) DEVICE — CVR HNDL LT SURG ACCSSRY BLU STRL

## (undated) DEVICE — STRYKER PERFORMANCE SERIES SAGITTAL BLADE: Brand: STRYKER PERFORMANCE SERIES

## (undated) DEVICE — T4 PULLOVER TOGA, X-LARGE

## (undated) DEVICE — ANTIBACTERIAL UNDYED BRAIDED (POLYGLACTIN 910), SYNTHETIC ABSORBABLE SUTURE: Brand: COATED VICRYL

## (undated) DEVICE — BNDG ELAS W/CLIP 6IN 10YD LF STRL

## (undated) DEVICE — PUMP PAIN AUTOFUSER AUTO SELCT NOBOLUS 1TO14ML/HR 550ML DISP

## (undated) DEVICE — UNDERCAST PADDING: Brand: DEROYAL

## (undated) DEVICE — SPNG GZ STRL 2S 4X4 12PLY

## (undated) DEVICE — SYS SKIN CLS DERMABOND PRINEO W/22CM MESH TP

## (undated) DEVICE — Device

## (undated) DEVICE — NDL HYPO ECLPS SFTY 18G 1 1/2IN

## (undated) DEVICE — CEMENT MIXING SYSTEM WITH MIS FEMORAL BREAKAWAY NOZZLE: Brand: REVOLUTION

## (undated) DEVICE — GLV SURG PREMIERPRO MIC LTX PF SZ8.5 BRN

## (undated) DEVICE — GLV SURG SENSICARE W/ALOE PF LF 9 STRL

## (undated) DEVICE — PAD,ABDOMINAL,8"X10",ST,LF: Brand: MEDLINE

## (undated) DEVICE — PENCL E/S HNDSWCH ROCKRBTN HOLSTR 10FT

## (undated) DEVICE — DRSNG PAD ABD 8X10IN STRL

## (undated) DEVICE — PK KN TOTL 10